# Patient Record
Sex: FEMALE | Race: WHITE | NOT HISPANIC OR LATINO | Employment: FULL TIME | URBAN - METROPOLITAN AREA
[De-identification: names, ages, dates, MRNs, and addresses within clinical notes are randomized per-mention and may not be internally consistent; named-entity substitution may affect disease eponyms.]

---

## 2020-02-24 ENCOUNTER — TRANSFERRED RECORDS (OUTPATIENT)
Dept: HEALTH INFORMATION MANAGEMENT | Facility: CLINIC | Age: 55
End: 2020-02-24

## 2020-11-12 ENCOUNTER — TRANSFERRED RECORDS (OUTPATIENT)
Dept: HEALTH INFORMATION MANAGEMENT | Facility: CLINIC | Age: 55
End: 2020-11-12

## 2020-12-02 ENCOUNTER — VIRTUAL VISIT (OUTPATIENT)
Dept: ENDOCRINOLOGY | Facility: CLINIC | Age: 55
End: 2020-12-02
Payer: COMMERCIAL

## 2020-12-02 VITALS — HEIGHT: 68 IN | BODY MASS INDEX: 44.41 KG/M2 | WEIGHT: 293 LBS

## 2020-12-02 DIAGNOSIS — Z71.3 NUTRITIONAL COUNSELING: ICD-10-CM

## 2020-12-02 DIAGNOSIS — E66.813 CLASS 3 SEVERE OBESITY DUE TO EXCESS CALORIES WITH SERIOUS COMORBIDITY AND BODY MASS INDEX (BMI) OF 50.0 TO 59.9 IN ADULT (H): Primary | ICD-10-CM

## 2020-12-02 DIAGNOSIS — E66.01 CLASS 3 SEVERE OBESITY DUE TO EXCESS CALORIES WITH SERIOUS COMORBIDITY AND BODY MASS INDEX (BMI) OF 50.0 TO 59.9 IN ADULT (H): Primary | ICD-10-CM

## 2020-12-02 DIAGNOSIS — K59.1 DIARRHEA, FUNCTIONAL: ICD-10-CM

## 2020-12-02 DIAGNOSIS — K75.81 NASH (NONALCOHOLIC STEATOHEPATITIS): ICD-10-CM

## 2020-12-02 DIAGNOSIS — E55.9 VITAMIN D DEFICIENCY: ICD-10-CM

## 2020-12-02 DIAGNOSIS — E11.8 DIABETES MELLITUS TYPE 2 WITH COMPLICATIONS (H): ICD-10-CM

## 2020-12-02 PROBLEM — R79.89 ELEVATED LIVER FUNCTION TESTS: Status: ACTIVE | Noted: 2018-05-06

## 2020-12-02 PROBLEM — R74.8 ELEVATED LIVER ENZYMES: Status: ACTIVE | Noted: 2018-05-01

## 2020-12-02 PROCEDURE — 97802 MEDICAL NUTRITION INDIV IN: CPT | Mod: GT | Performed by: DIETITIAN, REGISTERED

## 2020-12-02 PROCEDURE — 99203 OFFICE O/P NEW LOW 30 MIN: CPT | Mod: 95 | Performed by: NURSE PRACTITIONER

## 2020-12-02 RX ORDER — FLASH GLUCOSE SENSOR
KIT MISCELLANEOUS
COMMUNITY
Start: 2020-05-01 | End: 2021-03-17

## 2020-12-02 RX ORDER — ERGOCALCIFEROL 1.25 MG/1
50000 CAPSULE ORAL
COMMUNITY
Start: 2020-03-01 | End: 2021-07-14

## 2020-12-02 RX ORDER — ATORVASTATIN CALCIUM 10 MG/1
10 TABLET, FILM COATED ORAL AT BEDTIME
COMMUNITY
Start: 2020-06-23 | End: 2021-05-18

## 2020-12-02 RX ORDER — PHENOL 1.4 %
600 AEROSOL, SPRAY (ML) MUCOUS MEMBRANE 2 TIMES DAILY WITH MEALS
COMMUNITY
Start: 2020-05-01 | End: 2021-03-17

## 2020-12-02 RX ORDER — HYDROCHLOROTHIAZIDE 25 MG/1
TABLET ORAL PRN
COMMUNITY
Start: 2018-08-01 | End: 2021-05-18

## 2020-12-02 RX ORDER — INFLUENZA A VIRUS A/GUANGDONG-MAONAN/SWL1536/2019 CNIC-1909 (H1N1) ANTIGEN (FORMALDEHYDE INACTIVATED), INFLUENZA A VIRUS A/HONG KONG/2671/2019 (H3N2) ANTIGEN (FORMALDEHYDE INACTIVATED), INFLUENZA B VIRUS B/PHUKET/3073/2013 ANTIGEN (FORMALDEHYDE INACTIVATED), AND INFLUENZA B VIRUS B/WASHINGTON/02/2019 ANTIGEN (FORMALDEHYDE INACTIVATED) 15; 15; 15; 15 UG/.5ML; UG/.5ML; UG/.5ML; UG/.5ML
INJECTION, SUSPENSION INTRAMUSCULAR
COMMUNITY
Start: 2020-09-22 | End: 2021-03-16

## 2020-12-02 RX ORDER — FLASH GLUCOSE SCANNING READER
EACH MISCELLANEOUS
COMMUNITY
Start: 2020-06-12 | End: 2021-10-13

## 2020-12-02 RX ORDER — FLASH GLUCOSE SENSOR
1 KIT MISCELLANEOUS
COMMUNITY
Start: 2020-06-10 | End: 2021-10-13

## 2020-12-02 RX ORDER — FLASH GLUCOSE SENSOR
KIT MISCELLANEOUS
COMMUNITY
Start: 2020-10-23 | End: 2021-03-17

## 2020-12-02 RX ORDER — TOPIRAMATE 25 MG/1
50 TABLET, FILM COATED ORAL EVERY EVENING
Status: ON HOLD | COMMUNITY
Start: 2020-02-25 | End: 2021-04-06

## 2020-12-02 ASSESSMENT — MIFFLIN-ST. JEOR: SCORE: 2124.29

## 2020-12-02 ASSESSMENT — PAIN SCALES - GENERAL: PAINLEVEL: EXTREME PAIN (8)

## 2020-12-02 NOTE — PROGRESS NOTES
"New Bariatric Surgery Consultation Note    2020    RE: Stephanie Sanches  MR#: [id number]  : 1965      Referring provider: No flowsheet data found.    Chief Complaint/Reason for visit: evaluation for possible weight loss surgery    Dear Benson Allen MD (General),    I had the pleasure of seeing your patient, Stephanie Sanches, to evaluate her obesity and consider her for possible weight loss surgery. As you know, Stephanie Sanches is 55 year old.  She has a height of 5' 7.5\", a weight of 326 lbs 0 oz, and calculated Body mass index is 50.31 kg/m .      HISTORY OF PRESENT ILLNESS:  Weight Loss History Reviewed with Patient 2020   How long have you been overweight? Since early childhood   What is the most that you have ever weighed? 360   What is the most weight you have lost? 50   I have tried the following methods to lose weight Watching portions or calories, Exercise, Slimfast, Prescription Medications, Physician directed program   I have tried the following weight loss medications? (Check all that apply) Topamax/Topiramate, Fen-Phen   Have you ever had weight loss surgery? No     PCP and hepatologist has recommended surgery in the past but felt like she wanted to try everything else first.     Well [place] 2020 336, started on topiramate in this program- beneficial,     CO-MORBIDITIES OF OBESITY INCLUDE:     2020   I have the following health issues associated with obesity: Type II Diabetes, Fatty Liver     NAM- followed by GI    DMII- endocrinology , A1C stable at 7.1 was taking metformin and victoza which was helpful but then PCP recently stopped the victoza     Reflux- none currently, very rarely     PAST MEDICAL HISTORY:    No hx blood clots   Patient Active Problem List   Diagnosis     Morbid obesity (H)     Diabetes mellitus type 2 in obese (H)     Elevated liver enzymes     Diabetes mellitus type 2 with complications (H)     Diarrhea, functional     Elevated liver " function tests     Family history of ovarian cancer     Hyperlipidemia     Irritable bowel syndrome     NAM (nonalcoholic steatohepatitis)     Osteoarthrosis involving lower leg     Vitamin D deficiency       PAST SURGICAL HISTORY:  Past Surgical History:   Procedure Laterality Date     CHOLECYSTECTOMY  2008       FAMILY HISTORY:   No family history on file.      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 No   Who do you have in your support network that can be available to help you for the first 2 weeks after surgery? Friends, boyfriend and family members   Who can you count on for support throughout your weight loss surgery journey? Friends, boyfriend and family members   Can you afford 3 meals a day?  Yes   Can you afford 50-60 dollars a month for vitamins? Yes     Works from home     HABITS:     11/30/2020   How often do you drink alcohol? Never   Have you ever used any of the following nicotine products? No   Have you or are you currently using street drugs or prescription strength medication for which you do not have a prescription for? No   Do you have a history of chemical dependency (alcohol or drug abuse)? No       PSYCHOLOGICAL HISTORY:   Psychological History Reviewed With Patient 11/30/2020   Have you ever attempted suicide? Never.   Have you had thoughts of suicide in the past year? No   Have you ever been hospitalized for mental illness or a suicide attempt? Never.   Do you have a history of chronic pain? No   Have you ever been diagnosed with fibromyalgia? No   Are you currently seeing a therapist or counselor?  No   Are you currently seeing a psychiatrist? No     Feels stable     ROS:     11/30/2020   Skin:  Skin fold rashes (groin  or other folds)   HEENT: None of these   Musculoskeletal: Joint Pain, Back pain, Swelling of legs, Arthritis   Cardiovascular: None of the above   Pulmonary: None of the above   Gastrointestinal: Diarrhea   Genitourinary: Stress incontinence (losing urine when coughing, sneezing, etc.)   Hematological: None of the above   Neurological: None of the above   Female only: None of the above       EATING BEHAVIORS:     11/30/2020   Have you or anyone else thought that you had an eating disorder? No   Do you currently binge eat (eat a large amount of food in a short time)? No   Are you an emotional eater? Yes   Do you get up to eat after falling asleep? No       EXERCISE:     11/30/2020   How often do you exercise? 3 to 4 times per week   What is the duration of your exercise (in minutes)? 15 Minutes   What types of exercise do you do? walking, other   What keeps you from being more active?  I am as active as I can possbily be, Pain       MEDICATIONS:  Current Outpatient Medications   Medication Sig Dispense Refill     ASPIRIN 81 PO 81 mg       atorvastatin (LIPITOR) 10 MG tablet TK 1 T PO HS       calcium carbonate (CALCIUM CARBONATE) 600 MG tablet        Continuous Blood Gluc  (FREESTYLE CARLOS 14 DAY READER) CINDI USE AS DIRECTED       Continuous Blood Gluc Sensor (FREESTYLE CARLOS 14 DAY SENSOR) MISC 1 Device       Continuous Blood Gluc Sensor (FREESTYLE CARLOS 14 DAY SENSOR) MISC APPLY 1 DEVICE UTD EVERY FOURTEEN DAYS       Continuous Blood Gluc Sensor (FREESTYLE CARLOS 14 DAY SENSOR) MISC        ergocalciferol (ERGOCALCIFEROL) 1.25 MG (72674 UT) capsule 50,000 Units twice a week       FLUZONE QUADRIVALENT 0.5 ML injection ADM 0.5ML IM UTD       hydrochlorothiazide (HYDRODIURIL) 25 MG tablet as needed In the summer time only PRN       metFORMIN (GLUCOPHAGE) 1000 MG tablet TAKE 1 TABLET BY MOUTH TWICE DAILY. TAKE WITH FOOD.       Probiotic Product (PROBIOTIC-10 PO)        topiramate (TOPAMAX) 25 MG tablet Take 50 mg  "by mouth         ALLERGIES:  Allergies   Allergen Reactions     Cefaclor Hives     Cephalexin Hives     Dust Mite Extract Other (See Comments)     Triamcinolone Other (See Comments) and Unknown     Injection redness over body       Cortisone Rash     Reaction to cortisone shot in knee         LABS/IMAGING/MEDICAL RECORDS REVIEW:                      Findings:              The hypopharynx was normal.                         The examined esophagus was normal.                         The entire examined stomach was normal.                         The examined duodenum was normal.                         The cardia and gastric fundus were normal on                          retroflexion.  Complications:         No immediate complications.  Estimated Blood Loss:       Estimated blood loss: none.  Impression:            - Normal hypopharynx.                         - Normal esophagus.                         - Normal stomach.                         - Normal examined duodenum.                         - No specimens collected.                         - No evidence of esophagitis. No cause to abdominal                          pain noted.  Recommendation:        - Continue present medications.                         - Repeat upper endoscopy PRN.                         - Return to primary care physician.                         - Follow an antireflux regimen.  PHYSICAL EXAM:  Ht 1.715 m (5' 7.5\")   Wt 147.9 kg (326 lb)   BMI 50.31 kg/m        In summary, Stephanie Sanches has Class III obesity with a body mass index of Body mass index is 50.31 kg/m . kg/m2 and the comorbidities stated above. She completed an informational seminar and is a candidate for the laparoscopic gastric sleeve.  She will have to complete the following pre-requisites:    Received weight loss goal of 20 lb prior to surgery. 316  Dietitian x 3   Have preoperative laboratory tests drawn.  Psychological Evaluation with MMPI and clearance for weight loss " surgery.  Letter of clearance from the following primary care provider, hepatologist, and endocrinologist   Is patient currently taking narcotic/opioid medications? NO    Today in the office we discussed gastric sleeve surgery. Preoperative, perioperative, and postoperative processes, management, and follow up were addressed.  Risks and benefits were outlined including the risk of death, staple line leak (1-2%), PE, DVT, ulcer, worsening GERD, N/V, stricture, hernia, wound infection, weight regain, and vitamin deficiencies. I emphasized exercise and activity along with appropriate food choice as the main foundation for weight loss with surgery providing surgical reinforcement of this.  All questions were answered.  A goal sheet and support group handout were given to the patient.    Once the patient has completed the requirements in their task list and there are no further recommendations, the pt will be allowed to see the surgeon of her choice for consultation on the laparoscopic gastric sleeve surgery. Patient verbalizes understanding of the process to surgery and expectations for the postoperative period including the need for lifelong lifestyle changes, vitamin supplementation, and laboratory monitoring.    Sincerely,     Farida Seymour NP    I spent a total of 30 minutes face to face with the patient during today's office visit. Over 50% of this time was spent counseling the patient and/or coordinating care.    [facility]

## 2020-12-02 NOTE — PATIENT INSTRUCTIONS
"Thank you for allowing us the privilege of caring for you. We hope we provided you with the excellent service you deserve.   Please let us know if there is anything else we can do for you so that we can be sure you are completely satisfied with your care experience.    To ensure the quality of our services you may be receiving a patient satisfaction survey from an independent patient satisfaction monitoring company.    The greatest compliment you can give is a \"Likely to Recommend\"    Your visit was with Farida Seymour NP today.    Instructions per today's visit:     Rickey Sanches, it was great to visit with you today.  Here is a review of our visit.  If our clinic scheduler is not able to reach you please call [phone] to schedule your next appointments.    Read Bariatric Surgery packet- in [place] message  Dietitian visits x3   See Dr. Rosales in 1 month - in person or virtual   Schedule Psych Consult - see [employer]   Schedule Clearances hepatology, endocrinology, primary care provider   Follow up with me as needed if needing assistance with weight loss before surgery   Labs ordered - will fax to your local clinic   Continue metformin and topiramate     Call Keshawn Bailey at [phone] to discuss insurance coverage for bariatric surgery.  Please check with your insurance regarding bariatric surgery coverage also.      Please call our contact center at [phone] to schedule your next appointments.  To find a lab location near you, please call [phone].  For any nursing questions or concerns call Ynes Zamarripa LPN at [phone] or Ruth Arora RN at [phone]  Please call during clinic hours Monday through Friday 8:00a - 4:00p if you have questions or you can contact us via [employer] at anytime and we will reply during clinic hours.    Lab results will be communicated through My Chart or letter (if My Chart not used). Please call the clinic if you have not received communication after 1 " week or if you have any questions.?  Clinic Fax: [phone]  ______________________________________________________________________________________________________________________  Meal Replacement Products:    Here is the link to our new e-store where you can purchase our meal replacement products    [employer] E-Store  aiHit/store    The one week starter kit is a great way to sample a variety of products and see what works for you.    If you want more information about the product go to: [employer]    If you are an employee or [facility] [employer] or  [employer] please contact your care team for a 10% estore discount    Free Shipping for orders over $75     Benefits of meal replacements products:    Portion and calorie control  Improved nutrition  Structured eating  Simplified food choices  Avoid contact with trigger foods  _________________________________________________________________________________________________________________________________  Interested in working with a health ?  Health coaches work with you to improve your overall health and wellbeing.  They look at the whole person, and may involve discussion of different areas of life, including, but not limited to the four pillars of health (sleep, exercise, nutrition, and stress management). Discuss with your care team if you would like to start working a health .  Health Coaching-3 Pack: Schedule by calling [phone]    $99 for three health coaching visits    Visits may be done in person or via phone    Coaching is a partnership between the  and the client; Coaches do not prescribe or diagnose    Coaching helps inspire the client to reach his/her personal goals   _________________________________________________________________________________________________________________________________  Healthy Lifestyle Support Group    [facility] [place] Weight Management  Program  Virtual Support Group Now Available    60 minutes of small group guided discussion, support and resources    Led by Health , Martha Coates    For questions, and to receive the invite information, contact Martha at [email or web address]    All our welcome!    One Friday per month, 12:30pm to 1:30pm  SELF COMPASSION: July 31st  CREATING MY WELLNESS VISION: August 28th  MINDFULNESS PRACTICES FOR A CALM BODY & MIND: September 25th  MINDFUL EATING TOOLS: October 30th  HEALTHY& HAPPY HOLIDAYS: November 20th  OPEN FORUM: December 18th  _______________________________________________________________________________________________________________________________  Connections: Bariatric Care support group  Due to the Covid-19 restrictions, we will be doing our support group virtually using Microsoft Teams. You will need to get an invitation to the group from Michael Carias, Ph.D., LP at [email or web address] and to learn about using Microsoft Teams. The next meeting is on Tuesday, July 14 between 6:30 and 8 PM and there will be no formal speaker.    This group meets the second Tuesday of each month from 6:30 to 8 PM and will be held again at [facility] in the [address] [place] [address] (A-B room) when the Covid-19 restrictions are lifted. The group is led by Michael Carias, Ph.D., Licensed Psychologist, [facility] Comprehensive Weight Management Program. There is no cost for group participation and is open to all [facility] (and those external to this program) pre- and post-operative bariatric surgery patients as well as their support system.   _________________________________________________________________________________________________________________________________  [place] [facility] Get Moving Program  Our team of physical therapists is trained to help you understand and take control of your condition. They will perform a thorough evaluation to determine your  ability for activity and develop a customized plan to fit your goals and physical ability.  Scheduling: Unsure if the Get Moving program is right for you? Discuss the program with your medical provider or diabetes educator. You can also call us at [phone] to ask questions or schedule an appointment.   [facility] Get Moving Program  ______________________________________________________________________________________________________________________  [facility] Diabetes Prevention Program (DPP)  If you have prediabetes and Medicare please contact us by MyChart or phone to learn more about our Diabetes Prevention Program  _______________________________________________________________________________________________________________________  Bluetooth Scale:    We hope to provide you with high quality telephone and virtual healthcare visits while social distancing for COVID-19 is necessary, as well as in the future when virtual visits may be more convenient for you.     Our technology team made it possible for Bluetooth scales to send weight measurements to our electronic medical record. This allows weights from you weighing at home to securely flow into the medical record, which will improve telephone and virtual visits.   Additionally, studies have shown that adults actually lose more weight when their weights are automatically sent to someone else, and also that this process is not stressful for those adults.    Below is a link for purchasing the scale, with a discount code for our patients. You may call your insurance company to see if they will reimburse you for the cost of the scale, as a piece of durable medical equipment. The scales only go up to a weight of 400 pounds. This is an issue and we are working with the developer on increasing this. We found no scales that go over 400lb that have blue-tooth for connecting to [employer].    Scale to purchase: the [facility]  Body  Scale:  https://www.[employer]/us/en/body/shop?[email or web address]&gclsrc=aw.ds    Discount Code: We have a discount code for our patients to bring the cost down to $50, Discount code is: [place]_Scale_20%off  Thank you,   [facility] Comprehensive Weight Management Team

## 2020-12-02 NOTE — Clinical Note
NBS. Well [place], lives in [facility]. Ruth, can you please send packet and clearances? Jessie, can you fax her labs to [facility]? I still need to get my screen working that allows me access to the tasklists. Sorrry!

## 2020-12-02 NOTE — LETTER
"2020       RE: Stephanie Sanches  [address] [address]     Dear Colleague,    Thank you for referring your patient, Stephanie Sanches, to the [facility] WEIGHT [facility]NAGEMENT CLINIC [place] [facility]. Please see a copy of my visit note below.    New Bariatric Surgery Consultation Note    2020    RE: Stephanie Sanches  MR#: [id number]  : 1965      Referring provider: No flowsheet data found.    Chief Complaint/Reason for visit: evaluation for possible weight loss surgery    Dear Benson Allen MD (General),    I had the pleasure of seeing your patient, Stephanie Sanches, to evaluate her obesity and consider her for possible weight loss surgery. As you know, Stephanie Sanches is 55 year old.  She has a height of 5' 7.5\", a weight of 326 lbs 0 oz, and calculated Body mass index is 50.31 kg/m .      HISTORY OF PRESENT ILLNESS:  Weight Loss History Reviewed with Patient 2020   How long have you been overweight? Since early childhood   What is the most that you have ever weighed? 360   What is the most weight you have lost? 50   I have tried the following methods to lose weight Watching portions or calories, Exercise, Slimfast, Prescription Medications, Physician directed program   I have tried the following weight loss medications? (Check all that apply) Topamax/Topiramate, Fen-Phen   Have you ever had weight loss surgery? No     PCP and hepatologist has recommended surgery in the past but felt like she wanted to try everything else first.     Well [place] 2020 336, started on topiramate in this program- beneficial,     CO-MORBIDITIES OF OBESITY INCLUDE:     2020   I have the following health issues associated with obesity: Type II Diabetes, Fatty Liver     NAM- followed by GI    DMII- endocrinology , A1C stable at 7.1 was taking metformin and victoza which was helpful but then PCP recently stopped the " victoza     Reflux- none currently, very rarely     PAST MEDICAL HISTORY:    No hx blood clots   Patient Active Problem List   Diagnosis     Morbid obesity (H)     Diabetes mellitus type 2 in obese (H)     Elevated liver enzymes     Diabetes mellitus type 2 with complications (H)     Diarrhea, functional     Elevated liver function tests     Family history of ovarian cancer     Hyperlipidemia     Irritable bowel syndrome     NAM (nonalcoholic steatohepatitis)     Osteoarthrosis involving lower leg     Vitamin D deficiency       PAST SURGICAL HISTORY:  Past Surgical History:   Procedure Laterality Date     CHOLECYSTECTOMY  2008       FAMILY HISTORY:   No family history on file.      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 No   Who do you have in your support network that can be available to help you for the first 2 weeks after surgery? Friends, boyfriend and family members   Who can you count on for support throughout your weight loss surgery journey? Friends, boyfriend and family members   Can you afford 3 meals a day?  Yes   Can you afford 50-60 dollars a month for vitamins? Yes     Works from home     HABITS:     11/30/2020   How often do you drink alcohol? Never   Have you ever used any of the following nicotine products? No   Have you or are you currently using street drugs or prescription strength medication for which you do not have a prescription for? No   Do you have a history of chemical dependency (alcohol or drug abuse)? No       PSYCHOLOGICAL HISTORY:   Psychological History Reviewed With Patient 11/30/2020   Have you ever attempted suicide? Never.   Have you had thoughts of suicide in the past year? No   Have you ever been  hospitalized for mental illness or a suicide attempt? Never.   Do you have a history of chronic pain? No   Have you ever been diagnosed with fibromyalgia? No   Are you currently seeing a therapist or counselor?  No   Are you currently seeing a psychiatrist? No     Feels stable     ROS:     11/30/2020   Skin:  Skin fold rashes (groin or other folds)   HEENT: None of these   Musculoskeletal: Joint Pain, Back pain, Swelling of legs, Arthritis   Cardiovascular: None of the above   Pulmonary: None of the above   Gastrointestinal: Diarrhea   Genitourinary: Stress incontinence (losing urine when coughing, sneezing, etc.)   Hematological: None of the above   Neurological: None of the above   Female only: None of the above       EATING BEHAVIORS:     11/30/2020   Have you or anyone else thought that you had an eating disorder? No   Do you currently binge eat (eat a large amount of food in a short time)? No   Are you an emotional eater? Yes   Do you get up to eat after falling asleep? No       EXERCISE:     11/30/2020   How often do you exercise? 3 to 4 times per week   What is the duration of your exercise (in minutes)? 15 Minutes   What types of exercise do you do? walking, other   What keeps you from being more active?  I am as active as I can possbily be, Pain       MEDICATIONS:  Current Outpatient Medications   Medication Sig Dispense Refill     ASPIRIN 81 PO 81 mg       atorvastatin (LIPITOR) 10 MG tablet TK 1 T PO HS       calcium carbonate (CALCIUM CARBONATE) 600 MG tablet        Continuous Blood Gluc  (FREESTYLE CARLOS 14 DAY READER) CINDI USE AS DIRECTED       Continuous Blood Gluc Sensor (FREESTYLE CARLOS 14 DAY SENSOR) MISC 1 Device       Continuous Blood Gluc Sensor (FREESTYLE CARLOS 14 DAY SENSOR) MISC APPLY 1 DEVICE UTD EVERY FOURTEEN DAYS       Continuous Blood Gluc Sensor (FREESTYLE CARLOS 14 DAY SENSOR) [facility] [place]        ergocalciferol (ERGOCALCIFEROL) 1.25 MG (00508 UT) capsule 50,000 Units twice a week        "FLUZONE QUADRIVALENT 0.5 ML injection ADM 0.5ML IM UTD       hydrochlorothiazide (HYDRODIURIL) 25 MG tablet as needed In the summer time only PRN       metFORMIN (GLUCOPHAGE) 1000 MG tablet TAKE 1 TABLET BY MOUTH TWICE DAILY. TAKE WITH FOOD.       Probiotic Product (PROBIOTIC-10 PO)        topiramate (TOPAMAX) 25 MG tablet Take 50 mg by mouth         ALLERGIES:  Allergies   Allergen Reactions     Cefaclor Hives     Cephalexin Hives     Dust Mite Extract Other (See Comments)     Triamcinolone Other (See Comments) and Unknown     Injection redness over body       Cortisone Rash     Reaction to cortisone shot in knee         LABS/IMAGING/MEDICAL RECORDS REVIEW:                      Findings:              The hypopharynx was normal.                         The examined esophagus was normal.                         The entire examined stomach was normal.                         The examined duodenum was normal.                         The cardia and gastric fundus were normal on                          retroflexion.  Complications:         No immediate complications.  Estimated Blood Loss:       Estimated blood loss: none.  Impression:            - Normal hypopharynx.                         - Normal esophagus.                         - Normal stomach.                         - Normal examined duodenum.                         - No specimens collected.                         - No evidence of esophagitis. No cause to abdominal                          pain noted.  Recommendation:        - Continue present medications.                         - Repeat upper endoscopy PRN.                         - Return to primary care physician.                         - Follow an antireflux regimen.  PHYSICAL EXAM:  Ht 1.715 m (5' 7.5\")   Wt 147.9 kg (326 lb)   BMI 50.31 kg/m        In summary, Stephanie Sanches has Class III obesity with a body mass index of Body mass index is 50.31 kg/m . kg/m2 and the comorbidities stated above. " "She completed an informational seminar and is a candidate for the laparoscopic gastric sleeve.  She will have to complete the following pre-requisites:    Received weight loss goal of 20 lb prior to surgery. 316  Dietitian x 3   Have preoperative laboratory tests drawn.  Psychological Evaluation with MMPI and clearance for weight loss surgery.  Letter of clearance from the following primary care provider, hepatologist, and endocrinologist   Is patient currently taking narcotic/opioid medications? NO    Today in the office we discussed gastric sleeve surgery. Preoperative, perioperative, and postoperative processes, management, and follow up were addressed.  Risks and benefits were outlined including the risk of death, staple line leak (1-2%), PE, DVT, ulcer, worsening GERD, N/V, stricture, hernia, wound infection, weight regain, and vitamin deficiencies. I emphasized exercise and activity along with appropriate food choice as the main foundation for weight loss with surgery providing surgical reinforcement of this.  All questions were answered.  A goal sheet and support group handout were given to the patient.    Once the patient has completed the requirements in their task list and there are no further recommendations, the pt will be allowed to see the surgeon of her choice for consultation on the laparoscopic gastric sleeve surgery. Patient verbalizes understanding of the process to surgery and expectations for the postoperative period including the need for lifelong lifestyle changes, vitamin supplementation, and laboratory monitoring.    Sincerely,     Farida Seymour NP    I spent a total of 30 minutes face to face with the patient during today's office visit. Over 50% of this time was spent counseling the patient and/or coordinating care.    [facility]     Stephanie Sanches is a 55 year old female who is being evaluated via a billable video visit.      The patient has been notified of following:     \"This " "video visit will be conducted via a call between you and your physician/provider. We have found that certain health care needs can be provided without the need for an in-person physical exam.  This service lets us provide the care you need with a video conversation.  If a prescription is necessary we can send it directly to your pharmacy.  If lab work is needed we can place an order for that and you can then stop by our lab to have the test done at a later time.    Video visits are billed at different rates depending on your insurance coverage.  Please reach out to your insurance provider with any questions.    If during the course of the call the physician/provider feels a video visit is not appropriate, you will not be charged for this service.\"    Patient has given verbal consent for Video visit? Yes  How would you like to obtain your AVS? MyChart  If you are dropped from the video visit, the video invite should be resent to: Text to cell phone: [phone]  Will anyone else be joining your video visit? No        Video-Visit Details    Type of service:  Video Visit    Video Start Time: 1443  Video End Time: 1515    Originating Location (pt. Location): Home    Distant Location (provider location):  [facility] WEIGHT MANAGEMENT CLINIC [place]     Platform used for Video Visit: Danelle Seymour NP      "

## 2020-12-02 NOTE — PROGRESS NOTES
"Stephanie Sanches is a 55 year old female who is being evaluated via a billable video visit.      The patient has been notified of following:     \"This video visit will be conducted via a call between you and your physician/provider. We have found that certain health care needs can be provided without the need for an in-person physical exam.  This service lets us provide the care you need with a video conversation.  If a prescription is necessary we can send it directly to your pharmacy.  If lab work is needed we can place an order for that and you can then stop by our lab to have the test done at a later time.    Video visits are billed at different rates depending on your insurance coverage.  Please reach out to your insurance provider with any questions.    If during the course of the call the physician/provider feels a video visit is not appropriate, you will not be charged for this service.\"    Patient has given verbal consent for Video visit? Yes  How would you like to obtain your AVS? MyChart  If you are dropped from the video visit, the video invite should be resent to: Text to cell phone:  [phone]  Will anyone else be joining your video visit? No        Video-Visit Details    Type of service:  Video Visit    Video Start Time: 3:42 PM  Video End Time: 4:14 PM    Originating Location (pt. Location): Home    Distant Location (provider location):  [facility] WEIGHT MANAGEMENT CLINIC [place]     Platform used for Video Visit: [employer]    During this virtual visit the patient is located in [place], patient verifies this as the location during the entirety of this visit.     New Bariatric Nutrition Consultation Note    Reason For Visit: Nutrition Assessment    Stephanie Sanches is a 55 year old presenting today for new bariatric nutrition consult.   Pt is interested in laparoscopic sleeve gastrectomy with Dr. Rosales expected surgery in March 2021.  Patient is accompanied by self.  This is pt's first of 3 " "required nutrition visits prior to surgery.     Pt referred by Farida Seymour NP on December 2, 2020.  Patient with Co-morbidities of obesity including:  Type II DM yes  Renal Failure no  Sleep apnea no  Hypertension no   Dyslipidemia no  Joint pain no  Back pain no  GERD no     Pt's history is also significant for NAM, followed by GI     Support System Reviewed With Patient 11/30/2020   Who do you have in your support network that can be available to help you for the first 2 weeks after surgery? Friends, boyfriend and family members   Who can you count on for support throughout your weight loss surgery journey? Friends, boyfriend and family members       ANTHROPOMETRICS:  Estimated body mass index is 50.31 kg/m  as calculated from the following:    Height as of an earlier encounter on 12/2/20: 1.715 m (5' 7.5\").    Weight as of an earlier encounter on 12/2/20: 147.9 kg (326 lb).    Required weight loss goal pre-op: 20 lbs from initial consult weight (goal weight 306 lbs or less before surgery)       11/30/2020   I have tried the following methods to lose weight Watching portions or calories, Exercise, Slimfast, Prescription Medications, Physician directed program       Weight Loss Questions Reviewed With Patient 11/30/2020   How long have you been overweight? Since early childhood       SUPPLEMENT INFORMATION:  Calcium carbonate 600 mg BID  Vitamin 50,000 international unit(s) biweekly   probiotic    NUTRITION HISTORY:  Diet Recall:  B: Premier protein   Snack: Fruit breakfast or granola bar  L: leftovers, pizza roll, sandiwch, homemade soup, chicken   D: pizza roll, sandiwch, homemade soup, chicken   Beverages: 32-64 oz practicing sipping   Skim milk insteadof having pop   Try really hard not eat past 7 pm       Recall Diet Questions Reviewed With Patient 11/30/2020   Describe what you typically consume for breakfast (typical or most recent): Whole grain cereal with blueberries or steel cut oats 1 cup coffee with " organic half & half and sweetner. Now per doctor I have protien shake and coffee   Describe what you typically consume for lunch (typical or most recent): Leftovers, salad, fruit, soup, chicken breast veggies   Describe what you typically consume for supper (typical or most recent): Leftovers, salad, chicken, steak once ever two weeks, pizza on Fridays, steamed veggies,   Describe what you typically consume as snacks (typical or most recent): Fruit cheese almonds yogurt   How many ounces of water, or other low calorie drinks, do you drink daily (8 oz=1 glass)? 64 oz or more   How many ounces of caffeine (coffee, tea, pop) do you drink daily (8 oz=1 glass)? 16 oz- 1 cup of coffee with splenda, organic hald and half    How many ounces of milk do you drink daily (8 oz=1 glass) 16 oz   Please indicate the type of milk: skim   How often do you drink alcohol? Never       Eating Habits 11/30/2020   Do you have any dietary restrictions? No   Do you currently binge eat (eat a large amount of food in a short time)? No   Are you an emotional eater? Yes   Do you get up to eat after falling asleep? No   What foods do you crave? Soda and sweets       ADDITIONAL INFORMATION:    Dining Out History Reviewed With Patient 11/30/2020   How often do you dine out? Around once a week.   Where do you dine out? (select all that apply) fast food chains, take out   What types of food do you order when you dine out? Ino Nunes, Ashley [place]       Physical Activity Reviewed With Patient 11/30/2020   How often do you exercise? 3 to 4 times per week   What is the duration of your exercise (in minutes)? 15 Minutes   What types of exercise do you do? walking, other   What keeps you from being more active?  I am as active as I can possbily be, Pain         NUTRITION DIAGNOSIS:  Obesity r/t long history of self-monitoring deficit and excessive energy intake aeb BMI >30 kg/m2.    INTERVENTION:  Intervention Provided/Education Provided on post-op  "diet guidelines, vitamins/minerals essential post-operatively, GI anatomy of bariatric surgeries, ways to help prepare for post-op diet guidelines pre-operatively, portion/calorie-control, mindful eating and sources of protein.  Patient demonstrates understanding. Provided pt with list of goals RD contact information.    - AVS via [employer]     Questions Reviewed With Patient 11/30/2020   How ready are you to make changes regarding your weight? Number 1 = Not ready at all to make changes up to 10 = very ready. 10   How confident are you that you can change? 1 = Not confident that you will be successful making changes up to 10 = very confident. 10       Expected Engagement: good    GOALS:  Relating To Eating:  Eat slowly (20-30 minutes per meal), chewing foods well (25 chews per bite/applesauce consistency)  9\" Plate method (1/2 plate non-starchy vegetables/fruit, 1/4 plate lean protein, 1/4 plate whole grain starch - no more than 1/2 cup carb/meal)    Relating to beverages:  Reduce caffeine/carbonation/calorie containing beverages  Separate fluids from meals by 30 minutes before, during, and after eating   4-8 oz milk or less per day     Initial Consult / Weight Loss     My Plate Planner_English - Pt Education  [email or web address]    Protein Sources for Weight Loss  [email or web address]     Carbohydrates  [email or web address]       Time spent with patient: 32 minutes.  Lety Beal, MS, RD, LD        "

## 2020-12-02 NOTE — NURSING NOTE
"Chief Complaint   Patient presents with     New Patient     NBS consult       Vitals:    12/02/20 1411   Weight: 147.9 kg (326 lb)   Height: 1.715 m (5' 7.5\")       Body mass index is 50.31 kg/m .                              "

## 2020-12-02 NOTE — LETTER
"12/2/2020       RE: Stephanie Sanches  [address] [address]     Dear Colleague,    Thank you for referring your patient, Stephanie Sanches, to the [facility] [place] at [facility]. Please see a copy of my visit note below.    Stephanie Sanches is a 55 year old female who is being evaluated via a billable video visit.      The patient has been notified of following:     \"This video visit will be conducted via a call between you and your physician/provider. We have found that certain health care needs can be provided without the need for an in-person physical exam.  This service lets us provide the care you need with a video conversation.  If a prescription is necessary we can send it directly to your pharmacy.  If lab work is needed we can place an order for that and you can then stop by our lab to have the test done at a later time.    Video visits are billed at different rates depending on your insurance coverage.  Please reach out to your insurance provider with any questions.    If during the course of the call the physician/provider feels a video visit is not appropriate, you will not be charged for this service.\"    Patient has given verbal consent for Video visit? Yes  How would you like to obtain your AVS? MyChart  If you are dropped from the video visit, the video invite should be resent to: Text to cell phone:  [phone]  Will anyone else be joining your video visit? No        Video-Visit Details    Type of service:  Video Visit    Video Start Time: 3:42 PM  Video End Time: 4:14 PM    Originating Location (pt. Location): Home    Distant Location (provider location):  [facility] [place]     Platform used for Video Visit: Absio    During this virtual visit the patient is located in [place], patient verifies this as the location during the entirety of this visit.     New Bariatric " "Nutrition Consultation Note    Reason For Visit: Nutrition Assessment    Stephanie Sanches is a 55 year old presenting today for new bariatric nutrition consult.   Pt is interested in laparoscopic sleeve gastrectomy with Dr. Rosales expected surgery in March 2021.  Patient is accompanied by self.  This is pt's first of 3 required nutrition visits prior to surgery.     Pt referred by Farida Seymour NP on December 2, 2020.  Patient with Co-morbidities of obesity including:  Type II DM yes  Renal Failure no  Sleep apnea no  Hypertension no   Dyslipidemia no  Joint pain no  Back pain no  GERD no     Pt's history is also significant for NAM, followed by GI     Support System Reviewed With Patient 11/30/2020   Who do you have in your support network that can be available to help you for the first 2 weeks after surgery? Friends, boyfriend and family members   Who can you count on for support throughout your weight loss surgery journey? Friends, boyfriend and family members       ANTHROPOMETRICS:  Estimated body mass index is 50.31 kg/m  as calculated from the following:    Height as of an earlier encounter on 12/2/20: 1.715 m (5' 7.5\").    Weight as of an earlier encounter on 12/2/20: 147.9 kg (326 lb).    Required weight loss goal pre-op: 20 lbs from initial consult weight (goal weight 306 lbs or less before surgery)       11/30/2020   I have tried the following methods to lose weight Watching portions or calories, Exercise, Slimfast, Prescription Medications, Physician directed program       Weight Loss Questions Reviewed With Patient 11/30/2020   How long have you been overweight? Since early childhood       SUPPLEMENT INFORMATION:  Calcium carbonate 600 mg BID  Vitamin 50,000 international unit(s) biweekly   probiotic    NUTRITION HISTORY:  Diet Recall:  B: Premier protein   Snack: Fruit breakfast or granola bar  L: leftovers, pizza roll, sandiwch, homemade soup, chicken   D: pizza roll, sandiwch, homemade soup, chicken "   Beverages: 32-64 oz practicing sipping   Skim milk insteadof having pop   Try really hard not eat past 7 pm       Recall Diet Questions Reviewed With Patient 11/30/2020   Describe what you typically consume for breakfast (typical or most recent): Whole grain cereal with blueberries or steel cut oats 1 cup coffee with organic half & half and sweetner. Now per doctor I have protien shake and coffee   Describe what you typically consume for lunch (typical or most recent): Leftovers, salad, fruit, soup, chicken breast veggies   Describe what you typically consume for supper (typical or most recent): Leftovers, salad, chicken, steak once ever two weeks, pizza on Fridays, steamed veggies,   Describe what you typically consume as snacks (typical or most recent): Fruit cheese almonds yogurt   How many ounces of water, or other low calorie drinks, do you drink daily (8 oz=1 glass)? 64 oz or more   How many ounces of caffeine (coffee, tea, pop) do you drink daily (8 oz=1 glass)? 16 oz- 1 cup of coffee with splenda, organic hald and half    How many ounces of milk do you drink daily (8 oz=1 glass) 16 oz   Please indicate the type of milk: skim   How often do you drink alcohol? Never       Eating Habits 11/30/2020   Do you have any dietary restrictions? No   Do you currently binge eat (eat a large amount of food in a short time)? No   Are you an emotional eater? Yes   Do you get up to eat after falling asleep? No   What foods do you crave? Soda and sweets       ADDITIONAL INFORMATION:    Dining Out History Reviewed With Patient 11/30/2020   How often do you dine out? Around once a week.   Where do you dine out? (select all that apply) fast food chains, take out   What types of food do you order when you dine out? Ino Nunes, Ashley De Leon       Physical Activity Reviewed With Patient 11/30/2020   How often do you exercise? 3 to 4 times per week   What is the duration of your exercise (in minutes)? 15 Minutes   What types  "of exercise do you do? walking, other   What keeps you from being more active?  I am as active as I can possbily be, Pain         NUTRITION DIAGNOSIS:  Obesity r/t long history of self-monitoring deficit and excessive energy intake aeb BMI >30 kg/m2.    INTERVENTION:  Intervention Provided/Education Provided on post-op diet guidelines, vitamins/minerals essential post-operatively, GI anatomy of bariatric surgeries, ways to help prepare for post-op diet guidelines pre-operatively, portion/calorie-control, mindful eating and sources of protein.  Patient demonstrates understanding. Provided pt with list of goals RD contact information.    - AVS via [email or web address]     Questions Reviewed With Patient 11/30/2020   How ready are you to make changes regarding your weight? Number 1 = Not ready at all to make changes up to 10 = very ready. 10   How confident are you that you can change? 1 = Not confident that you will be successful making changes up to 10 = very confident. 10       Expected Engagement: good    GOALS:  Relating To Eating:  Eat slowly (20-30 minutes per meal), chewing foods well (25 chews per bite/applesauce consistency)  9\" Plate method (1/2 plate non-starchy vegetables/fruit, 1/4 plate lean protein, 1/4 plate whole grain starch - no more than 1/2 cup carb/meal)    Relating to beverages:  Reduce caffeine/carbonation/calorie containing beverages  Separate fluids from meals by 30 minutes before, during, and after eating   4-8 oz milk or less per day     Initial Consult / Weight Loss     My Plate Planner_English - Pt Education  [email or web address]    Protein Sources for Weight Loss  [email or web address]     Carbohydrates  [email or web address]       Time spent with patient: 32 minutes.  Lety Beal, MS, RD, LD      "

## 2020-12-02 NOTE — PROGRESS NOTES
"Stephanie Sanches is a 55 year old female who is being evaluated via a billable video visit.      The patient has been notified of following:     \"This video visit will be conducted via a call between you and your physician/provider. We have found that certain health care needs can be provided without the need for an in-person physical exam.  This service lets us provide the care you need with a video conversation.  If a prescription is necessary we can send it directly to your pharmacy.  If lab work is needed we can place an order for that and you can then stop by our lab to have the test done at a later time.    Video visits are billed at different rates depending on your insurance coverage.  Please reach out to your insurance provider with any questions.    If during the course of the call the physician/provider feels a video visit is not appropriate, you will not be charged for this service.\"    Patient has given verbal consent for Video visit? Yes  How would you like to obtain your AVS? MyChart  If you are dropped from the video visit, the video invite should be resent to: Text to cell phone: [phone]  Will anyone else be joining your video visit? No        Video-Visit Details    Type of service:  Video Visit    Video Start Time: 1443  Video End Time: 1515    Originating Location (pt. Location): Home    Distant Location (provider location):  [facility] [place]     Platform used for Video Visit: Danelle Seymour NP      "

## 2020-12-03 ENCOUNTER — CARE COORDINATION (OUTPATIENT)
Dept: ENDOCRINOLOGY | Facility: CLINIC | Age: 55
End: 2020-12-03

## 2020-12-06 NOTE — PATIENT INSTRUCTIONS
"GOALS:  Relating To Eating:  Eat slowly (20-30 minutes per meal), chewing foods well (25 chews per bite/applesauce consistency)  9\" Plate method (1/2 plate non-starchy vegetables/fruit, 1/4 plate lean protein, 1/4 plate whole grain starch - no more than 1/2 cup carb/meal)    Relating to beverages:  Reduce caffeine/carbonation/calorie containing beverages  Separate fluids from meals by 30 minutes before, during, and after eating   4-8 oz milk or less per day     Initial Consult / Weight Loss     My Plate Planner_English - Pt Education  [email or web address]    Protein Sources for Weight Loss  [email or web address]     Carbohydrates  [email or web address]     Follow up with RD in one month    Lety Beal, MS, RD, LD  If you need to schedule or reschedule with a dietitian please call [phone].  "

## 2020-12-10 ENCOUNTER — TELEPHONE (OUTPATIENT)
Dept: ENDOCRINOLOGY | Facility: CLINIC | Age: 55
End: 2020-12-10

## 2020-12-10 NOTE — TELEPHONE ENCOUNTER
Clarified questions regarding task list items.  Sent list of Psychologists to patient.  Faxed lab orders to [facility] at [phone].  Appt scheduled for patient to see Dr. Zambrano in January for her Meet and Greet.  Scheduling number given to patient to schedule her remaining 2 dietician visits.

## 2020-12-15 ENCOUNTER — MEDICAL CORRESPONDENCE (OUTPATIENT)
Dept: HEALTH INFORMATION MANAGEMENT | Facility: CLINIC | Age: 55
End: 2020-12-15

## 2020-12-15 ENCOUNTER — TRANSFERRED RECORDS (OUTPATIENT)
Dept: HEALTH INFORMATION MANAGEMENT | Facility: CLINIC | Age: 55
End: 2020-12-15

## 2020-12-17 ENCOUNTER — MEDICAL CORRESPONDENCE (OUTPATIENT)
Dept: HEALTH INFORMATION MANAGEMENT | Facility: CLINIC | Age: 55
End: 2020-12-17

## 2021-01-05 NOTE — PROGRESS NOTES
"Stephanie Sanches is a 55 year old female who is being evaluated via a billable video visit.      The patient has been notified of following:     \"This video visit will be conducted via a call between you and your physician/provider. We have found that certain health care needs can be provided without the need for an in-person physical exam.  This service lets us provide the care you need with a video conversation.  If a prescription is necessary we can send it directly to your pharmacy.  If lab work is needed we can place an order for that and you can then stop by our lab to have the test done at a later time.    Video visits are billed at different rates depending on your insurance coverage.  Please reach out to your insurance provider with any questions.    If during the course of the call the physician/provider feels a video visit is not appropriate, you will not be charged for this service.\"    Patient has given verbal consent for Video visit? Yes  How would you like to obtain your AVS? MyChart  If you are dropped from the video visit, the video invite should be resent to: Text to cell phone:  [phone]  Will anyone else be joining your video visit? No        Video-Visit Details    Type of service:  Video Visit    Video Start Time: 3:04 PM  Video End Time: 3:28 PM    Originating Location (pt. Location): Home    Distant Location (provider location):  [facility] [place]     Platform used for Video Visit: StudyApps    During this virtual visit the patient is located in [place], patient verifies this as the location during the entirety of this visit.     Return Bariatric Nutrition Consultation Note    Reason For Visit: Nutrition Assessment    Stephanie Sanches is a 55 year old presenting today for follow-up bariatric nutrition consult.   Pt is interested in laparoscopic sleeve gastrectomy with Dr. Rosales expected surgery in March 2021.  Patient is accompanied by self.  This is pt's " "second of 3 required nutrition visits prior to surgery.     Pt referred by Farida Seymour NP on December 2, 2020.  Patient with Co-morbidities of obesity including:  Type II DM yes  Renal Failure no  Sleep apnea no  Hypertension no   Dyslipidemia no  Joint pain no  Back pain no  GERD no     Pt's history is also significant for NAM, followed by GI     Support System Reviewed With Patient 11/30/2020   Who do you have in your support network that can be available to help you for the first 2 weeks after surgery? Friends, boyfriend and family members   Who can you count on for support throughout your weight loss surgery journey? Friends, boyfriend and family members       ANTHROPOMETRICS:  Estimated body mass index is 50.31 kg/m  as calculated from the following:    Height as of 12/2/20: 1.715 m (5' 7.5\").    Weight as of 12/2/20: 147.9 kg (326 lb).   Current weight: Not taken one since November     Required weight loss goal pre-op: 20 lbs from initial consult weight (goal weight 306 lbs or less before surgery)       11/30/2020   I have tried the following methods to lose weight Watching portions or calories, Exercise, Slimfast, Prescription Medications, Physician directed program       Weight Loss Questions Reviewed With Patient 11/30/2020   How long have you been overweight? Since early childhood       SUPPLEMENT INFORMATION:  Calcium carbonate 600 mg BID  Vitamin 50,000 international unit(s) biweekly   Probiotic  Children chewable MVI: equate.     NUTRITION HISTORY:    Diet Recall:  B: Premier protein   L: leftovers, pizza roll, sandiwch, homemade soup, chicken   D: pizza roll, sandwich, homemade soup, chicken OR taco salad  Beverages: 32-64 oz practicing sipping, Skim milk instead of having pop   Try really hard not eat past 7 pm     Progress Towards previous goals:  Relating To Eating:  Eat slowly (20-30 minutes per meal), chewing foods well (25 chews per bite/applesauce consistency)-Improving, eating smaller palte " "and protein onf side eating slowly for one meal a day.   9\" Plate method (1/2 plate non-starchy vegetables/fruit, 1/4 plate lean protein, 1/4 plate whole grain starch - no more than 1/2 cup carb/meal)-Improving, Taco salad, not snacking     Relating to beverages:  Reduce caffeine/carbonation/calorie containing beverages-Imprivng 1 cup of coffee in the morning, mostly water, 1-2 dmoothies per week, no apple juice, sometimes sugarfree.    Separate fluids from meals by 30 minutes before, during, and after eating - continues, Improving sipp   4-8 oz milk or less per day -3 glass milk         ADDITIONAL INFORMATION:    Dining Out History Reviewed With Patient 11/30/2020   How often do you dine out? Around once a week.   Where do you dine out? (select all that apply) fast food chains, take out   What types of food do you order when you dine out? Des, Ino, Haley, Ashley       Physical Activity Reviewed With Patient 11/30/2020   How often do you exercise? 3 to 4 times per week   What is the duration of your exercise (in minutes)? 15 Minutes   What types of exercise do you do? walking, other   What keeps you from being more active?  I am as active as I can possbily be, Pain     Exercise: Sit scyle 3x a week. Slowly arm exercise with weights, or soup cans from the PT.     NUTRITION DIAGNOSIS:  Obesity r/t long history of self-monitoring deficit and excessive energy intake aeb BMI >30 kg/m2.-Continue    INTERVENTION:  Intervention Provided/Education Provided:   1. Reviewed Previous Goals:   2. Reviewed on post-op diet guidelines, vitamins/minerals essential post-operatively, GI anatomy of bariatric surgeries, ways to help prepare for post-op diet guidelines pre-operatively, portion/calorie-control, mindful eating and sources of protein.  3. Praised patient for behavior and dietary changes.   4. Patient demonstrates understanding.  4. AVS via [employer]ant[employer]     Questions Reviewed With Patient 11/30/2020   How ready are you to " "make changes regarding your weight? Number 1 = Not ready at all to make changes up to 10 = very ready. 10   How confident are you that you can change? 1 = Not confident that you will be successful making changes up to 10 = very confident. 10       Expected Engagement: good    GOALS:  Relating To Eating:  Eat slowly (20-30 minutes per meal), chewing foods well (25 chews per bite/applesauce consistency)  9\" Plate method (1/2 plate non-starchy vegetables/fruit, 1/4 plate lean protein, 1/4 plate whole grain starch - no more than 1/2 cup carb/meal)    Relating to beverages:  Reduce caffeine/carbonation/calorie containing beverages  Separate fluids from meals by 30 minutes before, during, and after eating   4-8 oz milk or less per day     Initial Consult / Weight Loss     My Plate Planner_English - Pt Education  [email or web address]    Protein Sources for Weight Loss  [email or web address]     Carbohydrates  [email or web address]     Time spent with patient: 24 minutes.  Lety Beal, MS, RD, LD      "

## 2021-01-06 ENCOUNTER — VIRTUAL VISIT (OUTPATIENT)
Dept: ENDOCRINOLOGY | Facility: CLINIC | Age: 56
End: 2021-01-06
Payer: COMMERCIAL

## 2021-01-06 DIAGNOSIS — E11.8 DIABETES MELLITUS TYPE 2 WITH COMPLICATIONS (H): ICD-10-CM

## 2021-01-06 DIAGNOSIS — Z71.3 NUTRITIONAL COUNSELING: ICD-10-CM

## 2021-01-06 DIAGNOSIS — E66.01 CLASS 3 SEVERE OBESITY DUE TO EXCESS CALORIES WITH SERIOUS COMORBIDITY AND BODY MASS INDEX (BMI) OF 50.0 TO 59.9 IN ADULT (H): Primary | ICD-10-CM

## 2021-01-06 DIAGNOSIS — E66.813 CLASS 3 SEVERE OBESITY DUE TO EXCESS CALORIES WITH SERIOUS COMORBIDITY AND BODY MASS INDEX (BMI) OF 50.0 TO 59.9 IN ADULT (H): Primary | ICD-10-CM

## 2021-01-06 PROCEDURE — 97803 MED NUTRITION INDIV SUBSEQ: CPT | Mod: GT | Performed by: DIETITIAN, REGISTERED

## 2021-01-06 NOTE — LETTER
"1/6/2021       RE: Stephanie Sanches  [address] [address]     Dear Colleague,    Thank you for referring your patient, Stephanie Sanches, to the [facility]NA[facility] [place] at [facility]. Please see a copy of my visit note below.    Stephanie Sanches is a 55 year old female who is being evaluated via a billable video visit.      The patient has been notified of following:     \"This video visit will be conducted via a call between you and your physician/provider. We have found that certain health care needs can be provided without the need for an in-person physical exam.  This service lets us provide the care you need with a video conversation.  If a prescription is necessary we can send it directly to your pharmacy.  If lab work is needed we can place an order for that and you can then stop by our lab to have the test done at a later time.    Video visits are billed at different rates depending on your insurance coverage.  Please reach out to your insurance provider with any questions.    If during the course of the call the physician/provider feels a video visit is not appropriate, you will not be charged for this service.\"    Patient has given verbal consent for Video visit? Yes  How would you like to obtain your AVS? MyChart  If you are dropped from the video visit, the video invite should be resent to: Text to cell phone:  [phone]  Will anyone else be joining your video visit? No        Video-Visit Details    Type of service:  Video Visit    Video Start Time: 3:04 PM  Video End Time: 3:28 PM    Originating Location (pt. Location): Home    Distant Location (provider location):  [facility] [place]     Platform used for Video Visit: [employer]    During this virtual visit the patient is located in [place], patient verifies this as the location during the entirety of this visit.     Return Bariatric " "Nutrition Consultation Note    Reason For Visit: Nutrition Assessment    Stephanie Sanches is a 55 year old presenting today for follow-up bariatric nutrition consult.   Pt is interested in laparoscopic sleeve gastrectomy with Dr. Rosales expected surgery in March 2021.  Patient is accompanied by self.  This is pt's second of 3 required nutrition visits prior to surgery.     Pt referred by Farida Seymour NP on December 2, 2020.  Patient with Co-morbidities of obesity including:  Type II DM yes  Renal Failure no  Sleep apnea no  Hypertension no   Dyslipidemia no  Joint pain no  Back pain no  GERD no     Pt's history is also significant for NAM, followed by GI     Support System Reviewed With Patient 11/30/2020   Who do you have in your support network that can be available to help you for the first 2 weeks after surgery? Friends, boyfriend and family members   Who can you count on for support throughout your weight loss surgery journey? Friends, boyfriend and family members       ANTHROPOMETRICS:  Estimated body mass index is 50.31 kg/m  as calculated from the following:    Height as of 12/2/20: 1.715 m (5' 7.5\").    Weight as of 12/2/20: 147.9 kg (326 lb).   Current weight: Not taken one since November     Required weight loss goal pre-op: 20 lbs from initial consult weight (goal weight 306 lbs or less before surgery)       11/30/2020   I have tried the following methods to lose weight Watching portions or calories, Exercise, Slimfast, Prescription Medications, Physician directed program       Weight Loss Questions Reviewed With Patient 11/30/2020   How long have you been overweight? Since early childhood       SUPPLEMENT INFORMATION:  Calcium carbonate 600 mg BID  Vitamin 50,000 international unit(s) biweekly   Probiotic  Children chewable MVI: equate.     NUTRITION HISTORY:    Diet Recall:  B: Premier protein   L: leftovers, pizza roll, sandiwch, homemade soup, chicken   D: pizza roll, sandwich, homemade soup, chicken " "OR taco salad  Beverages: 32-64 oz practicing sipping, Skim milk instead of having pop   Try really hard not eat past 7 pm     Progress Towards previous goals:  Relating To Eating:  Eat slowly (20-30 minutes per meal), chewing foods well (25 chews per bite/applesauce consistency)-Improving, eating smaller palte and protein onf side eating slowly for one meal a day.   9\" Plate method (1/2 plate non-starchy vegetables/fruit, 1/4 plate lean protein, 1/4 plate whole grain starch - no more than 1/2 cup carb/meal)-Improving, Taco salad, not snacking     Relating to beverages:  Reduce caffeine/carbonation/calorie containing beverages-Imprivng 1 cup of coffee in the morning, mostly water, 1-2 dmoothies per week, no apple juice, sometimes sugarfree.    Separate fluids from meals by 30 minutes before, during, and after eating - continues, Improving sipp   4-8 oz milk or less per day -3 glass milk     ADDITIONAL INFORMATION:    Dining Out History Reviewed With Patient 11/30/2020   How often do you dine out? Around once a week.   Where do you dine out? (select all that apply) fast food chains, take out   What types of food do you order when you dine out? Ino Nunes, Ashley De Leon       Physical Activity Reviewed With Patient 11/30/2020   How often do you exercise? 3 to 4 times per week   What is the duration of your exercise (in minutes)? 15 Minutes   What types of exercise do you do? walking, other   What keeps you from being more active?  I am as active as I can possbily be, Pain     Exercise: Sit scyle 3x a week. Slowly arm exercise with weights, or soup cans from the PT.     NUTRITION DIAGNOSIS:  Obesity r/t long history of self-monitoring deficit and excessive energy intake aeb BMI >30 kg/m2.-Continue    INTERVENTION:  Intervention Provided/Education Provided:   1. Reviewed Previous Goals:   2. Reviewed on post-op diet guidelines, vitamins/minerals essential post-operatively, GI anatomy of bariatric surgeries, ways to " "help prepare for post-op diet guidelines pre-operatively, portion/calorie-control, mindful eating and sources of protein.  3. Praised patient for behavior and dietary changes.   4. Patient demonstrates understanding.  4. AVS via [email or web address]     Questions Reviewed With Patient 11/30/2020   How ready are you to make changes regarding your weight? Number 1 = Not ready at all to make changes up to 10 = very ready. 10   How confident are you that you can change? 1 = Not confident that you will be successful making changes up to 10 = very confident. 10     Expected Engagement: good    GOALS:  Relating To Eating:  Eat slowly (20-30 minutes per meal), chewing foods well (25 chews per bite/applesauce consistency)  9\" Plate method (1/2 plate non-starchy vegetables/fruit, 1/4 plate lean protein, 1/4 plate whole grain starch - no more than 1/2 cup carb/meal)    Relating to beverages:  Reduce caffeine/carbonation/calorie containing beverages  Separate fluids from meals by 30 minutes before, during, and after eating   4-8 oz milk or less per day     Initial Consult / Weight Loss     My Plate Planner_English - Pt Education  [email or web address]    Protein Sources for Weight Loss  [email or web address]     Carbohydrates  [email or web address]     Time spent with patient: 24 minutes.  Lety Beal, MS, RD, LD  "

## 2021-01-09 ENCOUNTER — HEALTH MAINTENANCE LETTER (OUTPATIENT)
Age: 56
End: 2021-01-09

## 2021-01-10 NOTE — PATIENT INSTRUCTIONS
"GOALS:  Relating To Eating:  Eat slowly (20-30 minutes per meal), chewing foods well (25 chews per bite/applesauce consistency)  9\" Plate method (1/2 plate non-starchy vegetables/fruit, 1/4 plate lean protein, 1/4 plate whole grain starch - no more than 1/2 cup carb/meal)    Relating to beverages:  Reduce caffeine/carbonation/calorie containing beverages  Separate fluids from meals by 30 minutes before, during, and after eating   4-8 oz milk or less per day     Initial Consult / Weight Loss     My Plate Planner_English - Pt Education  [email or web address]    Protein Sources for Weight Loss  [email or web address]     Carbohydrates  [email or web address]       Follow up with RD in one month    Lety Beal, MS, RD, LD  If you need to schedule or reschedule with a dietitian please call [phone].  "

## 2021-01-20 ENCOUNTER — VIRTUAL VISIT (OUTPATIENT)
Dept: ENDOCRINOLOGY | Facility: CLINIC | Age: 56
End: 2021-01-20
Payer: COMMERCIAL

## 2021-01-20 VITALS — BODY MASS INDEX: 44.41 KG/M2 | WEIGHT: 293 LBS | HEIGHT: 68 IN

## 2021-01-20 DIAGNOSIS — E66.813 CLASS 3 SEVERE OBESITY DUE TO EXCESS CALORIES WITH SERIOUS COMORBIDITY AND BODY MASS INDEX (BMI) OF 50.0 TO 59.9 IN ADULT (H): Primary | ICD-10-CM

## 2021-01-20 DIAGNOSIS — K75.81 NASH (NONALCOHOLIC STEATOHEPATITIS): ICD-10-CM

## 2021-01-20 DIAGNOSIS — E66.01 CLASS 3 SEVERE OBESITY DUE TO EXCESS CALORIES WITH SERIOUS COMORBIDITY AND BODY MASS INDEX (BMI) OF 50.0 TO 59.9 IN ADULT (H): Primary | ICD-10-CM

## 2021-01-20 PROCEDURE — 99212 OFFICE O/P EST SF 10 MIN: CPT | Mod: 95 | Performed by: SURGERY

## 2021-01-20 RX ORDER — FLUOCINONIDE 0.5 MG/G
OINTMENT TOPICAL PRN
COMMUNITY
Start: 2021-01-15 | End: 2021-07-14

## 2021-01-20 ASSESSMENT — PAIN SCALES - GENERAL: PAINLEVEL: NO PAIN (0)

## 2021-01-20 ASSESSMENT — MIFFLIN-ST. JEOR: SCORE: 2133.36

## 2021-01-20 NOTE — NURSING NOTE
"Chief Complaint   Patient presents with     Consult     Nain fuentes and don.       Vitals:    01/20/21 0927   Weight: 148.8 kg (328 lb)   Height: 1.715 m (5' 7.5\")       Body mass index is 50.61 kg/m .                            WILLIE STRINGER, EMT    "

## 2021-01-20 NOTE — LETTER
1/20/2021       RE: Stephanie Sanches  [address] [id number]     Dear Colleague,    Thank you for referring your patient, Stephanie Sanches, to the [facility]NAGEMENT CLIN[facility] [place] at [facility]. Please see a copy of my visit note below.    Lorena is a 55 year old who is being evaluated via a billable video visit.      How would you like to obtain your AVS? MyChart  If the video visit is dropped, the invitation should be resent by: Text to cell phone: [phone]  Will anyone else be joining your video visit? No      This is a video visit starting at 957 ending at 10:10 AM.  This is a 55-year-old female with a history of nonalcoholic steatohepatitis.  Her last liver biopsy was about a year and a half ago showing 0-1 fibrosis.  She also has been followed using a FibroScan.  Patient has noninsulin-dependent type 2 diabetes with a management program of Metformin.  She recently stopped Victoza.  Her hemoglobin A1c is slightly elevated at 7.1% in August 2020.  In addition to for type 2 diabetes has a history of degenerative joint disease.  She has had knee arthroscopy in the past.  Other past surgical history hysterectomy, tubal ligation, wrist surgery to remove a cyst and ankle surgery.  She also has a history of nasal surgery in the past.  Patient denies sleep apnea or snoring.  Her psychologic evaluation was done last week.  She is awaiting the final report from that.  Its potential risks and complications.  We talked about the potential need for hiatal hernia repair at the time of surgery.  She seems well motivated.  She will need to lose an additional 9 pounds in anticipation for surgery.  I like to see her in 1 month.  For follow-up and weight check.  I do think it is reasonable to repeat another liver biopsy at the time of surgery given the last one was a year and a half ago.  I might recommend that she has one 1 year after surgery.  She  understands this plan and would like to proceed.      Sincerely,    Martin Zambrano MD

## 2021-01-20 NOTE — PROGRESS NOTES
Lorena is a 55 year old who is being evaluated via a billable video visit.      How would you like to obtain your AVS? [employer]  If the video visit is dropped, the invitation should be resent by: Text to cell phone: [phone]  Will anyone else be joining your video visit? No      This is a video visit starting at 957 ending at 10:10 AM.  This is a 55-year-old female with a history of nonalcoholic steatohepatitis.  Her last liver biopsy was about a year and a half ago showing 0-1 fibrosis.  She also has been followed using a FibroScan.  Patient has noninsulin-dependent type 2 diabetes with a management program of Metformin.  She recently stopped Victoza.  Her hemoglobin A1c is slightly elevated at 7.1% in August 2020.  In addition to for type 2 diabetes has a history of degenerative joint disease.  She has had knee arthroscopy in the past.  Other past surgical history hysterectomy, tubal ligation, wrist surgery to remove a cyst and ankle surgery.  She also has a history of nasal surgery in the past.  Patient denies sleep apnea or snoring.  Her psychologic evaluation was done last week.  She is awaiting the final report from that.  Its potential risks and complications.  We talked about the potential need for hiatal hernia repair at the time of surgery.  She seems well motivated.  She will need to lose an additional 9 pounds in anticipation for surgery.  I like to see her in 1 month.  For follow-up and weight check.  I do think it is reasonable to repeat another liver biopsy at the time of surgery given the last one was a year and a half ago.  I might recommend that she has one 1 year after surgery.  She understands this plan and would like to proceed.

## 2021-01-21 ENCOUNTER — TELEPHONE (OUTPATIENT)
Dept: ENDOCRINOLOGY | Facility: CLINIC | Age: 56
End: 2021-01-21

## 2021-01-21 NOTE — TELEPHONE ENCOUNTER
Left VM message requesting patient to call me to schedule in-person clinic visit with Dr. Zambrano on either 2/17 or 2/14. Patient lives in [place], [place], asked if she might have relatives or friends who she could stay with because only 7 a.m. and 1 8:30 a.m. appointment is available. Patient has my direct call back number.

## 2021-01-21 NOTE — TELEPHONE ENCOUNTER
[facility] [place] [phone] to request initial consult note, initial dietary consult and weight loss participation form be faxed to me at [phone].

## 2021-01-21 NOTE — TELEPHONE ENCOUNTER
Patient called back to discuss in-person clinic visit with Dr Zambrano. I offered 2/17 at 7 or 2/24 at 7 or 8:30. She would like 2/17 at 7 a.m. She [place] relatives or friends in [place] but states [facility] has a program where they will pay for her lodging.

## 2021-01-21 NOTE — TELEPHONE ENCOUNTER
Called patient to discuss who she was seeing for her psychological evaluation. States she saw Fide Hoover on January 12 and tomorrow has MMPI testing scheduled.   She states she has sent a letter to her pcp, Dr Benson Allen - [facility]  Also to Dr. Anay Bean, who wanted to see the patient before she wrote the letter patient requested.  She has been seen at the [facility] in [place] and I told her I would request those records.   Letter dated December 15, 2020, from Venancio Mann PA-C in GI has been scanned in Epic stating no precautions or preclusions for her undergoing bariatric surgery for treatment of her comorbidities.

## 2021-01-22 ENCOUNTER — TRANSFERRED RECORDS (OUTPATIENT)
Dept: HEALTH INFORMATION MANAGEMENT | Facility: CLINIC | Age: 56
End: 2021-01-22

## 2021-02-05 ENCOUNTER — TELEPHONE (OUTPATIENT)
Dept: ENDOCRINOLOGY | Facility: CLINIC | Age: 56
End: 2021-02-05

## 2021-02-05 NOTE — TELEPHONE ENCOUNTER
Left message with ROSELINE Weathers at [facility] that Stephanie Sanches is schedule for surgery on 4/5/2021 contingent on us getting a letter of support from her primary care provider and her endocrinologist.  Documentation can be faxed to [phone].

## 2021-02-08 ENCOUNTER — MEDICAL CORRESPONDENCE (OUTPATIENT)
Dept: HEALTH INFORMATION MANAGEMENT | Facility: CLINIC | Age: 56
End: 2021-02-08

## 2021-02-08 ENCOUNTER — VIRTUAL VISIT (OUTPATIENT)
Dept: ENDOCRINOLOGY | Facility: CLINIC | Age: 56
End: 2021-02-08
Payer: COMMERCIAL

## 2021-02-08 DIAGNOSIS — E66.813 CLASS 3 SEVERE OBESITY DUE TO EXCESS CALORIES WITH SERIOUS COMORBIDITY AND BODY MASS INDEX (BMI) OF 50.0 TO 59.9 IN ADULT (H): Primary | ICD-10-CM

## 2021-02-08 DIAGNOSIS — E11.8 DIABETES MELLITUS TYPE 2 WITH COMPLICATIONS (H): ICD-10-CM

## 2021-02-08 DIAGNOSIS — Z71.3 NUTRITIONAL COUNSELING: ICD-10-CM

## 2021-02-08 DIAGNOSIS — E66.01 CLASS 3 SEVERE OBESITY DUE TO EXCESS CALORIES WITH SERIOUS COMORBIDITY AND BODY MASS INDEX (BMI) OF 50.0 TO 59.9 IN ADULT (H): Primary | ICD-10-CM

## 2021-02-08 PROCEDURE — 97803 MED NUTRITION INDIV SUBSEQ: CPT | Mod: 95 | Performed by: DIETITIAN, REGISTERED

## 2021-02-08 NOTE — PROGRESS NOTES
"Stephanie Sanches is a 55 year old female who is being evaluated via a billable video visit.      The patient has been notified of following:     \"This video visit will be conducted via a call between you and your physician/provider. We have found that certain health care needs can be provided without the need for an in-person physical exam.  This service lets us provide the care you need with a video conversation.  If a prescription is necessary we can send it directly to your pharmacy.  If lab work is needed we can place an order for that and you can then stop by our lab to have the test done at a later time.    Video visits are billed at different rates depending on your insurance coverage.  Please reach out to your insurance provider with any questions.    If during the course of the call the physician/provider feels a video visit is not appropriate, you will not be charged for this service.\"    Patient has given verbal consent for Video visit? Yes  How would you like to obtain your AVS? MyChart  If you are dropped from the video visit, the video invite should be resent to: Text to cell phone:  [phone]  Will anyone else be joining your video visit? No        Video-Visit Details    Type of service:  Video Visit    Video Start Time: 2:58 PM  Video End Time: 3:34 PM    Originating Location (pt. Location): Home    Distant Location (provider location):  [facility]NAGEMENT CLINIC [place]     Platform used for Video Visit: [employer]    During this virtual visit the patient is located in [place], patient verifies this as the location during the entirety of this visit.     Return Bariatric Nutrition Consultation Note    Reason For Visit: Nutrition Assessment    Stephanie Sanches is a 55 year old presenting today for follow-up bariatric nutrition consult.   Pt is interested in laparoscopic sleeve gastrectomy with Dr. Rosales expected surgery in March 2021.  Patient is accompanied by self.  This is pt's 3 of " "3 required nutrition visits prior to surgery.     Pt referred by Farida Seymour NP on December 2, 2020.  Patient with Co-morbidities of obesity including:  Type II DM yes  Renal Failure no  Sleep apnea no  Hypertension no   Dyslipidemia no  Joint pain no  Back pain no  GERD no     Pt's history is also significant for NAM, followed by GI     Support System Reviewed With Patient 11/30/2020   Who do you have in your support network that can be available to help you for the first 2 weeks after surgery? Friends, boyfriend and family members   Who can you count on for support throughout your weight loss surgery journey? Friends, boyfriend and family members       ANTHROPOMETRICS:  Estimated body mass index is 50.61 kg/m  as calculated from the following:    Height as of 1/20/21: 1.715 m (5' 7.5\").    Weight as of 1/20/21: 148.8 kg (328 lb).   Current weight: 321lb ( per pt's report)     Required weight loss goal pre-op: 20 lbs from initial consult weight (goal weight 316 lbs or less before surgery)       11/30/2020   I have tried the following methods to lose weight Watching portions or calories, Exercise, Slimfast, Prescription Medications, Physician directed program       Weight Loss Questions Reviewed With Patient 11/30/2020   How long have you been overweight? Since early childhood       SUPPLEMENT INFORMATION:  Calcium carbonate 600 mg BID  Vitamin 50,000 international unit(s) biweekly   Probiotic  Children chewable MVI: equate.     NUTRITION HISTORY:  Notices over the past month she has not been craving sugar. She has also started a protein smoothie in the morning (using isopure protein powder)     Progress Towards previous goals:  Relating To Eating:  Eat slowly (20-30 minutes per meal), chewing foods well (25 chews per bite/applesauce consistency)-Improving, practicing during her lunch, she notices she is full sooner.   9\" Plate method (1/2 plate non-starchy vegetables/fruit, 1/4 plate lean protein, 1/4 plate whole " grain starch - no more than 1/2 cup carb/meal)-Improving, salad or vegetable to add bulk, 5 pizza rolls and cottage cheese.     Relating to beverages:  Reduce caffeine/carbonation/calorie containing beverages-Met,water, 2 4- oz glass of milk of Protein O2, coffee in the morning.   Separate fluids from meals by 30 minutes before, during, and after eating -Continues   4-8 oz milk or less per day -Met        ADDITIONAL INFORMATION:    Dining Out History Reviewed With Patient 11/30/2020   How often do you dine out? Around once a week.   Where do you dine out? (select all that apply) fast food chains, take out   What types of food do you order when you dine out? Ino Nunes, Ashley De Leon       Physical Activity Reviewed With Patient 11/30/2020   How often do you exercise? 3 to 4 times per week   What is the duration of your exercise (in minutes)? 15 Minutes   What types of exercise do you do? walking, other   What keeps you from being more active?  I am as active as I can possbily be, Pain     Exercise: Sit scyle 3x a week. Slowly arm exercise with weights, or soup cans from the PT.     NUTRITION DIAGNOSIS:  Obesity r/t long history of self-monitoring deficit and excessive energy intake aeb BMI >30 kg/m2.-Continue    INTERVENTION:  Intervention Provided/Education Provided:   1. Reviewed Previous Goals:   2. Reviewed on post-op diet guidelines, vitamins/minerals essential post-operatively, GI anatomy of bariatric surgeries, ways to help prepare for post-op diet guidelines pre-operatively, portion/calorie-control, mindful eating and sources of protein.  3. Praised patient for behavior and dietary changes.    4. Patient demonstrates understanding.  5. AVS via [employer]     Questions Reviewed With Patient 11/30/2020   How ready are you to make changes regarding your weight? Number 1 = Not ready at all to make changes up to 10 = very ready. 10   How confident are you that you can change? 1 = Not confident that you will be  "successful making changes up to 10 = very confident. 10       Expected Engagement: good    GOALS:  Relating To Eating:  Eat slowly (20-30 minutes per meal), chewing foods well (25 chews per bite/applesauce consistency)  9\" Plate method (1/2 plate non-starchy vegetables/fruit, 1/4 plate lean protein, 1/4 plate whole grain starch - no more than 1/2 cup carb/meal)    Relating to beverages:  Reduce caffeine/carbonation/calorie containing beverages  Separate fluids from meals by 30 minutes before, during, and after eating   4-8 oz milk or less per day     Initial Consult / Weight Loss     My Plate Planner_English - Pt Education  [email or web address]    Protein Sources for Weight Loss  [email or web address]     Carbohydrates  [email or web address]     Post-Bariatric Surgery Online Recipe Resources:  Online:    Simpler Recipes: https://www.[employer]/bariatric-surgery/recipes    [place] cups and muffin tin portion sized recipes: https://www.[employer]/blog/10-single-serving-meals-you-need-in-your-bariatric-life/    Some recipes on this site have larger than 1 cup portion size pictures: http://www.muschealth.org/weight-loss-surgery/nutrition/recipe-corner.html     https://www.Doctor Evidence.me/25-bariatric-friendly-weeknight-meals/    Crockpot recipes: https://www.Doctor Evidence.me/25-bariatric-friendly-crockpot-recipes/    https://[employer]/recipes/   Books:  Fresh Start Bariatric Cookbook by Lauren Kessler, MS, RDN, CD - $13 - Excellent cookbook with post-op recipes and many other resources to check out for ongoing support after surgery  Eating Well After Weight Loss Surgery by Ericka Devine and Jesus Alberto Colby - $10 - Great cookbook with recipes for each diet stage after surgery and recommended portion sizes. Slightly more intensive cooking recipes.  Bariatric Mindset Success by Irene Mitchell - $14.24 on Amazon - book that helps with prevention of weight regain after surgery. " Helps train your brain for long term success with weight loss after surgery.    Tools:  D-Ã‰G Thermoset Dish Sets: bariatric meal size bowls and plates with built in measurement designs on the dishes    Time spent with patient: 36 minutes.  Lety Beal, MS, RD, LD

## 2021-02-08 NOTE — LETTER
"2/8/2021       RE: Stephanie Sanches  [address] [id number]     Dear Colleague,    Thank you for referring your patient, Stephanie Sanches, to the [facility] [place] at [facility]. Please see a copy of my visit note below.    Stephanie Sanches is a 55 year old female who is being evaluated via a billable video visit.      The patient has been notified of following:     \"This video visit will be conducted via a call between you and your physician/provider. We have found that certain health care needs can be provided without the need for an in-person physical exam.  This service lets us provide the care you need with a video conversation.  If a prescription is necessary we can send it directly to your pharmacy.  If lab work is needed we can place an order for that and you can then stop by our lab to have the test done at a later time.    Video visits are billed at different rates depending on your insurance coverage.  Please reach out to your insurance provider with any questions.    If during the course of the call the physician/provider feels a video visit is not appropriate, you will not be charged for this service.\"    Patient has given verbal consent for Video visit? Yes  How would you like to obtain your AVS? MyChart  If you are dropped from the video visit, the video invite should be resent to: Text to cell phone:  [phone]  Will anyone else be joining your video visit? No        Video-Visit Details    Type of service:  Video Visit    Video Start Time: 2:58 PM  Video End Time: 3:34 PM    Originating Location (pt. Location): Home    Distant Location (provider location):  [facility] [place]     Platform used for Video Visit: LicenseStream    During this virtual visit the patient is located in [place], patient verifies this as the location during the entirety of this visit.     Return " "Bariatric Nutrition Consultation Note    Reason For Visit: Nutrition Assessment    Stephanie Sanches is a 55 year old presenting today for follow-up bariatric nutrition consult.   Pt is interested in laparoscopic sleeve gastrectomy with Dr. Rosales expected surgery in March 2021.  Patient is accompanied by self.  This is pt's 3 of 3 required nutrition visits prior to surgery.     Pt referred by Farida Seymour NP on December 2, 2020.  Patient with Co-morbidities of obesity including:  Type II DM yes  Renal Failure no  Sleep apnea no  Hypertension no   Dyslipidemia no  Joint pain no  Back pain no  GERD no     Pt's history is also significant for NAM, followed by GI     Support System Reviewed With Patient 11/30/2020   Who do you have in your support network that can be available to help you for the first 2 weeks after surgery? Friends, boyfriend and family members   Who can you count on for support throughout your weight loss surgery journey? Friends, boyfriend and family members       ANTHROPOMETRICS:  Estimated body mass index is 50.61 kg/m  as calculated from the following:    Height as of 1/20/21: 1.715 m (5' 7.5\").    Weight as of 1/20/21: 148.8 kg (328 lb).   Current weight: 321lb ( per pt's report)     Required weight loss goal pre-op: 20 lbs from initial consult weight (goal weight 316 lbs or less before surgery)       11/30/2020   I have tried the following methods to lose weight Watching portions or calories, Exercise, Slimfast, Prescription Medications, Physician directed program       Weight Loss Questions Reviewed With Patient 11/30/2020   How long have you been overweight? Since early childhood       SUPPLEMENT INFORMATION:  Calcium carbonate 600 mg BID  Vitamin 50,000 international unit(s) biweekly   Probiotic  Children chewable MVI: equate.     NUTRITION HISTORY:  Notices over the past month she has not been craving sugar. She has also started a protein smoothie in the morning (using isopure protein " "powder)     Progress Towards previous goals:  Relating To Eating:  Eat slowly (20-30 minutes per meal), chewing foods well (25 chews per bite/applesauce consistency)-Improving, practicing during her lunch, she notices she is full sooner.   9\" Plate method (1/2 plate non-starchy vegetables/fruit, 1/4 plate lean protein, 1/4 plate whole grain starch - no more than 1/2 cup carb/meal)-Improving, salad or vegetable to add bulk, 5 pizza rolls and cottage cheese.     Relating to beverages:  Reduce caffeine/carbonation/calorie containing beverages-Met,water, 2 4- oz glass of milk of Protein O2, coffee in the morning.   Separate fluids from meals by 30 minutes before, during, and after eating -Continues   4-8 oz milk or less per day -Met        ADDITIONAL INFORMATION:    Dining Out History Reviewed With Patient 11/30/2020   How often do you dine out? Around once a week.   Where do you dine out? (select all that apply) fast food chains, take out   What types of food do you order when you dine out? Ino Nunes, Haley, Ashley       Physical Activity Reviewed With Patient 11/30/2020   How often do you exercise? 3 to 4 times per week   What is the duration of your exercise (in minutes)? 15 Minutes   What types of exercise do you do? walking, other   What keeps you from being more active?  I am as active as I can possbily be, Pain     Exercise: Sit scyle 3x a week. Slowly arm exercise with weights, or soup cans from the PT.     NUTRITION DIAGNOSIS:  Obesity r/t long history of self-monitoring deficit and excessive energy intake aeb BMI >30 kg/m2.-Continue    INTERVENTION:  Intervention Provided/Education Provided:   1. Reviewed Previous Goals:   2. Reviewed on post-op diet guidelines, vitamins/minerals essential post-operatively, GI anatomy of bariatric surgeries, ways to help prepare for post-op diet guidelines pre-operatively, portion/calorie-control, mindful eating and sources of protein.  3. Praised patient for behavior and " "dietary changes.    4. Patient demonstrates understanding.  5. AVS via My Dog Bowlhart     Questions Reviewed With Patient 11/30/2020   How ready are you to make changes regarding your weight? Number 1 = Not ready at all to make changes up to 10 = very ready. 10   How confident are you that you can change? 1 = Not confident that you will be successful making changes up to 10 = very confident. 10       Expected Engagement: good    GOALS:  Relating To Eating:  Eat slowly (20-30 minutes per meal), chewing foods well (25 chews per bite/applesauce consistency)  9\" Plate method (1/2 plate non-starchy vegetables/fruit, 1/4 plate lean protein, 1/4 plate whole grain starch - no more than 1/2 cup carb/meal)    Relating to beverages:  Reduce caffeine/carbonation/calorie containing beverages  Separate fluids from meals by 30 minutes before, during, and after eating   4-8 oz milk or less per day     Initial Consult / Weight Loss     My Plate Planner_English - Pt Education  [email or web address]    Protein Sources for Weight Loss  [email or web address]     Carbohydrates  [email or web address]     Post-Bariatric Surgery Online Recipe Resources:  Online:    Simpler Recipes: https://www.Circle of Moms/bariatric-surgery/recipes    Wonton cups and muffin tin portion sized recipes: https://www.[employer]/blog/10-single-serving-meals-you-need-in-your-bariatric-life/    Some recipes on this site have larger than 1 cup portion size pictures: http://www.muschealth.org/weight-loss-surgery/nutrition/recipe-corner.html     https://www.foodcoach.me/25-bariatric-friendly-weeknight-meals/    Crockpot recipes: https://www.foodcoach.me/25-bariatric-friendly-crockpot-recipes/    https://Netcontinuum.Grower's Secret/recipes/   Books:  Fresh Start Bariatric Cookbook by Lauren Kessler, MS, RDN, CD - $13 - Excellent cookbook with post-op recipes and many other resources to check out for ongoing support after surgery  Eating Well After " Weight Loss Surgery by Ericka Devine and Jesus Alberto Colby - $10 - Great cookbook with recipes for each diet stage after surgery and recommended portion sizes. Slightly more intensive cooking recipes.  Bariatric Mindset Success by Irene Mitchell - $14.24 on Amazon - book that helps with prevention of weight regain after surgery. Helps train your brain for long term success with weight loss after surgery.    Tools:  [employer] Dish Sets: bariatric meal size bowls and plates with built in measurement designs on the dishes    Time spent with patient: 36 minutes.  Lety Beal, MS, RD, LD

## 2021-02-16 ENCOUNTER — PREP FOR PROCEDURE (OUTPATIENT)
Dept: ENDOCRINOLOGY | Facility: CLINIC | Age: 56
End: 2021-02-16

## 2021-02-16 ENCOUNTER — CARE COORDINATION (OUTPATIENT)
Dept: ENDOCRINOLOGY | Facility: CLINIC | Age: 56
End: 2021-02-16

## 2021-02-16 DIAGNOSIS — Z01.818 PREOP TESTING: Primary | ICD-10-CM

## 2021-02-16 DIAGNOSIS — E66.01 MORBID OBESITY (H): Primary | ICD-10-CM

## 2021-02-16 RX ORDER — CLINDAMYCIN PHOSPHATE 900 MG/50ML
900 INJECTION, SOLUTION INTRAVENOUS SEE ADMIN INSTRUCTIONS
Status: CANCELLED | OUTPATIENT
Start: 2021-02-16

## 2021-02-16 RX ORDER — CLINDAMYCIN PHOSPHATE 900 MG/50ML
900 INJECTION, SOLUTION INTRAVENOUS
Status: CANCELLED | OUTPATIENT
Start: 2021-02-16

## 2021-02-16 NOTE — PROGRESS NOTES
Tasklist updated and sent to patient via Strands.    Bariatric Task List  Status:  Is patient a candidate for bariatric surgery?:  patient is a candidate for bariatric surgery -     Cleared to schedule surgeon consult?:  cleared to schedule surgeon consult - 2/17/2021 appt with Dr Zambrano. s   Status:  surgery evaluation in process -     Surgeon: Juan Manuel  -     Tentative surgery month/year: 4/5/2021 - work with dietitian to reach goal weight beforehand. -        Insurance: Insurance:  [employer] -        Patient Info: Initial Weight:  336 -     Date of Initial Weight/Height:  [facility]/2020 [facility]    Goal Weight (lbs):  316 -     Required Weight Loss:  20 lb -     Surgery Type:  sleeve gastrectomy - with liver biopsy per Dr Zambrano. s      Dietician Visits: Structured weight loss required by insurance?:    -     Dietician Visit 1:  Completed - 12/2    Dietician Visit 2:  Completed - 1/6/2021 in Epic. s   Dietician Visit 3:  Completed - 2/8/2021 in Epic. [facility]   Dietician Visit 4:    -     Dietician Visit additional:  Needed - Continue to meet monthly to reach pre-surgery goal weight and for postop diet teaching. Call [phone] to schedule. s      Psychological Evaluation: Psych eval:  Completed - Letter sent to pt 12/3/ - AS; 12/28/20 Dr Hoover - cleared for surgery. s      Lab Work: Complete Blood Count:  Completed - Ordered 12/2 - AS; 12/10/20-done. bks   Comprehensive Metabolic Panel:  Completed - Ordered 12/2 - AS; 12/10/20 done. bks   Vitamin D:  Completed - 11/12/20-done. bks   Hgb A1c:  Completed -     PTH:  Completed - Ordered 12/2 - AS; 12/10/20-done. bks   TSH:  Completed - 12/10/20-done. bks   Other:  Completed - 2/3/2020. bks      Consults/ Clearance: Endocrine:  Completed - Letter sent to pt 12/2 - AS;ROSELINE [facility]s at [facility]-(endocrinology) - 2/8/21 ltr via Right Fax, A1c at 6.1 to continue Metformin 1000mg BID until time of surgery and will likely not need it postop, to  "f/u 4-6 wks fter WLS   Other  Completed - Hepatology Letter sent to pt 12/2 - AS; 12/18/20 Venancio Mann PA-C-cleared in Care Everywhere note. bks          PCP: Establish care with PCP:  Completed -     Follow up with PCP:    -     PCP letter of support:  Needed - Letter sent to pt 12/2 - AS; Not received as of 2/16/2021. bks Please fax to [phone], attn: Dr Zambrano/GENESIS He. [facility]      Patient Education:  Information Session:  Needed - Viewed 12/3/2020. bks   Given \"Making your decision\" handout?:  Given \"\"Making your decision\"\" handout? - 12/2 - AS   Given support group information?:  support group contact information provided - 12/2 - AS   Attended support group?:    -     Support plan in place?:    -        Additional Surgery Requirements: Other Requirements:  Call [phone] for scheduling. bks - Covid test to be done 4 days before surgery date.    Other Requirements:  Someone from the Contact Center will call you to schedule. If you do not hear from them within the next couple of days, you can call [phone]. bks - Postop appointments at 1 week and 31+ days.        Final Tasks:  Before surgery online class:  Needed - Information to read. bks   Before surgery online class website link:  [email or web address]   After surgery online class:  Needed - Information to read. bks   After surgery online class website link:  [email or web address]   Nurse visit for weigh-in and information:  Needed - GENESIS Miranda will contact you to set this up. [facility]   Pre-assessment clinic visit with anesthesia team for H&P:  Needed - Call [phone] to schedule this for 3 weeks before surgery. [facility]   Final labs (Hgb, plt, T&S, UA):    -        Notes: Please register for the Get Well Loop when you get an email invitation and a surgery date.     The Get Well Loop will give you information via email or text messages that can help you be more successful before and after surgery.  It can also help answer any " questions you may have.   -

## 2021-02-17 ENCOUNTER — OFFICE VISIT (OUTPATIENT)
Dept: ENDOCRINOLOGY | Facility: CLINIC | Age: 56
End: 2021-02-17
Payer: COMMERCIAL

## 2021-02-17 VITALS
WEIGHT: 293 LBS | HEART RATE: 97 BPM | DIASTOLIC BLOOD PRESSURE: 89 MMHG | BODY MASS INDEX: 44.41 KG/M2 | SYSTOLIC BLOOD PRESSURE: 130 MMHG | OXYGEN SATURATION: 98 % | HEIGHT: 68 IN

## 2021-02-17 DIAGNOSIS — K75.81 NASH (NONALCOHOLIC STEATOHEPATITIS): Primary | ICD-10-CM

## 2021-02-17 PROCEDURE — 99212 OFFICE O/P EST SF 10 MIN: CPT | Performed by: SURGERY

## 2021-02-17 ASSESSMENT — PAIN SCALES - GENERAL: PAINLEVEL: NO PAIN (0)

## 2021-02-17 ASSESSMENT — MIFFLIN-ST. JEOR: SCORE: 2119.75

## 2021-02-17 NOTE — NURSING NOTE
"Chief Complaint   Patient presents with     Follow Up     Follow-up for Bariatric Surgery       Vitals:    02/17/21 0655   BP: 130/89   BP Location: Left arm   Patient Position: Chair   Cuff Size: Adult Large   Pulse: 97   SpO2: 98%   Weight: 147.4 kg (325 lb)   Height: 1.715 m (5' 7.5\")       Body mass index is 50.15 kg/m .                            "

## 2021-02-17 NOTE — LETTER
"2/17/2021       RE: Stephanie Sanches  [address] [id number]     Dear Colleague,    Thank you for referring your patient, Stephanie Sanches, to the [facility] [place] [facility]. Please see a copy of my visit note below.    This is an in person visit on a 55-year-old woman who is being prepared for laparoscopic sleeve gastrectomy.  She currently weighs 325 pounds with a goal of 316 pounds for surgery.  She tentatively is scheduled for April 5.  We again reviewed the procedure and its risks potential complications and alternatives. Past medical history significant for osteoarthritis, irritable bowel syndrome type 2 diabetes with dyslipidemia and nonalcoholic steatohepatitis.    Medications include Lipitor, Metformin, hydrochlorothiazide and an aspirin.    /89 (BP Location: Left arm, Patient Position: Chair, Cuff Size: Adult Large)   Pulse 97   Ht 1.715 m (5' 7.5\")   Wt 147.4 kg (325 lb)   SpO2 98%   BMI 50.15 kg/m       On examination her abdomen is soft she primarily has central obesity but her ribs are easily palpable.    Patient appears to be an acceptable candidate for laparoscopic sleeve gastrectomy possible hiatal hernia repair.  We talked about the postoperative diet and complications and reviewed any alternatives.  She will certainly need a weigh-in check in the meantime.    Again, thank you for allowing me to participate in the care of your patient.      Sincerely,    Martinmaria guadalupe Zambrano MD      "

## 2021-02-17 NOTE — PROGRESS NOTES
"This is an in person visit on a 55-year-old woman who is being prepared for laparoscopic sleeve gastrectomy.  She currently weighs 325 pounds with a goal of 316 pounds for surgery.  She tentatively is scheduled for April 5.  We again reviewed the procedure and its risks potential complications and alternatives. Past medical history significant for osteoarthritis, irritable bowel syndrome type 2 diabetes with dyslipidemia and nonalcoholic steatohepatitis.    Medications include Lipitor, Metformin, hydrochlorothiazide and an aspirin.    /89 (BP Location: Left arm, Patient Position: Chair, Cuff Size: Adult Large)   Pulse 97   Ht 1.715 m (5' 7.5\")   Wt 147.4 kg (325 lb)   SpO2 98%   BMI 50.15 kg/m       On examination her abdomen is soft she primarily has central obesity but her ribs are easily palpable.    Patient appears to be an acceptable candidate for laparoscopic sleeve gastrectomy possible hiatal hernia repair.  We talked about the postoperative diet and complications and reviewed any alternatives.  She will certainly need a weigh-in check in the meantime.  "

## 2021-02-18 ENCOUNTER — TELEPHONE (OUTPATIENT)
Dept: ENDOCRINOLOGY | Facility: CLINIC | Age: 56
End: 2021-02-18

## 2021-02-18 NOTE — TELEPHONE ENCOUNTER
Spoke with patient to see if she would like to participate in Dr. Zambrano's research study for fat analysis as regards insulin resistance. She states she would. I told her Precious Roa would be calling her to discuss further and answer any questions she may have.

## 2021-02-18 NOTE — TELEPHONE ENCOUNTER
Called patient as she had questions about doing some pre-op appointments closer to home, lives in [place], [place].   She would like to do the COVID-19 test at [facility] in [place], which is fine. This would have to be done on Thurs. April 1.     Discussed PAC appointment. Currently staff is reviewing the PAC appointments prior and I did tell her there was the possibility this could be a video visit vs her having to come down for in-person visit.     Discussed primary care letter of support. She states she has given our letter request to her primary care,  Dr. Allen, but will go to clinic again next week and ask her to complete this. I did give Stephanie my direct fax number.     Discussed going online to do the [email or web address] and afterwlsurgery which she will do this week.

## 2021-02-18 NOTE — TELEPHONE ENCOUNTER
Faxed lab order for COVID-19 test to [facility] to [phone]. This will need to be drawn on 4/1/21.   Called patient to let her know to call to schedule.  Also correct the www site for before and after weight loss surgery, s/b [email or web address] and [email or web address].

## 2021-02-19 NOTE — TELEPHONE ENCOUNTER
FUTURE VISIT INFORMATION      SURGERY INFORMATION:    Date: n/a    Location: n/a    Surgeon:  n/a    Anesthesia Type:  n/a    Procedure: n/a    Consult: 21    RECORDS REQUESTED FROM:       Primary Care Provider: Benson Allen     Most recent EKG+ Tracin.2.19 [facility]

## 2021-02-22 NOTE — PATIENT INSTRUCTIONS
"GOALS:  Relating To Eating:  Eat slowly (20-30 minutes per meal), chewing foods well (25 chews per bite/applesauce consistency)  9\" Plate method (1/2 plate non-starchy vegetables/fruit, 1/4 plate lean protein, 1/4 plate whole grain starch - no more than 1/2 cup carb/meal)    Relating to beverages:  Reduce caffeine/carbonation/calorie containing beverages  Separate fluids from meals by 30 minutes before, during, and after eating   4-8 oz milk or less per day     Initial Consult / Weight Loss     My Plate Planner_English - Pt Education  [email or web address]    Protein Sources for Weight Loss  [email or web address]     Carbohydrates  [email or web address]     Post-Bariatric Surgery Online Recipe Resources:  Online:    Simpler Recipes: https://www.[employer]/bariatric-surgery/recipes    Wonton cups and muffin tin portion sized recipes: https://www.Gaudena/blog/10-single-serving-meals-you-need-in-your-bariatric-life/    Some recipes on this site have larger than 1 cup portion size pictures: http://www.muschealth.org/weight-loss-surgery/nutrition/recipe-corner.html     https://www.Globel Direct.me/25-bariatric-friendly-weeknight-meals/    Crockpot recipes: https://www.Globel Direct.me/25-bariatric-friendly-crockpot-recipes/    https://MyMusic.TeleDNA/recipes/   Books:  Fresh Start Bariatric Cookbook by Lauren Kessler, MS, RDN, CD - $13 - Excellent cookbook with post-op recipes and many other resources to check out for ongoing support after surgery  Eating Well After Weight Loss Surgery by Ericka Devine and Jesus Alberto Colby - $10 - Great cookbook with recipes for each diet stage after surgery and recommended portion sizes. Slightly more intensive cooking recipes.  Bariatric Mindset Success by Irene Mitchell - $14.24 on Amazon - book that helps with prevention of weight regain after surgery. Helps train your brain for long term success with weight loss after surgery.    Tools:  Gm " Dish Sets: bariatric meal size bowls and plates with built in measurement designs on the dishes    Follow up with RD in one month    Lety Beal, MS, RD, LD  If you need to schedule or reschedule with a dietitian please call [phone].

## 2021-02-24 ENCOUNTER — CARE COORDINATION (OUTPATIENT)
Dept: ENDOCRINOLOGY | Facility: CLINIC | Age: 56
End: 2021-02-24

## 2021-02-24 NOTE — PROGRESS NOTES
Tasklist updated and sent to patient via Needle [employer].    Bariatric Task List  Status:  Is patient a candidate for bariatric surgery?:  patient is a candidate for bariatric surgery -     Cleared to schedule surgeon consult?:  cleared to schedule surgeon consult - 2/17/2021 appt with Dr Zambrano. s   Status:  surgery evaluation in process -     Surgeon: Juan Manuel  -     Tentative surgery month/year: 4/5/2021 - work with dietitian to reach goal weight beforehand. -        Insurance: Insurance:  [employer] -        Patient Info: Initial Weight:  336 -     Date of Initial Weight/Height:  2/1/2020 - [facility]    Goal Weight (lbs):  316 -     Required Weight Loss:  20 lb -     Surgery Type:  sleeve gastrectomy - with liver biopsy per Dr Zambrano. s      Dietician Visits: Structured weight loss required by insurance?:    -     Dietician Visit 1:  Completed - 12/2    Dietician Visit 2:  Completed - 1/6/2021 in Epic. bks   Dietician Visit 3:  Completed - 2/8/2021 in Epic. s   Dietician Visit 4:    -     Dietician Visit 5:    -     Dietician Visit 6:    -     Dietician Visit additional:  Needed - Continue to meet monthly to reach pre-surgery goal weight and for postop diet teaching. Call [phone] to schedule. s      Psychological Evaluation: Psych eval:  Completed - Letter sent to pt 12/3/ - AS; 12/28/20 Dr Hoover - cleared for surgery. s      Lab Work: Complete Blood Count:  Completed - Ordered 12/2 - AS; 12/10/20-done. bks   Comprehensive Metabolic Panel:  Completed - Ordered 12/2 - AS; 12/10/20 done. bks   Vitamin D:  Completed - 11/12/20-done. bks   Hgb A1c:  Completed -     PTH:  Completed - Ordered 12/2 - AS; 12/10/20-done. bks   TSH:  Completed - 12/10/20-done. bks   Other:  Completed - 2/3/2020. bks      Consults/ Clearance: Endocrine:  Completed - Letter sent to pt 12/2 - AS;ROSELINE Weathers at [facility]-(endocrinology) - 2/8/21 ltr via Right Fax, A1c at 6.1 to continue Metformin 1000mg BID until  "time of surgery and will likely not need it postop, to f/u 4-6 wks fter WLS   Medical Weight Management:   -     Other  Completed - Hepatology Letter sent to pt 12/2 - AS; 12/18/20 Venancio Mann PA-C-cleared in Care Everywhere note. bks          PCP: Establish care with PCP:  Completed -     Follow up with PCP:    -     PCP letter of support:  Completed - Letter sent to pt 12/2 - AS; 2/24/21 Received PCP ltr from Dr Allen dated 12/17/20 via Right Fax.      Patient Education:  Information Session:  Completed - Viewed 12/3/2020. bks   Given \"Making your decision\" handout?:  Given \"\"Making your decision\"\" handout? - 12/2 - AS   Given support group information?:  support group contact information provided - 12/2 - AS   Attended support group?:    -     Support plan in place?:    -        Additional Surgery Requirements: Other Requirements:  Call [phone] for scheduling. bks - Covid test to be done 4 days before surgery date.    Other Requirements:  Someone from the Contact Center will call you to schedule. If you do not hear from them within the next couple of days, you can call [phone]. bks - Postop appointments at 1 week and 31+ days.        Final Tasks:  Before surgery online class:  Completed - Information to read. ritika; Done 2/19/21. bks   Before surgery online class website link:  [email or web address]   After surgery online class:  Completed - Information to read. ritika; Done 2/19/21. bks   After surgery online class website link:  [email or web address]   Nurse visit for weigh-in and information:  Needed - RuthRN will contact you to set this up. marcellas   Pre-assessment clinic visit with anesthesia team for H&P:  Needed - Call [phone] to schedule this for 3 weeks before surgery. bks Appt 3/16/21.   Final labs (Hgb, plt, T&S, UA):    -  Done after the Pre-assessment clinic visit.      Notes: Please register for the Get Well Loop when you get an email invitation and a surgery " date.     The Get Well Loop will give you information via email or text messages that can help you be more successful before and after surgery.  It can also help answer any questions you may have.   -

## 2021-03-02 ENCOUNTER — PREP FOR PROCEDURE (OUTPATIENT)
Dept: ENDOCRINOLOGY | Facility: CLINIC | Age: 56
End: 2021-03-02

## 2021-03-03 ENCOUNTER — PREP FOR PROCEDURE (OUTPATIENT)
Dept: ENDOCRINOLOGY | Facility: CLINIC | Age: 56
End: 2021-03-03

## 2021-03-03 DIAGNOSIS — E66.01 MORBID OBESITY (H): Primary | ICD-10-CM

## 2021-03-03 RX ORDER — CLINDAMYCIN PHOSPHATE 900 MG/50ML
900 INJECTION, SOLUTION INTRAVENOUS
Status: CANCELLED | OUTPATIENT
Start: 2021-03-03

## 2021-03-03 RX ORDER — CLINDAMYCIN PHOSPHATE 900 MG/50ML
900 INJECTION, SOLUTION INTRAVENOUS SEE ADMIN INSTRUCTIONS
Status: CANCELLED | OUTPATIENT
Start: 2021-03-03

## 2021-03-04 PROBLEM — E66.01 MORBID OBESITY (H): Status: ACTIVE | Noted: 2020-12-02

## 2021-03-05 ENCOUNTER — DOCUMENTATION ONLY (OUTPATIENT)
Dept: CARE COORDINATION | Facility: CLINIC | Age: 56
End: 2021-03-05

## 2021-03-14 NOTE — PROGRESS NOTES
"Stephanie Sanches is a 55 year old female who is being evaluated via a billable video visit.      The patient has been notified of following:     \"This video visit will be conducted via a call between you and your physician/provider. We have found that certain health care needs can be provided without the need for an in-person physical exam.  This service lets us provide the care you need with a video conversation.  If a prescription is necessary we can send it directly to your pharmacy.  If lab work is needed we can place an order for that and you can then stop by our lab to have the test done at a later time.    Video visits are billed at different rates depending on your insurance coverage.  Please reach out to your insurance provider with any questions.    If during the course of the call the physician/provider feels a video visit is not appropriate, you will not be charged for this service.\"    Patient has given verbal consent for Video visit? Yes  How would you like to obtain your AVS? MyChart  If you are dropped from the video visit, the video invite should be resent to: Text to cell phone:  [phone]  Will anyone else be joining your video visit? No        Video-Visit Details    Type of service:  Video Visit    Video Start Time: 3:35 PM  Video End Time: 4:14 PM    Originating Location (pt. Location): Home    Distant Location (provider location):  [facility] [place]     Platform used for Video Visit: Qwbcg    During this virtual visit the patient is located in [place], patient verifies this as the location during the entirety of this visit.     Return Bariatric Nutrition Consultation Note    Reason For Visit: Nutrition Assessment    Stephanie Sanches is a 55 year old presenting today for follow-up bariatric nutrition consult.   Pt is interested in laparoscopic sleeve gastrectomy with Dr. Rosales expected surgery in March 2021.  Patient is accompanied by self.  This is pt's 4th " "of 3 required nutrition visits prior to surgery.     Pt referred by Farida Seymour NP on December 2, 2020.  Patient with Co-morbidities of obesity including:  Type II DM yes  Renal Failure no  Sleep apnea no  Hypertension no   Dyslipidemia no  Joint pain no  Back pain no  GERD no     Pt's history is also significant for NAM, followed by GI     Support System Reviewed With Patient 11/30/2020   Who do you have in your support network that can be available to help you for the first 2 weeks after surgery? Friends, boyfriend and family members   Who can you count on for support throughout your weight loss surgery journey? Friends, boyfriend and family members       ANTHROPOMETRICS:  Estimated body mass index is 50.15 kg/m  as calculated from the following:    Height as of 2/17/21: 1.715 m (5' 7.5\").    Weight as of 2/17/21: 147.4 kg (325 lb).   Current weight: 320 lb ( per pt's report)     Required weight loss goal pre-op: 20 lbs from initial consult weight (goal weight 316 lbs or less before surgery)       11/30/2020   I have tried the following methods to lose weight Watching portions or calories, Exercise, Slimfast, Prescription Medications, Physician directed program       Weight Loss Questions Reviewed With Patient 11/30/2020   How long have you been overweight? Since early childhood       SUPPLEMENT INFORMATION:  Calcium carbonate 600 mg BID  Vitamin 50,000 international unit(s) biweekly   Probiotic  Children chewable MVI: equate.     NUTRITION HISTORY:  Having a premier protein shake for breakfast. Has stopped drinking coffee over the past month, only drinking water and proteinO2    Progress Towards previous goals:  Relating To Eating:  Eat slowly (20-30 minutes per meal), chewing foods well (25 chews per bite/applesauce consistency)-Met  9\" Plate method (1/2 plate non-starchy vegetables/fruit, 1/4 plate lean protein, 1/4 plate whole grain starch - no more than 1/2 cup carb/meal)-Met    Relating to " beverages:  Reduce caffeine/carbonation/calorie containing beverages-Met  Separate fluids from meals by 30 minutes before, during, and after eating -Met  4-8 oz milk or less per day -Met    ADDITIONAL INFORMATION:    Dining Out History Reviewed With Patient 11/30/2020   How often do you dine out? Around once a week.   Where do you dine out? (select all that apply) fast food chains, take out   What types of food do you order when you dine out? Ino Nunes, Ashley De Leon       Physical Activity Reviewed With Patient 11/30/2020   How often do you exercise? 3 to 4 times per week   What is the duration of your exercise (in minutes)? 15 Minutes   What types of exercise do you do? walking, other   What keeps you from being more active?  I am as active as I can possbily be, Pain     Exercise: Sit scyle 3x a week. Slowly arm exercise with weights, or soup cans from the PT.     NUTRITION DIAGNOSIS:  Obesity r/t long history of self-monitoring deficit and excessive energy intake aeb BMI >30 kg/m2.-Improving  And  Food- and Nutrition-related knowledge deficit r/t lack of prior exposure to information AEB pt unable to verbalize main points of bariatric clear and low-fat full liquid diet.    INTERVENTION:  Intervention Provided/Education Provided/Reviewed previous goals and encouraged patient to continue goals prior to surgery.   -Discussed modified liquid diet     Provided instruction on bariatric clear and low-fat full liquid diets.  Provided the following handouts: Diet Guidelines for Bariatric Surgery, Sources of Protein, bariatric clear and low-fat full liquid diets.     Questions Reviewed With Patient 11/30/2020   How ready are you to make changes regarding your weight? Number 1 = Not ready at all to make changes up to 10 = very ready. 10   How confident are you that you can change? 1 = Not confident that you will be successful making changes up to 10 = very confident. 10       Expected Engagement:  "good    GOALS:  Relating To Eating:  Eat slowly (20-30 minutes per meal), chewing foods well (25 chews per bite/applesauce consistency)  9\" Plate method (1/2 plate non-starchy vegetables/fruit, 1/4 plate lean protein, 1/4 plate whole grain starch - no more than 1/2 cup carb/meal)     Follow the Modified Liquid Diet for weight loss:  Breakfast: Protein Shake  Lunch: Protein Shake  Supper: 3 oz lean protein + non-starchy vegetables  Snack: non-starchy vegetables (no calorie-containing dips/condiments)  Beverages: at least 48-64 oz water between meals daily    *Protein Shake Criteria: no more than 210 Calories, at least 20 grams of protein, and less than 10 grams of sugar     Meal Replacement Shake Options:    Health VLC smoothie (160 Calories, 20 g protein)   Premier Protein (160 Calories, 30 g protein)  Slim Fast Advanced Nutrition (180 Calories, 20 g protein)  Muscle Milk, lactose-free, 17 oz bottle (210 Calories, 30 g protein)  Integrated Supplements, no artificial sugars (110 Calories, 20 g protein)  Genepro, unflavored protein powder (60 Calories, 30 g protein)    Relating to beverages:  Reduce caffeine/carbonation/calorie containing beverages  Separate fluids from meals by 30 minutes before, during, and after eating   4-8 oz milk or less per day      - Options for calcium citrate: Steve calcium citrate chewable, bariatric advantage calcium citrate chewable, Celebrate vitamins calcium citrate chewable, Bariatric Fusion calcium citrate chewable    Goals after surgery:   1. Follow the bariatric post-op diet advancement schedule:  - Bariatric clear liquid diet the day before surgery and 1 week post-op.(4/4/2021-4/11/2021)  - Bariatric low-fat full liquid diet on post-op days 7 through 13 (1 week).  2. Sip on 48-64 oz (or greater) fluids daily, recording intake to help stay on-track.  - Drink at least 2 oz of fluid every 30 min.  3. Stop multivitamin at surgery. We will discuss starting a chewable multivitamin at the " one week post-op visit.      Weight Loss Surgery Post-Op Diet Progression    Diet Guidelines after Weight-loss Surgery  [email or web address]     Checklist for Stages of Your Diet after Weight-loss Surgery  [email or web address]     Portion Sizes after Weight Loss Surgery  [email or web address]    Your Stage 1 Diet: Clear Liquids (4/4/2021-4/11/2021)  [email or web address]     Your Stage 2 Diet: Low-fat Full Liquids (4/12/2021-4/18)  [email or web address]       Post-Bariatric Surgery Online Recipe Resources:  Online:    Simpler Recipes: https://www.Health Informatics/bariatric-surgery/recipes    Wonton cups and muffin tin portion sized recipes: https://www.AG&P/blog/10-single-serving-meals-you-need-in-your-bariatric-life/    Some recipes on this site have larger than 1 cup portion size pictures: http://www.[facility].org/weight-loss-surgery/nutrition/recipe-corner.html     https://www.Happy Cosas.me/25-bariatric-friendly-weeknight-meals/    Crockpot recipes: https://www.Happy Cosas.me/25-bariatric-friendly-crockpot-recipes/    https://Seniorlink.EvalYou/recipes/   Books:  Fresh Start Bariatric Cookbook by Lauren Kessler MS, RDN, CD - $13 - Excellent cookbook with post-op recipes and many other resources to check out for ongoing support after surgery  Eating Well After Weight Loss Surgery by Ericka Devine and Jesus Alberto Colby - $10 - Great cookbook with recipes for each diet stage after surgery and recommended portion sizes. Slightly more intensive cooking recipes.  Bariatric Mindset Success by Irene Mitchell - $14.24 on Amazon - book that helps with prevention of weight regain after surgery. Helps train your brain for long term success with weight loss after surgery.    Tools:  Uppidy Dish Sets: bariatric meal size bowls and plates with built in measurement designs on the dishes    Time spent with patient: 39 minutes.  Lety Beal, MS, RD, LD

## 2021-03-15 ENCOUNTER — VIRTUAL VISIT (OUTPATIENT)
Dept: ENDOCRINOLOGY | Facility: CLINIC | Age: 56
End: 2021-03-15
Payer: COMMERCIAL

## 2021-03-15 DIAGNOSIS — E66.01 CLASS 3 SEVERE OBESITY DUE TO EXCESS CALORIES WITH SERIOUS COMORBIDITY AND BODY MASS INDEX (BMI) OF 50.0 TO 59.9 IN ADULT (H): Primary | ICD-10-CM

## 2021-03-15 DIAGNOSIS — E66.813 CLASS 3 SEVERE OBESITY DUE TO EXCESS CALORIES WITH SERIOUS COMORBIDITY AND BODY MASS INDEX (BMI) OF 50.0 TO 59.9 IN ADULT (H): Primary | ICD-10-CM

## 2021-03-15 DIAGNOSIS — Z71.3 NUTRITIONAL COUNSELING: ICD-10-CM

## 2021-03-15 DIAGNOSIS — E11.8 DIABETES MELLITUS TYPE 2 WITH COMPLICATIONS (H): ICD-10-CM

## 2021-03-15 PROCEDURE — 97803 MED NUTRITION INDIV SUBSEQ: CPT | Mod: 95 | Performed by: DIETITIAN, REGISTERED

## 2021-03-15 NOTE — LETTER
"3/15/2021       RE: Stephanie Sanches  [address] [address]     Dear Colleague,    Thank you for referring your patient, Stephanie Sanches, to the [facility] [place] at [facility]. Please see a copy of my visit note below.    Stephanie Sanches is a 55 year old female who is being evaluated via a billable video visit.      The patient has been notified of following:     \"This video visit will be conducted via a call between you and your physician/provider. We have found that certain health care needs can be provided without the need for an in-person physical exam.  This service lets us provide the care you need with a video conversation.  If a prescription is necessary we can send it directly to your pharmacy.  If lab work is needed we can place an order for that and you can then stop by our lab to have the test done at a later time.    Video visits are billed at different rates depending on your insurance coverage.  Please reach out to your insurance provider with any questions.    If during the course of the call the physician/provider feels a video visit is not appropriate, you will not be charged for this service.\"    Patient has given verbal consent for Video visit? Yes  How would you like to obtain your AVS? MyChart  If you are dropped from the video visit, the video invite should be resent to: Text to cell phone:  [phone]  Will anyone else be joining your video visit? No        Video-Visit Details    Type of service:  Video Visit    Video Start Time: 3:35 PM  Video End Time: 4:14 PM    Originating Location (pt. Location): Home    Distant Location (provider location):  [facility] [place]     Platform used for Video Visit: 7Road    During this virtual visit the patient is located in [place], patient verifies this as the location during the entirety of this visit.     Return " "Bariatric Nutrition Consultation Note    Reason For Visit: Nutrition Assessment    Stephanie Sanches is a 55 year old presenting today for follow-up bariatric nutrition consult.   Pt is interested in laparoscopic sleeve gastrectomy with Dr. Rosales expected surgery in March 2021.  Patient is accompanied by self.  This is pt's 4th of 3 required nutrition visits prior to surgery.     Pt referred by Farida Seymour NP on December 2, 2020.  Patient with Co-morbidities of obesity including:  Type II DM yes  Renal Failure no  Sleep apnea no  Hypertension no   Dyslipidemia no  Joint pain no  Back pain no  GERD no     Pt's history is also significant for NAM, followed by GI     Support System Reviewed With Patient 11/30/2020   Who do you have in your support network that can be available to help you for the first 2 weeks after surgery? Friends, boyfriend and family members   Who can you count on for support throughout your weight loss surgery journey? Friends, boyfriend and family members       ANTHROPOMETRICS:  Estimated body mass index is 50.15 kg/m  as calculated from the following:    Height as of 2/17/21: 1.715 m (5' 7.5\").    Weight as of 2/17/21: 147.4 kg (325 lb).   Current weight: 320 lb ( per pt's report)     Required weight loss goal pre-op: 20 lbs from initial consult weight (goal weight 316 lbs or less before surgery)       11/30/2020   I have tried the following methods to lose weight Watching portions or calories, Exercise, Slimfast, Prescription Medications, Physician directed program       Weight Loss Questions Reviewed With Patient 11/30/2020   How long have you been overweight? Since early childhood       SUPPLEMENT INFORMATION:  Calcium carbonate 600 mg BID  Vitamin 50,000 international unit(s) biweekly   Probiotic  Children chewable MVI: equate.     NUTRITION HISTORY:  Having a premier protein shake for breakfast. Has stopped drinking coffee over the past month, only drinking water and proteinO2    Progress " "Towards previous goals:  Relating To Eating:  Eat slowly (20-30 minutes per meal), chewing foods well (25 chews per bite/applesauce consistency)-Met  9\" Plate method (1/2 plate non-starchy vegetables/fruit, 1/4 plate lean protein, 1/4 plate whole grain starch - no more than 1/2 cup carb/meal)-Met    Relating to beverages:  Reduce caffeine/carbonation/calorie containing beverages-Met  Separate fluids from meals by 30 minutes before, during, and after eating -Met  4-8 oz milk or less per day -Met    ADDITIONAL INFORMATION:    Dining Out History Reviewed With Patient 11/30/2020   How often do you dine out? Around once a week.   Where do you dine out? (select all that apply) fast food chains, take out   What types of food do you order when you dine out? Burgers, Pizza, Subway, [employer]       Physical Activity Reviewed With Patient 11/30/2020   How often do you exercise? 3 to 4 times per week   What is the duration of your exercise (in minutes)? 15 Minutes   What types of exercise do you do? walking, other   What keeps you from being more active?  I am as active as I can possbily be, Pain     Exercise: Sit scyle 3x a week. Slowly arm exercise with weights, or soup cans from the PT.     NUTRITION DIAGNOSIS:  Obesity r/t long history of self-monitoring deficit and excessive energy intake aeb BMI >30 kg/m2.-Improving  And  Food- and Nutrition-related knowledge deficit r/t lack of prior exposure to information AEB pt unable to verbalize main points of bariatric clear and low-fat full liquid diet.    INTERVENTION:  Intervention Provided/Education Provided/Reviewed previous goals and encouraged patient to continue goals prior to surgery.   -Discussed modified liquid diet     Provided instruction on bariatric clear and low-fat full liquid diets.  Provided the following handouts: Diet Guidelines for Bariatric Surgery, Sources of Protein, bariatric clear and low-fat full liquid diets.     Questions Reviewed With Patient 11/30/2020 " "  How ready are you to make changes regarding your weight? Number 1 = Not ready at all to make changes up to 10 = very ready. 10   How confident are you that you can change? 1 = Not confident that you will be successful making changes up to 10 = very confident. 10       Expected Engagement: good    GOALS:  Relating To Eating:  Eat slowly (20-30 minutes per meal), chewing foods well (25 chews per bite/applesauce consistency)  9\" Plate method (1/2 plate non-starchy vegetables/fruit, 1/4 plate lean protein, 1/4 plate whole grain starch - no more than 1/2 cup carb/meal)     Follow the Modified Liquid Diet for weight loss:  Breakfast: Protein Shake  Lunch: Protein Shake  Supper: 3 oz lean protein + non-starchy vegetables  Snack: non-starchy vegetables (no calorie-containing dips/condiments)  Beverages: at least 48-64 oz water between meals daily    *Protein Shake Criteria: no more than 210 Calories, at least 20 grams of protein, and less than 10 grams of sugar     Meal Replacement Shake Options:    Projectioneering [facility] smoothie (160 Calories, 20 g protein)   Premier Protein (160 Calories, 30 g protein)  Slim Fast Advanced Nutrition (180 Calories, 20 g protein)  Muscle Milk, lactose-free, 17 oz bottle (210 Calories, 30 g protein)  Integrated Supplements, no artificial sugars (110 Calories, 20 g protein)  Genepro, unflavored protein powder (60 Calories, 30 g protein)    Relating to beverages:  Reduce caffeine/carbonation/calorie containing beverages  Separate fluids from meals by 30 minutes before, during, and after eating   4-8 oz milk or less per day      - Options for calcium citrate: Steve calcium citrate chewable, bariatric advantage calcium citrate chewable, Celebrate vitamins calcium citrate chewable, Bariatric Fusion calcium citrate chewable    Goals after surgery:   1. Follow the bariatric post-op diet advancement schedule:  - Bariatric clear liquid diet the day before surgery and 1 week post-op.(4/4/2021-4/11/2021)  - " Bariatric low-fat full liquid diet on post-op days 7 through 13 (1 week).  2. Sip on 48-64 oz (or greater) fluids daily, recording intake to help stay on-track.  - Drink at least 2 oz of fluid every 30 min.  3. Stop multivitamin at surgery. We will discuss starting a chewable multivitamin at the one week post-op visit.      Weight Loss Surgery Post-Op Diet Progression    Diet Guidelines after Weight-loss Surgery  [email or web address]     Checklist for Stages of Your Diet after Weight-loss Surgery  [email or web address]     Portion Sizes after Weight Loss Surgery  [email or web address]    Your Stage 1 Diet: Clear Liquids (4/4/2021-4/11/2021)  [email or web address]     Your Stage 2 Diet: Low-fat Full Liquids (4/12/2021-4/18)  [email or web address]       Post-Bariatric Surgery Online Recipe Resources:  Online:    Simpler Recipes: https://www.Selenokhod/bariatric-surgery/recipes    Wonton cups and muffin tin portion sized recipes: https://www.Wave Systems/blog/10-single-serving-meals-you-need-in-your-bariatric-life/    Some recipes on this site have larger than 1 cup portion size pictures: http://www.[facility].org/weight-loss-surgery/nutrition/recipe-corner.html     https://www.foodcoach.me/25-bariatric-friendly-weeknight-meals/    Crockpot recipes: https://www.foodTelespree.me/25-bariatric-friendly-crockpot-recipes/    https://Zeppelin.Miria Systems/recipes/   Books:  Fresh Start Bariatric Cookbook by Lauren Kessler MS, RDN, CD - $13 - Excellent cookbook with post-op recipes and many other resources to check out for ongoing support after surgery  Eating Well After Weight Loss Surgery by Ericka Devine and Jesus Alberto Colby - $10 - Great cookbook with recipes for each diet stage after surgery and recommended portion sizes. Slightly more intensive cooking recipes.  Bariatric Mindset Success by Irene Mitchell - $14.24 on Amazon - book that helps with prevention of weight  regain after surgery. Helps train your brain for long term success with weight loss after surgery.    Tools:  RealityMine Dish Sets: bariatric meal size bowls and plates with built in measurement designs on the dishes    Time spent with patient: 39 minutes.  Lety Beal, MS, RD, LD

## 2021-03-16 ENCOUNTER — OFFICE VISIT (OUTPATIENT)
Dept: SURGERY | Facility: CLINIC | Age: 56
End: 2021-03-16
Payer: COMMERCIAL

## 2021-03-16 ENCOUNTER — ANESTHESIA EVENT (OUTPATIENT)
Dept: SURGERY | Facility: CLINIC | Age: 56
End: 2021-03-16

## 2021-03-16 ENCOUNTER — PRE VISIT (OUTPATIENT)
Dept: SURGERY | Facility: CLINIC | Age: 56
End: 2021-03-16

## 2021-03-16 VITALS
BODY MASS INDEX: 44.41 KG/M2 | HEIGHT: 68 IN | DIASTOLIC BLOOD PRESSURE: 85 MMHG | HEART RATE: 89 BPM | SYSTOLIC BLOOD PRESSURE: 111 MMHG | WEIGHT: 293 LBS | OXYGEN SATURATION: 100 % | RESPIRATION RATE: 16 BRPM | TEMPERATURE: 97.9 F

## 2021-03-16 DIAGNOSIS — E66.01 CLASS 3 SEVERE OBESITY DUE TO EXCESS CALORIES WITH SERIOUS COMORBIDITY AND BODY MASS INDEX (BMI) OF 50.0 TO 59.9 IN ADULT (H): ICD-10-CM

## 2021-03-16 DIAGNOSIS — Z01.818 PRE-OP EVALUATION: ICD-10-CM

## 2021-03-16 DIAGNOSIS — E66.813 CLASS 3 SEVERE OBESITY DUE TO EXCESS CALORIES WITH SERIOUS COMORBIDITY AND BODY MASS INDEX (BMI) OF 50.0 TO 59.9 IN ADULT (H): ICD-10-CM

## 2021-03-16 DIAGNOSIS — Z01.818 PRE-OP EVALUATION: Primary | ICD-10-CM

## 2021-03-16 DIAGNOSIS — K75.81 NASH (NONALCOHOLIC STEATOHEPATITIS): ICD-10-CM

## 2021-03-16 DIAGNOSIS — E66.01 MORBID OBESITY (H): ICD-10-CM

## 2021-03-16 LAB
ALBUMIN SERPL-MCNC: 3.9 G/DL (ref 3.4–5)
ALBUMIN UR-MCNC: NEGATIVE MG/DL
ALP SERPL-CCNC: 69 U/L (ref 40–150)
ALT SERPL W P-5'-P-CCNC: 42 U/L (ref 0–50)
ANION GAP SERPL CALCULATED.3IONS-SCNC: 4 MMOL/L (ref 3–14)
APPEARANCE UR: CLEAR
AST SERPL W P-5'-P-CCNC: 22 U/L (ref 0–45)
BILIRUB SERPL-MCNC: 0.7 MG/DL (ref 0.2–1.3)
BILIRUB UR QL STRIP: NEGATIVE
BUN SERPL-MCNC: 20 MG/DL (ref 7–30)
CALCIUM SERPL-MCNC: 9.5 MG/DL (ref 8.5–10.1)
CHLORIDE SERPL-SCNC: 107 MMOL/L (ref 94–109)
CO2 SERPL-SCNC: 27 MMOL/L (ref 20–32)
COLOR UR AUTO: YELLOW
CREAT SERPL-MCNC: 0.86 MG/DL (ref 0.52–1.04)
ERYTHROCYTE [DISTWIDTH] IN BLOOD BY AUTOMATED COUNT: 13.6 % (ref 10–15)
GFR SERPL CREATININE-BSD FRML MDRD: 77 ML/MIN/{1.73_M2}
GLUCOSE SERPL-MCNC: 108 MG/DL (ref 70–99)
GLUCOSE UR STRIP-MCNC: NEGATIVE MG/DL
HCT VFR BLD AUTO: 46 % (ref 35–47)
HGB BLD-MCNC: 14.8 G/DL (ref 11.7–15.7)
HGB UR QL STRIP: NEGATIVE
KETONES UR STRIP-MCNC: 5 MG/DL
LEUKOCYTE ESTERASE UR QL STRIP: NEGATIVE
MCH RBC QN AUTO: 27.4 PG (ref 26.5–33)
MCHC RBC AUTO-ENTMCNC: 32.2 G/DL (ref 31.5–36.5)
MCV RBC AUTO: 85 FL (ref 78–100)
NITRATE UR QL: NEGATIVE
PH UR STRIP: 5 PH (ref 5–7)
PLATELET # BLD AUTO: 283 10E9/L (ref 150–450)
POTASSIUM SERPL-SCNC: 4 MMOL/L (ref 3.4–5.3)
PROT SERPL-MCNC: 8.4 G/DL (ref 6.8–8.8)
PTH-INTACT SERPL-MCNC: 34 PG/ML (ref 18–80)
RBC # BLD AUTO: 5.4 10E12/L (ref 3.8–5.2)
SODIUM SERPL-SCNC: 138 MMOL/L (ref 133–144)
SOURCE: ABNORMAL
SP GR UR STRIP: 1.02 (ref 1–1.03)
TSH SERPL DL<=0.005 MIU/L-ACNC: 2.56 MU/L (ref 0.4–4)
UROBILINOGEN UR STRIP-MCNC: 0 MG/DL (ref 0–2)
WBC # BLD AUTO: 10.3 10E9/L (ref 4–11)

## 2021-03-16 PROCEDURE — 83970 ASSAY OF PARATHORMONE: CPT | Mod: 90 | Performed by: PATHOLOGY

## 2021-03-16 PROCEDURE — 84443 ASSAY THYROID STIM HORMONE: CPT | Performed by: PATHOLOGY

## 2021-03-16 PROCEDURE — 85027 COMPLETE CBC AUTOMATED: CPT | Performed by: PATHOLOGY

## 2021-03-16 PROCEDURE — 36415 COLL VENOUS BLD VENIPUNCTURE: CPT | Performed by: PATHOLOGY

## 2021-03-16 PROCEDURE — 81003 URINALYSIS AUTO W/O SCOPE: CPT | Performed by: PATHOLOGY

## 2021-03-16 PROCEDURE — 80053 COMPREHEN METABOLIC PANEL: CPT | Performed by: PATHOLOGY

## 2021-03-16 PROCEDURE — 99203 OFFICE O/P NEW LOW 30 MIN: CPT | Performed by: PHYSICIAN ASSISTANT

## 2021-03-16 RX ORDER — MULTIPLE VITAMINS W/ MINERALS TAB 9MG-400MCG
1 TAB ORAL 2 TIMES DAILY
COMMUNITY

## 2021-03-16 RX ORDER — VIT E ACET/GLY/DIMETH/WATER
LOTION (ML) TOPICAL PRN
COMMUNITY

## 2021-03-16 SDOH — HEALTH STABILITY: MENTAL HEALTH: HOW MANY STANDARD DRINKS CONTAINING ALCOHOL DO YOU HAVE ON A TYPICAL DAY?: NOT ASKED

## 2021-03-16 SDOH — HEALTH STABILITY: MENTAL HEALTH: HOW OFTEN DO YOU HAVE 6 OR MORE DRINKS ON ONE OCCASION?: NOT ASKED

## 2021-03-16 SDOH — HEALTH STABILITY: MENTAL HEALTH: HOW OFTEN DO YOU HAVE A DRINK CONTAINING ALCOHOL?: NOT ASKED

## 2021-03-16 ASSESSMENT — LIFESTYLE VARIABLES: TOBACCO_USE: 0

## 2021-03-16 ASSESSMENT — PAIN SCALES - GENERAL: PAINLEVEL: NO PAIN (0)

## 2021-03-16 ASSESSMENT — MIFFLIN-ST. JEOR: SCORE: 2097.07

## 2021-03-16 NOTE — PATIENT INSTRUCTIONS
Preparing for Your Surgery      Name:  Stephanie Sanches   MRN:  [id number]   :  1965   Today's Date:  3/16/2021       Arriving for surgery:  Surgery date:  2021  Arrival time:  5:30 am    Restrictions due to COVID 19:  One consistent visitor per patient is allowed.  The visitor will be allowed in the pre-op area.  Visitors are asked to leave the building during the surgery.  No ill visitors.  All visitors must wear face mask.    [email or web address] parking is available for anyone with mobility limitations or disabilities.  ([place]  24 hours/ 7 days a week; [facility]  7 am- 3:30 pm, Mon- Fri)    Please come to:     [facility] [address]  [address] [place]  [address]       -    Please proceed to [address] on the 3rd floor. [phone]?     - ?If you are in need of directions, wheelchair or escort please stop at the Information Desk in the lobby.      What can I eat or drink?  -  Follow diet restrictions as directed by surgeon   -  You may have clear liquids until 2 hours before surgery. (Until 5:30 am arrival time)    Examples of clear liquids:  Water  Clear broth  Juices (apple, white grape, white cranberry  and cider) without pulp  Noncarbonated, powder based beverages  (lemonade and Adalberto-Aid)  Sodas (Sprite, 7-Up, ginger ale and seltzer)  Coffee or tea (without milk or cream)  Gatorade    -  No Alcohol for at least 24 hours before surgery     Which medicines can I take?    Hold Aspirin for 7 days before surgery.     Hold Multivitamins for 7 days before surgery.    Hold Supplements for 7 days before surgery.  Hold Ibuprofen (Advil, Motrin) for 1 day before surgery--unless otherwise directed by surgeon.  Hold Naproxen (Aleve) for 4 days before surgery.    -  DO NOT take these medications the day of surgery:  Calcium   Hydrochlorothiazide   Metformin   Probiotic       -  PLEASE TAKE these medications the day of surgery:  NONE      How do I prepare  myself?  - Please take 2 showers before surgery using Scrubcare or Hibiclens soap.    Use this soap only from the neck to your toes.     Leave the soap on your skin for one minute--then rinse thoroughly.      You may use your own shampoo and conditioner; no other hair products.   - Please remove all jewelry and body piercings.  - No lotions, deodorants or fragrance.  - No makeup or fingernail polish.   - Bring your ID and insurance card.    - All patients are required to have a Covid-19 test within 4 days of surgery/procedure.      -Patients will be contacted by the [facility] scheduling team within 1 week of surgery to make an appointment.      - Patients may call the Scheduling team at [phone] if they have not been scheduled within 4 days of  surgery.        Questions or Concerns:    - For any questions regarding the day of surgery or your hospital stay, please contact the Pre Admission Nursing Office at [phone].       - If you have health changes between today and your surgery please call your surgeon.       For questions after surgery please call your surgeons office.

## 2021-03-16 NOTE — PATIENT INSTRUCTIONS
"GOALS:  Relating To Eating:  Eat slowly (20-30 minutes per meal), chewing foods well (25 chews per bite/applesauce consistency)  9\" Plate method (1/2 plate non-starchy vegetables/fruit, 1/4 plate lean protein, 1/4 plate whole grain starch - no more than 1/2 cup carb/meal)    Relating to beverages:  Reduce caffeine/carbonation/calorie containing beverages  Separate fluids from meals by 30 minutes before, during, and after eating   4-8 oz milk or less per day      - Options for calcium citrate: Steve calcium citrate chewable, bariatric advantage calcium citrate chewable, Celebrate vitamins calcium citrate chewable, Bariatric Fusion calcium citrate chewable    Goals after surgery:   1. Follow the bariatric post-op diet advancement schedule:  - Bariatric clear liquid diet the day before surgery and 1 week post-op.(4/4/2021-4/11/2021)  - Bariatric low-fat full liquid diet on post-op days 7 through 13 (1 week).  2. Sip on 48-64 oz (or greater) fluids daily, recording intake to help stay on-track.  - Drink at least 2 oz of fluid every 30 min.  3. Stop multivitamin at surgery. We will discuss starting a chewable multivitamin at the one week post-op visit.      Weight Loss Surgery Post-Op Diet Progression    Diet Guidelines after Weight-loss Surgery  [email or web address]     Checklist for Stages of Your Diet after Weight-loss Surgery  [email or web address]     Portion Sizes after Weight Loss Surgery  [email or web address]    Your Stage 1 Diet: Clear Liquids (4/4/2021-4/11/2021)  [email or web address]     Your Stage 2 Diet: Low-fat Full Liquids (4/12/2021-4/18)  [email or web address]       Post-Bariatric Surgery Online Recipe Resources:  Online:    Simpler Recipes: https://www.[employer]Switch/bariatric-surgery/recipes    Wonton cups and muffin tin portion sized recipes: https://www.[employer]/blog/10-single-serving-meals-you-need-in-your-bariatric-life/    Some recipes on this site " have larger than 1 cup portion size pictures: http://www.[facility].org/weight-loss-surgery/nutrition/recipe-corner.html     https://www.Spotlight.me/25-bariatric-friendly-weeknight-meals/    Crockpot recipes: https://www.Spotlight.me/25-bariatric-friendly-crockpot-recipes/    https://SEA/recipes/   Books:  Fresh Start Bariatric Cookbook by Lauren Kessler, MS, RDN, CD - $13 - Excellent cookbook with post-op recipes and many other resources to check out for ongoing support after surgery  Eating Well After Weight Loss Surgery by Ericka Devine and Jesus Alberto Colby - $10 - Great cookbook with recipes for each diet stage after surgery and recommended portion sizes. Slightly more intensive cooking recipes.  Bariatric Mindset Success by Irene Mitchell - $14.24 on Amazon - book that helps with prevention of weight regain after surgery. Helps train your brain for long term success with weight loss after surgery.    Tools:  [employer] Dish Sets: bariatric meal size bowls and plates with built in measurement designs on the dishes

## 2021-03-16 NOTE — H&P
Pre-Operative H & P     CC:  Preoperative exam to assess for increased cardiopulmonary risk while undergoing surgery and anesthesia.    Date of Encounter: 3/16/2021  Primary Care Physician:  Benson Allen  Associated diagnosis: obesity    HPI  Stephanie Sanches is a 55 year old female who presents for pre-operative H & P in preparation for sleeve gastrectomy, hiatal hernia repair and liver biopsy with Dr. Zambrano on 4/5/21 at [facility]. Patient is being evaluated for comorbid conditions of dyslipidemia, diabetes, IBS, NAM    Ms. Sanches has a history of morbid obesity. She has been following with the bariatric team in preparation for a future weight loss surgery. She is currently using Topamax. She is now scheduled for the above procedure.    History is obtained from the patient and chart review.      Past Medical History  Past Medical History:   Diagnosis Date     Diabetes mellitus (H)      Dyslipidemia      Irritable bowel syndrome      NAM (nonalcoholic steatohepatitis)        Past Surgical History  Past Surgical History:   Procedure Laterality Date     ANKLE SURGERY      reconstruction     ARTHROSCOPY KNEE       CHOLECYSTECTOMY  2008     HYSTERECTOMY       NOSE SURGERY       TUBAL LIGATION         Hx of Blood transfusions/reactions: denies     Hx of abnormal bleeding or anti-platelet use: ASA 81    Menstrual history: No LMP recorded. Patient has had a hysterectomy.    Steroid use in the last year: denies    Personal or FH with difficulty with Anesthesia:  Patient has a history of PONV, but has no family history of anesthesia complications.      Prior to Admission Medications  Current Outpatient Medications   Medication Sig Dispense Refill     atorvastatin (LIPITOR) 10 MG tablet Take 10 mg by mouth At Bedtime        calcium carbonate (CALCIUM CARBONATE) 600 MG tablet 2 times daily (with meals)        Cetaphil Moisturizing (CETAPHIL) external lotion Apply  topically as needed       ergocalciferol (ERGOCALCIFEROL) 1.25 MG (32691 UT) capsule 50,000 Units every 14 days        fluocinonide (LIDEX) 0.05 % external ointment Apply topically as needed        hydrochlorothiazide (HYDRODIURIL) 25 MG tablet as needed In the summer time only PRN       metFORMIN (GLUCOPHAGE) 1000 MG tablet Take 1,000 mg by mouth 2 times daily (with meals)        multivitamin w/minerals (MULTI-VITAMIN) tablet Take 1 tablet by mouth 2 times daily       Probiotic Product (PROBIOTIC-10 PO) every morning        topiramate (TOPAMAX) 25 MG tablet Take 50 mg by mouth every evening        ASPIRIN 81 PO 81 mg every morning        Continuous Blood Gluc  (FREESTYLE CARLOS 14 DAY READER) CINDI USE AS DIRECTED       Continuous Blood Gluc Sensor (FREESTYLE CARLOS 14 DAY SENSOR) MISC 1 Device       Continuous Blood Gluc Sensor (FREESTYLE CARLOS 14 DAY SENSOR) MISC APPLY 1 DEVICE UTD EVERY FOURTEEN DAYS       Continuous Blood Gluc Sensor (FREESTYLE CARLOS 14 DAY SENSOR) MISC          Allergies  Allergies   Allergen Reactions     Cefaclor Hives     Cephalexin Hives     Dust Mite Extract Other (See Comments)     Triamcinolone Other (See Comments) and Unknown     Injection redness over body       Cortisone Rash     Reaction to cortisone shot in knee         Social History  Social History     Socioeconomic History     Marital status: Single     Spouse name: Not on file     Number of children: Not on file     Years of education: Not on file     Highest education level: Not on file   Occupational History     Not on file   Social Needs     Financial resource strain: Not on file     Food insecurity     Worry: Not on file     Inability: Not on file     Transportation needs     Medical: Not on file     Non-medical: Not on file   Tobacco Use     Smoking status: Never Smoker     Smokeless tobacco: Never Used   Substance and Sexual Activity     Alcohol use: Not Currently     Drug use: Not Currently     Sexual activity: Not  on file   Lifestyle     Physical activity     Days per week: Not on file     Minutes per session: Not on file     Stress: Not on file   Relationships     Social connections     Talks on phone: Not on file     Gets together: Not on file     Attends Scientologist service: Not on file     Active member of club or organization: Not on file     Attends meetings of clubs or organizations: Not on file     Relationship status: Not on file     Intimate partner violence     Fear of current or ex partner: Not on file     Emotionally abused: Not on file     Physically abused: Not on file     Forced sexual activity: Not on file   Other Topics Concern     Not on file   Social History Narrative     Not on file       Family History  Family History   Problem Relation Age of Onset     Anesthesia Reaction No family hx of      Deep Vein Thrombosis (DVT) No family hx of          ROS/MED HX  ENT/Pulmonary:  - neg pulmonary ROS  (-) tobacco use   Neurologic:  - neg neurologic ROS     Cardiovascular:     (+) Dyslipidemia hypertension-----Taking blood thinners Previous cardiac testing   Echo: Date: Results:    Stress Test: Date: 3/2019 Results:  Interpretation Summary  Normal nuclear stress test, no evidence of infarction or ischemia  The EF is calculated at 72%  There is normal left ventricular wall motion.  There was no significant ST segment depression.  Symptoms experienced during stress were: no symptoms.    ECG Reviewed: Date: 7/2019 Results:  NSR    Cath: Date: Results:      METS/Exercise Tolerance:     Hematologic:  - neg hematologic  ROS  (-) history of blood transfusion   Musculoskeletal:  - neg musculoskeletal ROS     GI/Hepatic:     (+) liver disease (NAM),     Renal/Genitourinary:  - neg Renal ROS     Endo:     (+) type II DM, Last HgA1c: 6.6, date: 2/2021, Not using insulin, - not using insulin pump. Obesity (BMI 50),     Psychiatric/Substance Use:  - neg psychiatric ROS     Infectious Disease:  - neg infectious disease ROS    "  Malignancy:  - neg malignancy ROS     Other:                The complete review of systems is negative other than noted in the HPI or here.   Temp: 97.9  F (36.6  C) Temp src: Oral BP: 111/85 Pulse: 89   Resp: 16 SpO2: 100 %         320 lbs 0 oz  5' 7.5\"[PT REPORTED[   Body mass index is 49.38 kg/m .       Physical Exam  Constitutional: Awake, alert, cooperative, no apparent distress, and appears stated age.  Eyes: Pupils equal, round and reactive to light, extra ocular muscles intact, sclera clear, conjunctiva normal.  HENT: Normocephalic, oral pharynx with moist mucus membranes, good dentition.  Respiratory: Clear to auscultation bilaterally, no crackles or wheezing.  Cardiovascular: Regular rate and rhythm, normal S1 and S2, and no murmur noted.  Carotids no bruits. No edema. Palpable pulses to radial arteries.   GI: Normal bowel sounds, soft, non-distended, non-tender  Genitourinary:  deferred  Skin: Warm and dry.  No rashes at anticipated surgical site.   Musculoskeletal: Full ROM of neck. There is no redness, warmth, or swelling of the exopsed joints. Gross motor strength is normal.    Neurologic: Awake, alert, oriented to name, place and time. Cranial nerves II-XII are grossly intact. Gait is normal.   Neuropsychiatric: Calm, cooperative. Normal affect.     Labs: (personally reviewed)  Component      Latest Ref Rng & Units 3/16/2021   Sodium      133 - 144 mmol/L 138   Potassium      3.4 - 5.3 mmol/L 4.0   Chloride      94 - 109 mmol/L 107   Carbon Dioxide      20 - 32 mmol/L 27   Anion Gap      3 - 14 mmol/L 4   Glucose      70 - 99 mg/dL 108 (H)   Urea Nitrogen      7 - 30 mg/dL 20   Creatinine      0.52 - 1.04 mg/dL 0.86   GFR Estimate      >60 mL/min/1.73:m2 77   GFR Estimate If Black      >60 mL/min/1.73:m2 90   Calcium      8.5 - 10.1 mg/dL 9.5   Bilirubin Total      0.2 - 1.3 mg/dL 0.7   Albumin      3.4 - 5.0 g/dL 3.9   Protein Total      6.8 - 8.8 g/dL 8.4   Alkaline Phosphatase      40 - 150 U/L " 69   ALT      0 - 50 U/L 42   AST      0 - 45 U/L 22   Color Urine       Yellow   Appearance Urine       Clear   Glucose Urine      NEG:Negative mg/dL Negative   Bilirubin Urine      NEG:Negative Negative   Ketones Urine      NEG:Negative mg/dL 5 (A)   Specific Gravity Urine      1.003 - 1.035 1.024   Blood Urine      NEG:Negative Negative   pH Urine      5.0 - 7.0 pH 5.0   Protein Albumin Urine      NEG:Negative mg/dL Negative   Urobilinogen mg/dL      0.0 - 2.0 mg/dL 0.0   Nitrite Urine      NEG:Negative Negative   Leukocyte Esterase Urine      NEG:Negative Negative   Source       Midstream Urine   WBC      4.0 - 11.0 10e9/L 10.3   RBC Count      3.8 - 5.2 10e12/L 5.40 (H)   Hemoglobin      11.7 - 15.7 g/dL 14.8   Hematocrit      35.0 - 47.0 % 46.0   MCV      78 - 100 fl 85   MCH      26.5 - 33.0 pg 27.4   MCHC      31.5 - 36.5 g/dL 32.2   RDW      10.0 - 15.0 % 13.6   Platelet Count      150 - 450 10e9/L 283       EKG 7/2019  Normal sinus rhythm  Low voltage QRS  Cannot rule out Anterior infarct ,age indeterminate  Abnormal ECG    stress test 3/2019  Interpretation Summary  Normal nuclear stress test, no evidence of infarction or ischemia  The EF is calculated at 72%  There is normal left ventricular wall motion.  There was no significant ST segment depression.  Symptoms experienced during stress were: no symptoms.      Outside records reviewed from: care everywhere    ASSESSMENT and PLAN  Lorena Sanches is a 55 year old female scheduled for sleeve gastrectomy, hiatal hernia repair and liver biopsy on 4/5/21 by Dr. Zambrano in treatment of morbid obesity.  PAC referral for risk assessment and optimization for anesthesia with comorbid conditions of dyslipidemia, diabetes, IBS, NAM:    Pre-operative considerations:    1.  Cardiac:  Functional status- METS >4. Denies cardiac symptoms. Edema using hydrochlorothiazide PRN. dyslipidemia using lipitor.  previous cardiac testing as above. intermediate risk surgery with  0.9% (RCRI #) risk of major adverse cardiac event.    2.  Pulm:  Airway feasible.  MJ risk: high. non smoker. Denies pulmonary symptoms. COVID testing per surgery team.     3.  GI:  Risk of PONV score = 3.  If > 2, anti-emetic intervention recommended. h/o IBS.  NAM- most recent LFTs nml.     4.  endo: diabetes using metformin. A1c 2/2021 6.6.  BMI 50.  above procedure planned.     5. Access: patient reports a history of difficult IV access    VTE risk: 0.5%    Patient is optimized and is acceptable candidate for the proposed procedure.  No further diagnostic evaluation is needed.    40 minutes were spent completing chart review, seeing the patient, reviewing labs and test results and completing documentation      Ruba Gallo PA-C  Preoperative Assessment Center  [facility]  Phone: [phone]  Fax: [phone]

## 2021-03-16 NOTE — ANESTHESIA PREPROCEDURE EVALUATION
Anesthesia Pre-Procedure Evaluation    Patient: Stephanie Sanches   MRN: [id number] : 1965        Preoperative Diagnosis: * No surgery found *   Procedure :      No past medical history on file.   Past Surgical History:   Procedure Laterality Date     CHOLECYSTECTOMY        Allergies   Allergen Reactions     Cefaclor Hives     Cephalexin Hives     Dust Mite Extract Other (See Comments)     Triamcinolone Other (See Comments) and Unknown     Injection redness over body       Cortisone Rash     Reaction to cortisone shot in knee        Social History     Tobacco Use     Smoking status: Never Smoker     Smokeless tobacco: Never Used   Substance Use Topics     Alcohol use: Not on file      Wt Readings from Last 1 Encounters:   21 147.4 kg (325 lb)        Anesthesia Evaluation   Pt has had prior anesthetic. Type: General.    History of anesthetic complications  - PONV.      ROS/MED HX  ENT/Pulmonary:  - neg pulmonary ROS  (-) tobacco use   Neurologic:  - neg neurologic ROS     Cardiovascular:     (+) Dyslipidemia -----Taking blood thinners Previous cardiac testing   Echo: Date: Results:    Stress Test: Date: 3/2019 Results:  Interpretation Summary  Normal nuclear stress test, no evidence of infarction or ischemia  The EF is calculated at 72%  There is normal left ventricular wall motion.  There was no significant ST segment depression.  Symptoms experienced during stress were: no symptoms.    ECG Reviewed: Date: 2019 Results:  NSR    Cath: Date: Results:   (-) murmur   METS/Exercise Tolerance: >4 METS Comment: Stationary bike, walking and lifting some weights in preparation for surgery   Hematologic:  - neg hematologic  ROS  (-) history of blood transfusion   Musculoskeletal: Comment: Cervical spine pain      GI/Hepatic:     (+) liver disease (NAM),     Renal/Genitourinary:  - neg Renal ROS     Endo:     (+) type II DM, Last HgA1c: 6.6, date: 2021, Not using insulin, - not using insulin pump. Obesity  (BMI 50),     Psychiatric/Substance Use:  - neg psychiatric ROS     Infectious Disease:  - neg infectious disease ROS     Malignancy:  - neg malignancy ROS     Other:            Physical Exam    Airway        Mallampati: III   TM distance: < 3 FB   Neck ROM: full   Mouth opening: > 3 cm    Respiratory Devices and Support         Dental  no notable dental history         Cardiovascular          Rhythm and rate: regular and normal (-) no murmur    Pulmonary   pulmonary exam normal        breath sounds clear to auscultation           OUTSIDE LABS:  CBC: No results found for: WBC, HGB, HCT, PLT  BMP: No results found for: NA, POTASSIUM, CHLORIDE, CO2, BUN, CR, GLC  COAGS: No results found for: PTT, INR, FIBR  POC: No results found for: BGM, HCG, HCGS  HEPATIC: No results found for: ALBUMIN, PROTTOTAL, ALT, AST, GGT, ALKPHOS, BILITOTAL, BILIDIRECT, ROSANNE  OTHER: No results found for: PH, LACT, A1C, HUSSEIN, PHOS, MAG, LIPASE, AMYLASE, TSH, T4, T3, CRP, SED          PAC Discussion and Assessment      Case is suitable for: [place]  Anesthetic techniques and relevant risks discussed: GA                  PAC Resident/NP Anesthesia Assessment: Lorena Sanches is a 55 year old female scheduled for sleeve gastrectomy, hiatal hernia repair and liver biopsy on 4/5/21 by Dr. Zambrano in treatment of morbid obesity.  PAC referral for risk assessment and optimization for anesthesia with comorbid conditions of dyslipidemia, diabetes, IBS, NAM:    Pre-operative considerations:    1.  Cardiac:  Functional status- METS >4. Denies cardiac symptoms. Edema using hydrochlorothiazide PRN. dyslipidemia using lipitor.  previous cardiac testing as above. intermediate risk surgery with 0.9% (RCRI #) risk of major adverse cardiac event.    2.  Pulm:  Airway feasible.  MJ risk: high. non smoker. Denies pulmonary symptoms. COVID testing per surgery team.     3.  GI:  Risk of PONV score = 3.  If > 2, anti-emetic intervention recommended. h/o IBS.   NAM- most recent LFTs nml.     4.  endo: diabetes using metformin. A1c 2/2021 6.6.  BMI 50.  above procedure planned.     5. Access: patient reports a history of difficult IV access    VTE risk: 0.5%    Patient is optimized and is acceptable candidate for the proposed procedure.  No further diagnostic evaluation is needed.    **For further details of assessment, testing, and physical exam please see H and P completed on same date.      JOSE Stokes PA-C

## 2021-03-17 ENCOUNTER — VIRTUAL VISIT (OUTPATIENT)
Dept: PHARMACY | Facility: CLINIC | Age: 56
End: 2021-03-17
Payer: COMMERCIAL

## 2021-03-17 ENCOUNTER — CARE COORDINATION (OUTPATIENT)
Dept: ENDOCRINOLOGY | Facility: CLINIC | Age: 56
End: 2021-03-17

## 2021-03-17 DIAGNOSIS — E66.01 MORBID OBESITY (H): Primary | ICD-10-CM

## 2021-03-17 DIAGNOSIS — R22.43 LOCALIZED SWELLING OF BOTH LOWER LEGS: ICD-10-CM

## 2021-03-17 DIAGNOSIS — E11.69 DIABETES MELLITUS TYPE 2 IN OBESE: ICD-10-CM

## 2021-03-17 DIAGNOSIS — Z78.9 TAKES DIETARY SUPPLEMENTS: ICD-10-CM

## 2021-03-17 DIAGNOSIS — E78.5 HYPERLIPIDEMIA, UNSPECIFIED HYPERLIPIDEMIA TYPE: ICD-10-CM

## 2021-03-17 DIAGNOSIS — E66.9 DIABETES MELLITUS TYPE 2 IN OBESE: ICD-10-CM

## 2021-03-17 DIAGNOSIS — E55.9 VITAMIN D DEFICIENCY: ICD-10-CM

## 2021-03-17 PROCEDURE — 99607 MTMS BY PHARM ADDL 15 MIN: CPT | Mod: TEL | Performed by: PHARMACIST

## 2021-03-17 PROCEDURE — 99605 MTMS BY PHARM NP 15 MIN: CPT | Mod: TEL | Performed by: PHARMACIST

## 2021-03-17 NOTE — PROGRESS NOTES
Medication Therapy Management (MTM) Encounter    ASSESSMENT:                            Medication Adherence/Access: No issues identified    Obesity: Would benefit from tapering off topiramate prior to surgery per patient preference. Due to patient not meeting weight loss goal, recommended to follow up with dietitian to discuss next steps to ensure she gets to weight goal prior to surgery.     Type 2 Diabetes:  A1c at goal <7%. Due to progress, would benefit from holding metformin post op.     Hyperlipidemia: LDL at goal. Would benefit from holding atorvastatin 1 month post op, then restarting for now.     Supplements: Needs improvement. Would benefit from holding calcium 1 week before surgery and restarting calcium as chewable calcium citrate 1 month post op.     Vitamin D Deficiency:  Will benefit from post bariatric vitamin D lab to see where things are at post op. Would benefit from holding for 1 week and restarting 1 month post op. If continues to be low, may want to consider switching to non-gelcap Vitamin D3 that may be better asborbed.     Lower Leg Swelling: Stable. Would benefit to continue hydrochlorothiazide prn to decrease risk of dehydration.     PLAN:                            1. Hold atorvastatin 1 month after surgery, then can restart.     2. Can continue to hold aspirin for now.     3. Hold metformin day of surgery and do not restart post op.     4. Hold multivitamin 1 week before surgery, and can restart a chewable multivitamin 1 week after surgery.     5. Hold vitamin D 1 week before surgery, can restart 1 month post op. If continues to have low vitamin D post op, may benefit from switching to non gelcap vitamin D3.     6. One week before surgery, decrease topiramate to 25 mg nightly for 1 week. Last dose should be the night before surgery.     Follow-up: 2-4 weeks post op    SUBJECTIVE/OBJECTIVE:                          Stephanie Sanches is a 55 year old female called for an initial visit. She  "was referred to me from Dr. Zambrano, Bariatric Surgeon, and Farida Seymour CNP.      Reason for visit: discuss plans for medications after surgery. She is having sleeve gastrectomy 4/5/21.     Allergies/ADRs: Reviewed in chart  Tobacco: She reports that she has never smoked. She has never used smokeless tobacco.  Alcohol: not currently using  Caffeine: no caffeine  Past Medical History: Reviewed in chart. Hx NAM, working with GI/Hep.    Medication Adherence/Access:   Patient uses pill box(es), AM and PM pill box.   Patient takes medications 2 time(s) per day.   Per patient, misses medication 0 times per week.     Obesity:   Topiramate 50 mg once daily.     Followed by Farida Seymour NP seen for Bariatric Surgery Consult. She has sleeve gastrectomy 4/5/21. Patient feels nervous and excited for surgery. Patient is experiencing the follow side effects: None. She would prefer to trial off topiramate prior to surgery. Followed by dietitian.     Current weight today: 320 lb   Initial Consult Weight: 326 lb  Goal Weight Prior to Surgery: 306 lb   Cumulative Weight Loss: -6 lb  Wt Readings from Last 4 Encounters:   03/16/21 320 lb (145.2 kg)   02/17/21 325 lb (147.4 kg)   01/20/21 328 lb (148.8 kg)   12/02/20 326 lb (147.9 kg)       Estimated body mass index is 49.38 kg/m  as calculated from the following:    Height as of 3/16/21: 5' 7.5\" (1.715 m).    Weight as of 3/16/21: 320 lb (145.2 kg).    Type 2 Diabetes:    Metformin 1000 mg BID     Patient is not experiencing side effects.  Blood sugar monitoring: Continuous Glucose Monitor, Freestyle Marina; Ranges (patient reported): Fasting- 90s-120s; Post-Prandial- 140s-170s.  Symptoms of low blood sugar? none  Symptoms of high blood sugar? none  Eye exam: due  Foot exam: due  Diet/Exercise: she is not eating past 7pm and no snacking.   Aspirin: Taking 81mg daily and denies side effects. She stopped taking last Friday.   Statin: Yes: atorvastatin   ACEi/ARB: No.   Urine Albumin: " No results found for: UMALCR   11/12/2020: albumin/creatinine ratios  Care Everywhere Labs: Hemoglobin A1c 2/1/21: 6.6%, 11/12/2020: 7.1%     Wt Readings from Last 4 Encounters:   03/16/21 320 lb (145.2 kg)   02/17/21 325 lb (147.4 kg)   01/20/21 328 lb (148.8 kg)   12/02/20 326 lb (147.9 kg)     Hyperlipidemia:   Atorvastatin 10mg daily.      Patient reports no significant myalgias or other side effects. Has family history on mother's side - uncle had stroke in 40s and other family members at older age.   Lab 2/13/2020:  Cholesterol 114 - 200 mg/dL 160    HDL Cholesterol 40 - 60 mg/dL 64High     Triglycerides 10 - 200 mg/dL 187    LDL Cholesterol, Calculated  mg/dL 59      Current 10 year ASCVD risk: 1.6%    Supplements:   Calcium carbonate 600 mg BID   Probiotic daily     She started probiotic for general gut health. She does find effective. She previously had a lot of issues of diarrhea, but improved over the last couple of years. Started calcium to pre    Vitamin D Deficiency:  Vitamin D2 50,000 international unit(s) every 2 weeks    She has been taking high dose for 1 year. She reports when she was taking this monthly for several months her vitamin D level barely changed, so she would like to continue this post op.     Lower Leg Swelling:   Hydrochlorothiazide 25 mg as needed    She is not currently taking. She gets swelling more in right leg from the heat, not currently an issue as not summer.   BP Readings from Last 3 Encounters:   03/16/21 111/85   02/17/21 130/89     ----------------    I spent 40 minutes with this patient today. All changes were made via collaborative practice agreement with Farida Seymour CNP. A copy of the visit note was provided to the patient's referring provider.    The patient was sent via [employer] a summary of these recommendations.     Lauren Bloch, PharmD  Medication Therapy Management Pharmacist   [facility] [place]      Telemedicine Visit Details  Type  of service:  Telephone visit  Start Time: 3:16 PM  End Time: 4:01 PM  Originating Location (patient location): Home  Distant Location (provider location):   [facility]      Medication Therapy Recommendations  Diabetes mellitus type 2 in obese (H)    Current Medication: metFORMIN (GLUCOPHAGE) 1000 MG tablet   Rationale: Nonmedication therapy more appropriate - Unnecessary medication therapy - Indication   Recommendation: Discontinue Medication   Status: Accepted per CPA         Hyperlipidemia    Current Medication: atorvastatin (LIPITOR) 10 MG tablet   Rationale: Unsafe medication for the patient - Adverse medication event - Safety   Recommendation: Provide Education - Hold until 1 month post op then restart   Status: Accepted per Provider         Morbid obesity (H)    Current Medication: topiramate (TOPAMAX) 25 MG tablet   Rationale: Nonmedication therapy more appropriate - Unnecessary medication therapy - Indication   Recommendation: Discontinue Medication - decrease to 25 mg daily for 1 week then stop.   Status: Accepted per CPA         Takes dietary supplements    Current Medication: multivitamin w/minerals (MULTI-VITAMIN) tablet   Rationale: Unsafe medication for the patient - Adverse medication event - Safety   Recommendation: Provide Education - Hold 1 week before and restart chewable mulvitamin with iron 1 week after surgery   Status: Accepted - no CPA Needed         Vitamin D deficiency    Current Medication: ergocalciferol (ERGOCALCIFEROL) 1.25 MG (84142 UT) capsule   Rationale: Unsafe medication for the patient - Adverse medication event - Safety   Recommendation: Provide Education - hold 1 week before and up until 1 month after then restart   Status: Patient Agreed - Adherence/Education

## 2021-03-17 NOTE — Clinical Note
Hey you may have already talked to her but she is not at goal prior to surgery so I didn't know if you already have a plan in place to help her to get to that goal weight?

## 2021-03-17 NOTE — Clinical Note
SG 4/5/21. Plan for her meds - she is going to continue to hold aspirin post op, she is going to restart statin 1 month after. She has hx low vitamin D even apparently on ergo 50k weekly we talked about seeing where vitamin D is at post op (as already planned) and if still low may want to consider switching to D3 non-gel cap. We are taping off topiramate just prior to surgery. She is not at goal weight so contacting dietitinat o see if more needs to be done.   Jahaira BA

## 2021-03-22 ENCOUNTER — TELEPHONE (OUTPATIENT)
Dept: ENDOCRINOLOGY | Facility: CLINIC | Age: 56
End: 2021-03-22

## 2021-03-22 NOTE — TELEPHONE ENCOUNTER
Called [facility] ([facility]) as I submitted a prior authorization request on 2/26/21 for a sleeve gastrectomy with Dr. Zambrano and have not heard back. Spoke with Ynes who states this has been authorized under ref # [id number] for 4/5/21.

## 2021-03-24 NOTE — PATIENT INSTRUCTIONS
Recommendations from today's MTM visit:                                                    MTM (medication therapy management) is a service provided by a clinical pharmacist designed to help you get the most of out of your medicines.      1. Hold atorvastatin 1 month after surgery, then can restart.     2. Can continue to hold aspirin for now.     3. Hold metformin day of surgery and do not restart post op.     4. Hold multivitamin 1 week before surgery, and can restart a chewable multivitamin 1 week after surgery.     5. Hold vitamin D 1 week before surgery, can restart 1 month post op. If continues to have low vitamin D post op, may benefit from switching to non gelcap vitamin D3.     6. One week before surgery, decrease topiramate to 25 mg nightly for 1 week. Last dose should be the night before surgery.     Follow-up: 2-4 weeks post op    It was great to speak with you today.  I value your experience and would be very thankful for your time with providing feedback on our clinic survey. You may receive a survey via email or text message in the next few days.     My Clinical Pharmacist's contact information:                                                      It was a pleasure talking with you today!  Please feel free to contact me with any questions or concerns you have.      Lauren Bloch, PharmD  Medication Therapy Management Pharmacist   [facility]

## 2021-04-04 NOTE — OP NOTE
[facility]    Operative Notet    Pre-operative diagnosis: Morbid obesity   Non alcoholic steatohepatitis (NAM)  Osteoarthritis   Irritable bowel syndrome   Type II DM  Dyslipidemia     Post-operative diagnosis Morbid obesity   Non alcoholic steatohepatitis (NAM)  Osteoarthritis   Irritable bowel syndrome   Type II DM  Dyslipidemia     Procedure: 1. Laparoscopic sleeve gastrectomy   2. Laparoscopic liver biopsy   3. Esophagogastroscopy       Surgeon: Surgeon(s) and Role:     * Martin Zambrano MD - Primary      Assistant Surgeon    Anesthesia: Vanessa Cadena MD    General      Estimated blood loss: 20 ml      Drains: None     Specimens: Partial gastrectomy   Liver biopsy core      Findings: No hiatal hernia .     Complications: None.     Implants: None.         BOUGIE SIZE: 40 FR  DISTANCE FROM PYLORUS: 6CM  STAPLE LINE REINFORCEMENT: NO  STAPLE LINE OVERSEW: NO  COMORBIDITIES:   Past Medical History:   Diagnosis Date     Diabetes mellitus (H)      Dyslipidemia      Irritable bowel syndrome      NAM (nonalcoholic steatohepatitis)        INDICATIONS FOR PROCEDURE  Stephanie Sanches is a 55 year old female who is morbidly obese.  Numerous weight loss attempts without surgery have been without success.     After understanding the risks and benefits of proceeding with a laparoscopic vertical sleeve gastrectomy, she agreed to an operation as outlined by me. A laparoscopic liver biopsy is also being performed to assess the degree of fibrosis of her pre-existing NAM    I reviewed the risks of surgery with Stephanie Sanches.    These include, but are not limited to, death, myocardial infarction, pneumonia, urinary tract infection, deep venous thrombosis with or without pulmonary embolus, abdominal infection from bowel injury or abscess, bowel obstruction, wound infection, and bleeding.    More specific risks related to vertical sleeve gastrectomy were detailed at the  "bariatric informational seminar and include the followin.) leak at the vertical sleeve staple line, 2.) stricture in the sleeve, 3.) nausea, vomiting, and dehydration for several months, 4.) adhesions causing bowel obstruction, 5.) rapid weight loss causing a higher rate of gallstone formation during the first 6 months after surgery, 6.) decreased absorption of vitamins because of the reduced stomach size, 7.) weight regain if inappropriate food intake occurs.    The BMI that we are treating this patient for was measured at the initial consultation visit in our bariatric program and it was: 50.3 kg/m2 (as calculated just below).      The initial consult height, weight, and BMI are as follows:    Height: 5' 7\"   BARIATRIC WEIGHT TRACKING 2021   Initial BMI 50.3       Our weight loss surgery program requires weight loss prior to bariatric surgery and currently the height, weight, and BMI are as follows:     171.5 cm , 145.2 kg , and 49.4 kg/cm2    Due to the patient's comorbidity conditions of Type II DM, dyslipidemia, NAM and osteoarthritis , in association with elevated body mass index, bariatric surgery has been recommended and is being performed today.    Moreover, as the surgeon performing this procedure, I certify that the following are true in regards to this patient at or prior to the day of surgery:    1. The patient's body mass index (BMI) is or has been greater than or equal to 35 kg/m2.  2. The patient has at least one co-morbidity related to obesity (as outlined above).  3. The patient has been previously unsuccessful with medical treatment for obesity.  Please note that some of this information has been documented as part of a comprehensive pre-bariatric surgery process in the outpatient clinic and is NOT immediately available in the inpatient encounter for this bariatric operation.      OPERATIVE PROCEDURE:     Stephanierhonda Sanches was brought to the operating room and prepared in routine " fashion. Under the benefits of general anesthesia, a left upper quadrant Veress needle was inserted and pneumoperitoneum was established using carbon dioxide gas to a maximum pressure of 15 mmHg. A total of five ports were placed into the abdomen.   We next performed a liver biopsy using 14-gauge liver biopsy needle.  We found an area medial to the previous gallbladder site in the right lobe and we took a core biopsy directly by puncturing the liver. Cautery was used to stop any bleeding    A liver retractor was placed through the rightmost port and this provided a view of the upper stomach. The operation was started by dividing the short gastric vessels off the greater curvature of the stomach. This dissection was carried up to the angle of His, and Harmonic scalpel was used for dissection and  Hemostasis. We did encounter some bleeding near the spleen which was addressed with 10 mm clips and surgicel.      A bougie (size noted above) was passed into the stomach and we used 4 loads of the gold load Ethicon [place] flex linear cutter stapler device to create a vertical sleeve gastrectomy with the bougie as a template. The bougie was removed.    The sleeve gastrectomy specimen (partial gastrectomy) was now removed from the abdomen through the 15 mm port. Endoscopy was performed and revealed no air leak on insufflation of stomach.     Hemostasis was secured, and the liver retractor and all ports were removed from the abdomen under direct visualization.     All needle and sponge counts were correct x2 at the end of the operation, and I was present for all critical components of the procedure.     Skin incisions were closed using skin staples, and sterile dressings were placed.       Vanessa Cadena MD  PGY 5    I was present for the entire procedure.

## 2021-04-05 ENCOUNTER — ANESTHESIA (OUTPATIENT)
Dept: SURGERY | Facility: CLINIC | Age: 56
DRG: 621 | End: 2021-04-05
Payer: COMMERCIAL

## 2021-04-05 ENCOUNTER — ANESTHESIA EVENT (OUTPATIENT)
Dept: SURGERY | Facility: CLINIC | Age: 56
DRG: 621 | End: 2021-04-05
Payer: COMMERCIAL

## 2021-04-05 ENCOUNTER — HOSPITAL ENCOUNTER (INPATIENT)
Facility: CLINIC | Age: 56
LOS: 1 days | Discharge: HOME | DRG: 621 | End: 2021-04-06
Attending: SURGERY | Admitting: SURGERY
Payer: COMMERCIAL

## 2021-04-05 DIAGNOSIS — E66.01 MORBID OBESITY (H): Primary | ICD-10-CM

## 2021-04-05 LAB
CREAT SERPL-MCNC: 0.7 MG/DL (ref 0.52–1.04)
GFR SERPL CREATININE-BSD FRML MDRD: >90 ML/MIN/{1.73_M2}
GLUCOSE BLDC GLUCOMTR-MCNC: 125 MG/DL (ref 70–99)
GLUCOSE BLDC GLUCOMTR-MCNC: 168 MG/DL (ref 70–99)
GLUCOSE BLDC GLUCOMTR-MCNC: 172 MG/DL (ref 70–99)
GLUCOSE BLDC GLUCOMTR-MCNC: 199 MG/DL (ref 70–99)
PLATELET # BLD AUTO: 237 10E9/L (ref 150–450)

## 2021-04-05 PROCEDURE — 120N000002 HC R&B MED SURG/OB UMMC

## 2021-04-05 PROCEDURE — 710N000010 HC RECOVERY PHASE 1, LEVEL 2, PER MIN: Performed by: SURGERY

## 2021-04-05 PROCEDURE — 250N000025 HC SEVOFLURANE, PER MIN: Performed by: SURGERY

## 2021-04-05 PROCEDURE — 88305 TISSUE EXAM BY PATHOLOGIST: CPT | Mod: TC | Performed by: SURGERY

## 2021-04-05 PROCEDURE — 360N000077 HC SURGERY LEVEL 4, PER MIN: Performed by: SURGERY

## 2021-04-05 PROCEDURE — 250N000011 HC RX IP 250 OP 636: Performed by: NURSE ANESTHETIST, CERTIFIED REGISTERED

## 2021-04-05 PROCEDURE — 370N000017 HC ANESTHESIA TECHNICAL FEE, PER MIN: Performed by: SURGERY

## 2021-04-05 PROCEDURE — 88307 TISSUE EXAM BY PATHOLOGIST: CPT | Mod: TC | Performed by: SURGERY

## 2021-04-05 PROCEDURE — 250N000011 HC RX IP 250 OP 636: Performed by: SURGERY

## 2021-04-05 PROCEDURE — 250N000012 HC RX MED GY IP 250 OP 636 PS 637: Performed by: STUDENT IN AN ORGANIZED HEALTH CARE EDUCATION/TRAINING PROGRAM

## 2021-04-05 PROCEDURE — 999N000141 HC STATISTIC PRE-PROCEDURE NURSING ASSESSMENT: Performed by: SURGERY

## 2021-04-05 PROCEDURE — 88313 SPECIAL STAINS GROUP 2: CPT | Mod: TC | Performed by: SURGERY

## 2021-04-05 PROCEDURE — 0DB64Z3 EXCISION OF STOMACH, PERCUTANEOUS ENDOSCOPIC APPROACH, VERTICAL: ICD-10-PCS | Performed by: SURGERY

## 2021-04-05 PROCEDURE — 250N000009 HC RX 250: Performed by: NURSE ANESTHETIST, CERTIFIED REGISTERED

## 2021-04-05 PROCEDURE — 88313 SPECIAL STAINS GROUP 2: CPT | Mod: 26 | Performed by: PATHOLOGY

## 2021-04-05 PROCEDURE — 250N000009 HC RX 250: Performed by: STUDENT IN AN ORGANIZED HEALTH CARE EDUCATION/TRAINING PROGRAM

## 2021-04-05 PROCEDURE — 82565 ASSAY OF CREATININE: CPT | Performed by: STUDENT IN AN ORGANIZED HEALTH CARE EDUCATION/TRAINING PROGRAM

## 2021-04-05 PROCEDURE — 250N000011 HC RX IP 250 OP 636: Performed by: STUDENT IN AN ORGANIZED HEALTH CARE EDUCATION/TRAINING PROGRAM

## 2021-04-05 PROCEDURE — 999N001017 HC STATISTIC GLUCOSE BY METER IP

## 2021-04-05 PROCEDURE — 88307 TISSUE EXAM BY PATHOLOGIST: CPT | Mod: 26 | Performed by: PATHOLOGY

## 2021-04-05 PROCEDURE — 258N000003 HC RX IP 258 OP 636: Performed by: STUDENT IN AN ORGANIZED HEALTH CARE EDUCATION/TRAINING PROGRAM

## 2021-04-05 PROCEDURE — 85049 AUTOMATED PLATELET COUNT: CPT | Performed by: STUDENT IN AN ORGANIZED HEALTH CARE EDUCATION/TRAINING PROGRAM

## 2021-04-05 PROCEDURE — 36416 COLLJ CAPILLARY BLOOD SPEC: CPT | Performed by: STUDENT IN AN ORGANIZED HEALTH CARE EDUCATION/TRAINING PROGRAM

## 2021-04-05 PROCEDURE — 0FB14ZX EXCISION OF RIGHT LOBE LIVER, PERCUTANEOUS ENDOSCOPIC APPROACH, DIAGNOSTIC: ICD-10-PCS | Performed by: SURGERY

## 2021-04-05 PROCEDURE — 250N000009 HC RX 250: Performed by: SURGERY

## 2021-04-05 PROCEDURE — 258N000003 HC RX IP 258 OP 636: Performed by: NURSE ANESTHETIST, CERTIFIED REGISTERED

## 2021-04-05 PROCEDURE — 272N000001 HC OR GENERAL SUPPLY STERILE: Performed by: SURGERY

## 2021-04-05 PROCEDURE — 250N000011 HC RX IP 250 OP 636: Performed by: ANESTHESIOLOGY

## 2021-04-05 PROCEDURE — 88305 TISSUE EXAM BY PATHOLOGIST: CPT | Mod: 26 | Performed by: PATHOLOGY

## 2021-04-05 RX ORDER — FENTANYL CITRATE 50 UG/ML
25-50 INJECTION, SOLUTION INTRAMUSCULAR; INTRAVENOUS
Status: DISCONTINUED | OUTPATIENT
Start: 2021-04-05 | End: 2021-04-05 | Stop reason: HOSPADM

## 2021-04-05 RX ORDER — SODIUM CHLORIDE, SODIUM LACTATE, POTASSIUM CHLORIDE, CALCIUM CHLORIDE 600; 310; 30; 20 MG/100ML; MG/100ML; MG/100ML; MG/100ML
INJECTION, SOLUTION INTRAVENOUS CONTINUOUS
Status: DISCONTINUED | OUTPATIENT
Start: 2021-04-05 | End: 2021-04-06

## 2021-04-05 RX ORDER — HYOSCYAMINE SULFATE 0.125 MG
125 TABLET ORAL EVERY 4 HOURS PRN
Status: DISCONTINUED | OUTPATIENT
Start: 2021-04-05 | End: 2021-04-06 | Stop reason: HOSPADM

## 2021-04-05 RX ORDER — FENTANYL CITRATE 50 UG/ML
25-50 INJECTION, SOLUTION INTRAMUSCULAR; INTRAVENOUS EVERY 5 MIN PRN
Status: DISCONTINUED | OUTPATIENT
Start: 2021-04-05 | End: 2021-04-05 | Stop reason: HOSPADM

## 2021-04-05 RX ORDER — NALOXONE HYDROCHLORIDE 0.4 MG/ML
0.2 INJECTION, SOLUTION INTRAMUSCULAR; INTRAVENOUS; SUBCUTANEOUS
Status: DISCONTINUED | OUTPATIENT
Start: 2021-04-05 | End: 2021-04-05 | Stop reason: HOSPADM

## 2021-04-05 RX ORDER — NALOXONE HYDROCHLORIDE 0.4 MG/ML
0.4 INJECTION, SOLUTION INTRAMUSCULAR; INTRAVENOUS; SUBCUTANEOUS
Status: DISCONTINUED | OUTPATIENT
Start: 2021-04-05 | End: 2021-04-06 | Stop reason: HOSPADM

## 2021-04-05 RX ORDER — NALOXONE HYDROCHLORIDE 0.4 MG/ML
0.2 INJECTION, SOLUTION INTRAMUSCULAR; INTRAVENOUS; SUBCUTANEOUS
Status: DISCONTINUED | OUTPATIENT
Start: 2021-04-05 | End: 2021-04-06 | Stop reason: HOSPADM

## 2021-04-05 RX ORDER — ONDANSETRON 4 MG/1
4 TABLET, ORALLY DISINTEGRATING ORAL EVERY 6 HOURS PRN
Status: DISCONTINUED | OUTPATIENT
Start: 2021-04-05 | End: 2021-04-06 | Stop reason: HOSPADM

## 2021-04-05 RX ORDER — ALBUTEROL SULFATE 0.83 MG/ML
2.5 SOLUTION RESPIRATORY (INHALATION) EVERY 4 HOURS PRN
Status: DISCONTINUED | OUTPATIENT
Start: 2021-04-05 | End: 2021-04-05 | Stop reason: HOSPADM

## 2021-04-05 RX ORDER — NALOXONE HYDROCHLORIDE 0.4 MG/ML
0.4 INJECTION, SOLUTION INTRAMUSCULAR; INTRAVENOUS; SUBCUTANEOUS
Status: DISCONTINUED | OUTPATIENT
Start: 2021-04-05 | End: 2021-04-05 | Stop reason: HOSPADM

## 2021-04-05 RX ORDER — PROCHLORPERAZINE MALEATE 5 MG
10 TABLET ORAL EVERY 6 HOURS PRN
Status: DISCONTINUED | OUTPATIENT
Start: 2021-04-05 | End: 2021-04-06 | Stop reason: HOSPADM

## 2021-04-05 RX ORDER — DEXTROSE MONOHYDRATE 25 G/50ML
25-50 INJECTION, SOLUTION INTRAVENOUS
Status: DISCONTINUED | OUTPATIENT
Start: 2021-04-05 | End: 2021-04-06 | Stop reason: HOSPADM

## 2021-04-05 RX ORDER — HYDROMORPHONE HCL IN WATER/PF 6 MG/30 ML
0.2 PATIENT CONTROLLED ANALGESIA SYRINGE INTRAVENOUS
Status: DISCONTINUED | OUTPATIENT
Start: 2021-04-05 | End: 2021-04-06 | Stop reason: HOSPADM

## 2021-04-05 RX ORDER — PROPOFOL 10 MG/ML
INJECTION, EMULSION INTRAVENOUS PRN
Status: DISCONTINUED | OUTPATIENT
Start: 2021-04-05 | End: 2021-04-05

## 2021-04-05 RX ORDER — ONDANSETRON 2 MG/ML
4 INJECTION INTRAMUSCULAR; INTRAVENOUS EVERY 6 HOURS PRN
Status: DISCONTINUED | OUTPATIENT
Start: 2021-04-05 | End: 2021-04-06 | Stop reason: HOSPADM

## 2021-04-05 RX ORDER — ONDANSETRON 2 MG/ML
INJECTION INTRAMUSCULAR; INTRAVENOUS PRN
Status: DISCONTINUED | OUTPATIENT
Start: 2021-04-05 | End: 2021-04-05

## 2021-04-05 RX ORDER — HYDROMORPHONE HYDROCHLORIDE 1 MG/ML
.3-.5 INJECTION, SOLUTION INTRAMUSCULAR; INTRAVENOUS; SUBCUTANEOUS EVERY 10 MIN PRN
Status: DISCONTINUED | OUTPATIENT
Start: 2021-04-05 | End: 2021-04-05 | Stop reason: HOSPADM

## 2021-04-05 RX ORDER — SCOLOPAMINE TRANSDERMAL SYSTEM 1 MG/1
1 PATCH, EXTENDED RELEASE TRANSDERMAL
Status: DISCONTINUED | OUTPATIENT
Start: 2021-04-05 | End: 2021-04-06 | Stop reason: HOSPADM

## 2021-04-05 RX ORDER — LIDOCAINE HYDROCHLORIDE 20 MG/ML
INJECTION, SOLUTION INFILTRATION; PERINEURAL PRN
Status: DISCONTINUED | OUTPATIENT
Start: 2021-04-05 | End: 2021-04-05

## 2021-04-05 RX ORDER — CLINDAMYCIN PHOSPHATE 900 MG/50ML
900 INJECTION, SOLUTION INTRAVENOUS
Status: COMPLETED | OUTPATIENT
Start: 2021-04-05 | End: 2021-04-05

## 2021-04-05 RX ORDER — ONDANSETRON 4 MG/1
4 TABLET, ORALLY DISINTEGRATING ORAL EVERY 30 MIN PRN
Status: DISCONTINUED | OUTPATIENT
Start: 2021-04-05 | End: 2021-04-05 | Stop reason: HOSPADM

## 2021-04-05 RX ORDER — SODIUM CHLORIDE, SODIUM LACTATE, POTASSIUM CHLORIDE, CALCIUM CHLORIDE 600; 310; 30; 20 MG/100ML; MG/100ML; MG/100ML; MG/100ML
INJECTION, SOLUTION INTRAVENOUS CONTINUOUS PRN
Status: DISCONTINUED | OUTPATIENT
Start: 2021-04-05 | End: 2021-04-05

## 2021-04-05 RX ORDER — ONDANSETRON 2 MG/ML
4 INJECTION INTRAMUSCULAR; INTRAVENOUS EVERY 30 MIN PRN
Status: DISCONTINUED | OUTPATIENT
Start: 2021-04-05 | End: 2021-04-05 | Stop reason: HOSPADM

## 2021-04-05 RX ORDER — LIDOCAINE 40 MG/G
CREAM TOPICAL
Status: DISCONTINUED | OUTPATIENT
Start: 2021-04-05 | End: 2021-04-06 | Stop reason: HOSPADM

## 2021-04-05 RX ORDER — TOPIRAMATE 25 MG/1
50 TABLET, FILM COATED ORAL EVERY EVENING
Status: DISCONTINUED | OUTPATIENT
Start: 2021-04-06 | End: 2021-04-05

## 2021-04-05 RX ORDER — OXYCODONE HYDROCHLORIDE 5 MG/1
5 TABLET ORAL EVERY 4 HOURS PRN
Status: DISCONTINUED | OUTPATIENT
Start: 2021-04-05 | End: 2021-04-06 | Stop reason: HOSPADM

## 2021-04-05 RX ORDER — FENTANYL CITRATE 50 UG/ML
INJECTION, SOLUTION INTRAMUSCULAR; INTRAVENOUS PRN
Status: DISCONTINUED | OUTPATIENT
Start: 2021-04-05 | End: 2021-04-05

## 2021-04-05 RX ORDER — CLINDAMYCIN PHOSPHATE 900 MG/50ML
900 INJECTION, SOLUTION INTRAVENOUS SEE ADMIN INSTRUCTIONS
Status: DISCONTINUED | OUTPATIENT
Start: 2021-04-05 | End: 2021-04-05 | Stop reason: HOSPADM

## 2021-04-05 RX ORDER — DEXAMETHASONE SODIUM PHOSPHATE 4 MG/ML
INJECTION, SOLUTION INTRA-ARTICULAR; INTRALESIONAL; INTRAMUSCULAR; INTRAVENOUS; SOFT TISSUE PRN
Status: DISCONTINUED | OUTPATIENT
Start: 2021-04-05 | End: 2021-04-05

## 2021-04-05 RX ORDER — MEPERIDINE HYDROCHLORIDE 25 MG/ML
12.5 INJECTION INTRAMUSCULAR; INTRAVENOUS; SUBCUTANEOUS
Status: DISCONTINUED | OUTPATIENT
Start: 2021-04-05 | End: 2021-04-05 | Stop reason: HOSPADM

## 2021-04-05 RX ORDER — DIPHENHYDRAMINE HYDROCHLORIDE 50 MG/ML
25 INJECTION INTRAMUSCULAR; INTRAVENOUS EVERY 6 HOURS PRN
Status: DISCONTINUED | OUTPATIENT
Start: 2021-04-05 | End: 2021-04-06 | Stop reason: HOSPADM

## 2021-04-05 RX ORDER — NICOTINE POLACRILEX 4 MG
15-30 LOZENGE BUCCAL
Status: DISCONTINUED | OUTPATIENT
Start: 2021-04-05 | End: 2021-04-06 | Stop reason: HOSPADM

## 2021-04-05 RX ORDER — HYDRALAZINE HYDROCHLORIDE 20 MG/ML
2.5-5 INJECTION INTRAMUSCULAR; INTRAVENOUS
Status: DISCONTINUED | OUTPATIENT
Start: 2021-04-05 | End: 2021-04-05 | Stop reason: HOSPADM

## 2021-04-05 RX ORDER — DIPHENHYDRAMINE HCL 25 MG
25 CAPSULE ORAL EVERY 6 HOURS PRN
Status: DISCONTINUED | OUTPATIENT
Start: 2021-04-05 | End: 2021-04-06 | Stop reason: HOSPADM

## 2021-04-05 RX ADMIN — ROCURONIUM BROMIDE 80 MG: 10 INJECTION INTRAVENOUS at 07:53

## 2021-04-05 RX ADMIN — LIDOCAINE HYDROCHLORIDE 100 MG: 20 INJECTION, SOLUTION INFILTRATION; PERINEURAL at 07:51

## 2021-04-05 RX ADMIN — PROCHLORPERAZINE EDISYLATE 5 MG: 5 INJECTION INTRAMUSCULAR; INTRAVENOUS at 11:12

## 2021-04-05 RX ADMIN — SODIUM CHLORIDE, POTASSIUM CHLORIDE, SODIUM LACTATE AND CALCIUM CHLORIDE: 600; 310; 30; 20 INJECTION, SOLUTION INTRAVENOUS at 07:39

## 2021-04-05 RX ADMIN — ONDANSETRON 4 MG: 2 INJECTION INTRAMUSCULAR; INTRAVENOUS at 10:23

## 2021-04-05 RX ADMIN — FENTANYL CITRATE 25 MCG: 50 INJECTION, SOLUTION INTRAMUSCULAR; INTRAVENOUS at 10:54

## 2021-04-05 RX ADMIN — HYDROMORPHONE HYDROCHLORIDE 0.2 MG: 0.2 INJECTION, SOLUTION INTRAMUSCULAR; INTRAVENOUS; SUBCUTANEOUS at 14:01

## 2021-04-05 RX ADMIN — HYDRALAZINE HYDROCHLORIDE 5 MG: 20 INJECTION, SOLUTION INTRAMUSCULAR; INTRAVENOUS at 11:10

## 2021-04-05 RX ADMIN — DEXAMETHASONE SODIUM PHOSPHATE 6 MG: 4 INJECTION, SOLUTION INTRA-ARTICULAR; INTRALESIONAL; INTRAMUSCULAR; INTRAVENOUS; SOFT TISSUE at 07:53

## 2021-04-05 RX ADMIN — MIDAZOLAM 2 MG: 1 INJECTION INTRAMUSCULAR; INTRAVENOUS at 07:50

## 2021-04-05 RX ADMIN — HYDROMORPHONE HYDROCHLORIDE 0.3 MG: 1 INJECTION, SOLUTION INTRAMUSCULAR; INTRAVENOUS; SUBCUTANEOUS at 12:16

## 2021-04-05 RX ADMIN — ROCURONIUM BROMIDE 20 MG: 10 INJECTION INTRAVENOUS at 09:00

## 2021-04-05 RX ADMIN — HYDROMORPHONE HYDROCHLORIDE 0.2 MG: 0.2 INJECTION, SOLUTION INTRAMUSCULAR; INTRAVENOUS; SUBCUTANEOUS at 19:34

## 2021-04-05 RX ADMIN — SODIUM CHLORIDE, POTASSIUM CHLORIDE, SODIUM LACTATE AND CALCIUM CHLORIDE: 600; 310; 30; 20 INJECTION, SOLUTION INTRAVENOUS at 10:28

## 2021-04-05 RX ADMIN — INSULIN ASPART 1 UNITS: 100 INJECTION, SOLUTION INTRAVENOUS; SUBCUTANEOUS at 19:39

## 2021-04-05 RX ADMIN — FENTANYL CITRATE 25 MCG: 50 INJECTION, SOLUTION INTRAMUSCULAR; INTRAVENOUS at 11:06

## 2021-04-05 RX ADMIN — FENTANYL CITRATE 250 MCG: 50 INJECTION, SOLUTION INTRAMUSCULAR; INTRAVENOUS at 07:50

## 2021-04-05 RX ADMIN — FENTANYL CITRATE 25 MCG: 50 INJECTION, SOLUTION INTRAMUSCULAR; INTRAVENOUS at 10:22

## 2021-04-05 RX ADMIN — PROPOFOL 150 MG: 10 INJECTION, EMULSION INTRAVENOUS at 07:53

## 2021-04-05 RX ADMIN — INSULIN ASPART 2 UNITS: 100 INJECTION, SOLUTION INTRAVENOUS; SUBCUTANEOUS at 16:18

## 2021-04-05 RX ADMIN — ENOXAPARIN SODIUM 40 MG: 100 INJECTION SUBCUTANEOUS at 07:30

## 2021-04-05 RX ADMIN — ONDANSETRON 4 MG: 2 INJECTION INTRAMUSCULAR; INTRAVENOUS at 07:53

## 2021-04-05 RX ADMIN — ONDANSETRON 4 MG: 4 TABLET, ORALLY DISINTEGRATING ORAL at 14:01

## 2021-04-05 RX ADMIN — SUGAMMADEX 200 MG: 100 INJECTION, SOLUTION INTRAVENOUS at 09:45

## 2021-04-05 RX ADMIN — SCOPALAMINE 1 PATCH: 1 PATCH, EXTENDED RELEASE TRANSDERMAL at 16:15

## 2021-04-05 RX ADMIN — CLINDAMYCIN PHOSPHATE 900 MG: 900 INJECTION, SOLUTION INTRAVENOUS at 07:57

## 2021-04-05 RX ADMIN — FENTANYL CITRATE 25 MCG: 50 INJECTION, SOLUTION INTRAMUSCULAR; INTRAVENOUS at 10:32

## 2021-04-05 RX ADMIN — HYDROMORPHONE HYDROCHLORIDE 0.5 MG: 1 INJECTION, SOLUTION INTRAMUSCULAR; INTRAVENOUS; SUBCUTANEOUS at 09:52

## 2021-04-05 ASSESSMENT — ACTIVITIES OF DAILY LIVING (ADL)
ADLS_ACUITY_SCORE: 14
ADLS_ACUITY_SCORE: 14

## 2021-04-05 ASSESSMENT — MIFFLIN-ST. JEOR
SCORE: 2037.63
SCORE: 2107.63

## 2021-04-05 NOTE — ANESTHESIA POSTPROCEDURE EVALUATION
Patient: Stephanie Sanches    Procedure(s):  GASTRECTOMY, SLEEVE, LAPAROSCOPIC  BIOPSY, LIVER, LAPAROSCOPIC    Diagnosis:Morbid obesity (H) [E66.01]  Diagnosis Additional Information: No value filed.    Anesthesia Type:  General    Note:  Disposition: Admission   Postop Pain Control: Uneventful            Sign Out: Well controlled pain   PONV: No   Neuro/Psych: Uneventful            Sign Out: Acceptable/Baseline neuro status   Airway/Respiratory: Uneventful            Sign Out: Acceptable/Baseline resp. status   CV/Hemodynamics: Uneventful            Sign Out: Acceptable CV status   Other NRE: NONE   DID A NON-ROUTINE EVENT OCCUR? No    Event details/Postop Comments:  IV infiltrated w/ small amount of fentanyl and LR infused. Site evaluated and will monitor. New IV started on opposite arm. Pain and nausea improving. Okay for discharge from PACU.          Last vitals:  Vitals:    04/05/21 1015 04/05/21 1030 04/05/21 1045   BP: (!) 165/101 (!) 161/91 (!) 168/89   Pulse: 73 64 62   Resp: 14 14    Temp: 35.1  C (95.2  F) 35.2  C (95.4  F) 35.1  C (95.2  F)   SpO2: 97% 97% 98%       Last vitals prior to Anesthesia Care Transfer:  CRNA VITALS  4/5/2021 0931 - 4/5/2021 1031      4/5/2021             Resp Rate (observed):  (!) 1          Electronically Signed By: Serjio Knight MD  April 5, 2021  10:55 AM

## 2021-04-05 NOTE — PHARMACY-CONSULT NOTE
Bariatric Consult    Medications evaluated as requested per Bariatric Consult. Patient may swallow tablets/capsules ONLY if less than   inch.  Larger tablets/capsules should be broken, crushed or split.    No medication changes were needed based on the Bariatric Medication Management Policy.  Home medications have not been orders yet, will update this note as needed once home medications resumed.    The pharmacist will continue to follow as new medications are ordered.    Noble Deras RPH on 4/5/2021 at 1:51 PM

## 2021-04-05 NOTE — OR NURSING
Pt states that she has gotten shots in her neck to help with arm pain in neck and rt arm in the past.  More than a month ago from today.

## 2021-04-05 NOTE — OR NURSING
Dr. Tripathi notified left hand PIV infiltrated with approximately 10-20mL LR and 25mcg of fentanyl.  Warm compress placed at site.  MDA at bedside to assess and place another PIV.

## 2021-04-05 NOTE — ANESTHESIA PREPROCEDURE EVALUATION
Anesthesia Pre-Procedure Evaluation    Patient: Stephanie Sanches   MRN: [id number] : 1965        Preoperative Diagnosis: Morbid obesity (H) [E66.01]   Procedure : Procedure(s):  GASTRECTOMY, SLEEVE, LAPAROSCOPIC  HERNIORRHAPHY, HIATAL, LAPAROSCOPIC  BIOPSY, LIVER, LAPAROSCOPIC     Past Medical History:   Diagnosis Date     Diabetes mellitus (H)      Dyslipidemia      Irritable bowel syndrome      NAM (nonalcoholic steatohepatitis)       Past Surgical History:   Procedure Laterality Date     ANKLE SURGERY      reconstruction     ARTHROSCOPY KNEE       CHOLECYSTECTOMY       HYSTERECTOMY       NOSE SURGERY       TUBAL LIGATION        Allergies   Allergen Reactions     Cefaclor Hives     Cephalexin Hives     Dust Mite Extract Other (See Comments)     Triamcinolone Other (See Comments) and Unknown     Injection redness over body       Cortisone Rash     Reaction to cortisone shot in knee        Social History     Tobacco Use     Smoking status: Never Smoker     Smokeless tobacco: Never Used   Substance Use Topics     Alcohol use: Not Currently      Wt Readings from Last 1 Encounters:   21 140 kg (308 lb 10.3 oz)        Anesthesia Evaluation   Pt has had prior anesthetic. Type: General.    No history of anesthetic complications       ROS/MED HX  ENT/Pulmonary:  - neg pulmonary ROS     Neurologic:  - neg neurologic ROS     Cardiovascular:  - neg cardiovascular ROS     METS/Exercise Tolerance: >4 METS    Hematologic:  - neg hematologic  ROS     Musculoskeletal:  - neg musculoskeletal ROS     GI/Hepatic:     (+) liver disease,     Renal/Genitourinary:  - neg Renal ROS     Endo:     (+) type II DM, Not using insulin, Obesity,     Psychiatric/Substance Use:  - neg psychiatric ROS     Infectious Disease:  - neg infectious disease ROS     Malignancy:       Other:            Physical Exam    Airway        Mallampati: III   TM distance: > 3 FB   Neck ROM: full   Mouth opening: > 3 cm    Respiratory Devices and  Support         Dental           Cardiovascular   cardiovascular exam normal       Rhythm and rate: regular and normal     Pulmonary   pulmonary exam normal        breath sounds clear to auscultation           OUTSIDE LABS:  CBC:   Lab Results   Component Value Date    WBC 10.3 03/16/2021    HGB 14.8 03/16/2021    HCT 46.0 03/16/2021     03/16/2021     BMP:   Lab Results   Component Value Date     03/16/2021    POTASSIUM 4.0 03/16/2021    CHLORIDE 107 03/16/2021    CO2 27 03/16/2021    BUN 20 03/16/2021    CR 0.86 03/16/2021     (H) 03/16/2021     COAGS: No results found for: PTT, INR, FIBR  POC:   Lab Results   Component Value Date     (H) 04/05/2021     HEPATIC:   Lab Results   Component Value Date    ALBUMIN 3.9 03/16/2021    PROTTOTAL 8.4 03/16/2021    ALT 42 03/16/2021    AST 22 03/16/2021    ALKPHOS 69 03/16/2021    BILITOTAL 0.7 03/16/2021     OTHER:   Lab Results   Component Value Date    HUSSEIN 9.5 03/16/2021    TSH 2.56 03/16/2021       Anesthesia Plan    ASA Status:  3      Anesthesia Type: General.     - Airway: ETT   Induction: Intravenous.   Maintenance: Balanced.   Techniques and Equipment:     - Lines/Monitors: 2nd IV, BIS     Consents    Anesthesia Plan(s) and associated risks, benefits, and realistic alternatives discussed. Questions answered and patient/representative(s) expressed understanding.     - Discussed with:  Patient         Postoperative Care    Pain management: IV analgesics, Oral pain medications, Multi-modal analgesia.   PONV prophylaxis: Ondansetron (or other 5HT-3), Dexamethasone or Solumedrol     Comments:                Serjio Knight MD

## 2021-04-05 NOTE — PLAN OF CARE
Admitted/transferred from: PACU  2 RN full   skin assessment completed by Anay Sharif, RN and RA RN.  Skin assessment finding: issues found 5 lap sites covered with primapore dressing on abdomen and 2 staples open to air    Interventions/actions: skin interventions continue to monitor.        POD 0 Gastric Sleeve  Activity:patient up to bathroom   Neuros:alert and oriented x 4, drowsy  Cardiac:denies chest pain  Respiratory:capno, room air  GI/:LBM 4/4, voided  Diet: NPO except for ice chips  Skin:5 lap sites with primapore and 2 staples RC abdomen  Lines/Drains:PIV LR at 75/hour

## 2021-04-05 NOTE — ANESTHESIA PROCEDURE NOTES
Airway       Patient location during procedure: OR  Staff -        Performed By: CRNA  Consent for Airway        Urgency: elective  Indications and Patient Condition       Indications for airway management: toma-procedural       Induction type:intravenous       Mask difficulty assessment: 2 - vent by mask + OA or adjuvant +/- NMBA    Final Airway Details       Final airway type: endotracheal airway       Successful airway: ETT - single  Endotracheal Airway Details        ETT size (mm): 7.0       Cuffed: yes       Cuff volume (mL): 7       Successful intubation technique: video laryngoscopy       VL Blade Size: MAC 3       Grade View of Cords: 1       Adjucts: stylet       Position: Right       Secured at (cm): 22       Bite block used: None    Post intubation assessment        Placement verified by: capnometry, equal breath sounds and chest rise        Number of attempts at approach: 1       Secured with: plastic tape       Ease of procedure: easy       Dentition: Intact and Unchanged    Medication(s) Administered   Medication Administration Time: 4/5/2021 7:56 AM

## 2021-04-05 NOTE — PROGRESS NOTES
"SURGERY POSTOP CHECK      S: Repots nausea. Pain controlled. Some dizziness    O  BP (!) 158/86 (BP Location: Left arm)   Pulse 75   Temp 96  F (35.6  C) (Oral)   Resp 19   Ht 1.702 m (5' 7\")   Wt 140 kg (308 lb 10.3 oz)   SpO2 97%   BMI 48.34 kg/m    Gen: Aox 3  Resp: NLB  Abd: mildly distended, incision dressing intact     A/P  POD 0 s/p LVSG, Lap liver biopsy     - NPO except ice chips  - LR @75  - Monitor for tachy   - PRN dilaudid for pain control    Vanessa Cadena MD  Chief Resident (General Surgery)  PGY 5      "

## 2021-04-05 NOTE — ANESTHESIA CARE TRANSFER NOTE
Patient: Stephanie Sanches    Procedure(s):  GASTRECTOMY, SLEEVE, LAPAROSCOPIC  BIOPSY, LIVER, LAPAROSCOPIC    Diagnosis: Morbid obesity (H) [E66.01]  Diagnosis Additional Information: No value filed.    Anesthesia Type:   General     Note:    Oropharynx: oropharynx clear of all foreign objects  Level of Consciousness: drowsy  Oxygen Supplementation: face mask  Level of Supplemental Oxygen (L/min / FiO2): 6  Independent Airway: airway patency satisfactory and stable  Dentition: dentition unchanged  Vital Signs Stable: post-procedure vital signs reviewed and stable  Report to RN Given: handoff report given  Patient transferred to: PACU    Handoff Report: Identifed the Patient, Identified the Reponsible Provider, Reviewed the pertinent medical history, Discussed the surgical course, Reviewed Intra-OP anesthesia mangement and issues during anesthesia, Set expectations for post-procedure period and Allowed opportunity for questions and acknowledgement of understanding      Vitals: (Last set prior to Anesthesia Care Transfer)  CRNA VITALS  4/5/2021 0931 - 4/5/2021 1009      4/5/2021             Resp Rate (observed):  (!) 1        Electronically Signed By: LINDSEY Bhat CRNA  April 5, 2021  10:09 AM

## 2021-04-05 NOTE — LETTER
Formerly [facility] [address] [address]  Phone: [phone]    April 6, 2021        Stephanie Sanches  [address] [address]          To whom it may concern:    RE: Stephanie Sanches    Patient was hospitalized inpatient 4/5/2021- 4/6/2021 for a surgical procedure.    Please contact me for questions or concerns.      Sincerely,      Martha Gipson MD   General Surgery PGY2

## 2021-04-06 ENCOUNTER — PATIENT OUTREACH (OUTPATIENT)
Dept: CARE COORDINATION | Facility: CLINIC | Age: 56
End: 2021-04-06

## 2021-04-06 VITALS
WEIGHT: 293 LBS | BODY MASS INDEX: 45.99 KG/M2 | OXYGEN SATURATION: 95 % | DIASTOLIC BLOOD PRESSURE: 82 MMHG | RESPIRATION RATE: 18 BRPM | SYSTOLIC BLOOD PRESSURE: 149 MMHG | TEMPERATURE: 98.5 F | HEIGHT: 67 IN | HEART RATE: 69 BPM

## 2021-04-06 LAB
GLUCOSE BLDC GLUCOMTR-MCNC: 110 MG/DL (ref 70–99)
GLUCOSE BLDC GLUCOMTR-MCNC: 115 MG/DL (ref 70–99)
GLUCOSE BLDC GLUCOMTR-MCNC: 126 MG/DL (ref 70–99)

## 2021-04-06 PROCEDURE — 999N000127 HC STATISTIC PERIPHERAL IV START W US GUIDANCE

## 2021-04-06 PROCEDURE — 250N000013 HC RX MED GY IP 250 OP 250 PS 637: Performed by: STUDENT IN AN ORGANIZED HEALTH CARE EDUCATION/TRAINING PROGRAM

## 2021-04-06 PROCEDURE — 258N000003 HC RX IP 258 OP 636: Performed by: STUDENT IN AN ORGANIZED HEALTH CARE EDUCATION/TRAINING PROGRAM

## 2021-04-06 PROCEDURE — 250N000011 HC RX IP 250 OP 636: Performed by: STUDENT IN AN ORGANIZED HEALTH CARE EDUCATION/TRAINING PROGRAM

## 2021-04-06 PROCEDURE — 250N000013 HC RX MED GY IP 250 OP 250 PS 637: Performed by: ANESTHESIOLOGY

## 2021-04-06 PROCEDURE — 999N001017 HC STATISTIC GLUCOSE BY METER IP

## 2021-04-06 RX ORDER — OXYCODONE HYDROCHLORIDE 5 MG/1
5 TABLET ORAL EVERY 4 HOURS PRN
Qty: 20 TABLET | Refills: 0 | Status: SHIPPED | OUTPATIENT
Start: 2021-04-06 | End: 2021-04-14

## 2021-04-06 RX ORDER — HYOSCYAMINE SULFATE 0.125 MG
125 TABLET ORAL EVERY 4 HOURS PRN
Qty: 40 TABLET | Refills: 0 | Status: SHIPPED | OUTPATIENT
Start: 2021-04-06 | End: 2021-04-13

## 2021-04-06 RX ORDER — ONDANSETRON 4 MG/1
4 TABLET, ORALLY DISINTEGRATING ORAL EVERY 8 HOURS PRN
Qty: 20 TABLET | Refills: 0 | Status: SHIPPED | OUTPATIENT
Start: 2021-04-06 | End: 2021-10-13

## 2021-04-06 RX ADMIN — OXYCODONE HYDROCHLORIDE 5 MG: 5 TABLET ORAL at 12:36

## 2021-04-06 RX ADMIN — HYDROMORPHONE HYDROCHLORIDE 0.2 MG: 0.2 INJECTION, SOLUTION INTRAMUSCULAR; INTRAVENOUS; SUBCUTANEOUS at 00:28

## 2021-04-06 RX ADMIN — SODIUM CHLORIDE, POTASSIUM CHLORIDE, SODIUM LACTATE AND CALCIUM CHLORIDE: 600; 310; 30; 20 INJECTION, SOLUTION INTRAVENOUS at 00:04

## 2021-04-06 RX ADMIN — HYDROMORPHONE HYDROCHLORIDE 0.2 MG: 0.2 INJECTION, SOLUTION INTRAMUSCULAR; INTRAVENOUS; SUBCUTANEOUS at 05:20

## 2021-04-06 RX ADMIN — ENOXAPARIN SODIUM 40 MG: 100 INJECTION SUBCUTANEOUS at 08:48

## 2021-04-06 RX ADMIN — OMEPRAZOLE 20 MG: 20 CAPSULE, DELAYED RELEASE ORAL at 08:48

## 2021-04-06 ASSESSMENT — ACTIVITIES OF DAILY LIVING (ADL)
ADLS_ACUITY_SCORE: 14

## 2021-04-06 NOTE — PLAN OF CARE
Vitals:    04/06/21 0028 04/06/21 0319 04/06/21 0520 04/06/21 0908   BP:  (!) 146/69  (!) 149/82   BP Location:  Left arm  Left arm   Pulse: 70 67  69   Resp: 23 17 18 18   Temp:  98.4  F (36.9  C)  98.5  F (36.9  C)   TempSrc:  Oral  Oral   SpO2: 94% 93% 93% 95%   Weight:       Height:        Afebrile hypertensive sating 95% on room air, lungs clear non labor breathing,  Belly soft lap site staple removed, steri strip apply, tolerating clears,   Discharge script written, teaching has been done, follow up appointment set up,  Pt discharged home with family member.

## 2021-04-06 NOTE — PLAN OF CARE
Vital signs:  Temp: 98.4  F (36.9  C) Temp src: Oral BP: (!) 146/69 Pulse: 67   Resp: 17 SpO2: 93 % O2 Device: None (Room air)     Time: 2330-0730  Status: POD #1 s/p lap sleeve gastrectomy  Neuros: A&Ox4, calm. Neuros intact.   CMS: Intact, denies numbness/tingling.   Cardiac: HTN, denies chest pain.   Respiratory: LS clear, on room air, denies SOB, on capnography monitoring. IS encouraged.   Pain: abdominal pain managed with prn dilaudid with relief.   Nausea: Scopolamine patch in place, reports some belching, no emesis.   Diet: Bariatric clear liquids.   GI/: Voiding adequate amount. Abdomen soft, Hypo BS, -flatus, no Bm.   Skin/incisions: Abdominal lap sites x5 covered with primapore, CDI.   Lines: New PIV placed on R infusing LR at 75 ml/hr.   Drains: None  Labs:  and 110.  Activity: Up ambulated to BR with SBA.   New Changes this Shift: None.   Plan: patient wants to be discharged home by noon today. Continue POC.

## 2021-04-06 NOTE — DISCHARGE SUMMARY
GENERAL SURGERY DISCHARGE SUMMARY     NAME: Stephanie Sanches   MRN: [id number]   : 1965     DATE OF ADMISSION: 2021     PRE/POSTOPERATIVE DIAGNOSES:    Morbid obesity   Non alcoholic steatohepatitis (NAM)  Osteoarthritis   Irritable bowel syndrome   Type II DM  Dyslipidemia    PROCEDURES PERFORMED:     1. Laparoscopic sleeve gastrectomy   2. Laparoscopic liver biopsy   3. Esophagogastroscopy    PATHOLOGY RESULTS: Pending      CULTURE RESULTS: None     INTRAOPERATIVE COMPLICATIONS: None     POSTOPERATIVE COMPLICATIONS: None     DRAINS/TUBES PRESENT AT DISCHARGE: None    DATE OF DISCHARGE:      HOSPITAL COURSE: Stephanie Sanches is a 55 year old female who on 2021  underwent the above-named procedures.  She tolerated the operation well and postoperatively was transferred to the general post-surgical unit.  The remainder of her course was essentially uncomplicated.  Prior to discharge, her pain was controlled well, she was able to perform ADLs and ambulate independently without difficulty, and was tolerating clears.  On , she was discharged to home in stable condition .    DISCHARGE EXAM:   A&O, NAD  Resp non-labored  Distal extremities warm    Incisions C/D. Steri strips placed      DISCHARGE INSTRUCTIONS:  No discharge procedures on file.    DISCHARGE MEDICATIONS:   Current Discharge Medication List      CONTINUE these medications which have NOT CHANGED    Details   ergocalciferol (ERGOCALCIFEROL) 1.25 MG (17975 UT) capsule 50,000 Units every 14 days       multivitamin w/minerals (MULTI-VITAMIN) tablet Take 1 tablet by mouth 2 times daily      Probiotic Product (PROBIOTIC-10 PO) Take 1 capsule by mouth every morning       ASPIRIN 81 PO Take 81 mg by mouth every morning       atorvastatin (LIPITOR) 10 MG tablet Take 10 mg by mouth At Bedtime       calcium citrate-vitamin D (CALCIUM CITRATE +) 315-200 MG-UNIT TABS per tablet Take 1 tablet by mouth 2 times daily      Cetaphil Moisturizing (CETAPHIL)  external lotion Apply topically as needed      Continuous Blood Gluc  (FREESTYLE CARLOS 14 DAY READER) CINDI USE AS DIRECTED      Continuous Blood Gluc Sensor (FREESTYLE CARLOS 14 DAY SENSOR) MISC 1 Device      fluocinonide (LIDEX) 0.05 % external ointment Apply topically as needed       hydrochlorothiazide (HYDRODIURIL) 25 MG tablet as needed In the summer time only PRN      metFORMIN (GLUCOPHAGE) 1000 MG tablet Take 1,000 mg by mouth 2 times daily (with meals)       topiramate (TOPAMAX) 25 MG tablet Take 50 mg by mouth every evening              D/w Dr. Juan Manuel Cadena MD  Chief Resident (General Surgery)  PGY 5

## 2021-04-06 NOTE — PROGRESS NOTES
SUBJECTIVE  - Diffuse abdominal pain (rates 4/10 this morning). Improved when compared to yesterday. PRN dilaudid providing significant relief.  - Nausea yesterday post-op, improved with scopolamine patch. No nausea this morning.  - Tolerating clear liquid diet. Ice chips overnight and small volume water this morning.   - Urinating normally. No hematuria/dysuria.   - No BM, no flatus.   - No difficulty ambulating yesterday (went on walk around unit post-op)     OBJECTIVE     Temp: 98.4 F (24 hr max: 99.1 F)  HR: 67 bpm (24 hr range: 62-95 bpm)  RR: 18 rpm  BP: 146/69 mmHg (24 hr max, min: 133-198/)  SpO2: 93% on RA (desaturation to high 80s overnight, corrected w/ 1 L O2 NC)    Intake (8/24 hr): 60 mL (PO), 1097 mL (IV)/80 mL (PO), 2735 mL (IV)  Output (8/24 hr): 400 mL (UO)/1200 mL (UO)  Net (24 hr): 1615 mL     Physical Exam     General: NAD. Resting comfortably in bed.  Heart: RRR. Normal S1/S2. No audible murmur. No peripheral edema.   Respiratory: Lungs clear to auscultation. No crackles/wheeze.   Abdomen: Obese abdomen. Soft. Non-tender. Surgical incision dressings x5 intact and without surrounding erythema. Hypoactive bowel sounds.   Neuro: Alert and orientated. Intact sensation in bilateral UE/LE. Moving all extremities.  Psych: Normal mood and affect. Normal speech. Good judgement/insight.      Recent Labs:     4/5/2021 15:56 4/5/2021 19:39 4/5/2021 23:59 4/6/2021 03:18   Glucose 199 (H) 172 (H) 126 (H) 110 (H)      4/5/2021 14:36   Creatinine 0.70   GFR Estimate >90      4/5/2021 14:36   Platelet Count 237     Liver Biopsy (04/05/21): results pending    Recent Imaging: none     ASSESSMENT/PLAN    Stephanie Sanches is a 54 y/o F with PMHx significant for obesity (BMI>50), DMII, and NAM who is s/p laparoscopic sleeve gastrectomy and liver biopsy (POD #1).    Obesity (BMI 48.3)  S/p sleeve gastrectomy  Assessment: POD #1 following sleeve gastrectomy. Tolerating clear liquid diet. Pain under control with  PRNs. Staples removed and steri-strips placed on surgical wounds.  Plan:   - Discharge to home  - Bariatric post-op diet advancement schedule    - Discharge with PRN pain medication  - Stopped IV fluids/PRN IV Dilaudid    NAM  S/p liver biopsy  Assessment: POD #1 following laparoscopic liver biopsy. Pathology pending.    DMII  Assessment: PTA metformin. Most recent HgbA1c 6.6 (02/2021). BG in 100s during hospitalization.  Plan:  - MDSS   - Hold PTA metformin    Addendum    Doing well. With liquids, Home today    Vanessa Cadena MD  Chief Resident (General Surgery)  PGY 5

## 2021-04-06 NOTE — PLAN OF CARE
Vitals:    04/05/21 1700 04/05/21 1800 04/05/21 1858 04/05/21 1900   BP: (!) 151/74 (!) 147/71  (!) 150/72   BP Location: Left arm Left arm  Left arm   Pulse: 73 76  76   Resp: 17 18  17   Temp:       TempSrc:       SpO2: 94% 94%  93%   Weight:   147 kg (324 lb 1.2 oz)    Height:       Status: POD 0 lap sleeve gastrectomy  Pain/Nausea: IV dilaudid x1; no c/o nausea after scopalamine patch applied  Mobility: SBA, ambulated in halls and to bathroom  Diet: NPO ex ice chips, meds - tolerating diet. , 172 - Q4hr insulin  Labs: Reviewed  Lines: PIV infusing LR@75/hr  Drains: X  Skin/Incisions: no new deficits   Neuro: A&Ox4  Respiratory: WDL on room air, desats to high 80s while asleep - placed on 1L intermittently  Cardiac: Ex tachy on exertion, otherwise WDL  GI: LBM 4/4, BS hypoactive  : Voiding spont - AUOP  New Changes: X  Plan: Continue POC

## 2021-04-07 ENCOUNTER — PATIENT OUTREACH (OUTPATIENT)
Dept: ENDOCRINOLOGY | Facility: CLINIC | Age: 56
End: 2021-04-07

## 2021-04-07 LAB — COPATH REPORT: NORMAL

## 2021-04-07 NOTE — PROGRESS NOTES
RN Post-Op/Post-Discharge Care Coordination Note    Ms. Stephanie Sancehs is a 55 year old female who underwent laparoscopic gastric sleeve on 4/5/21 with  Dr. Martin Zambrano.  Spoke with Patient.    Support  Patient able to care for self independently     Health Status  Fevers/chills: Chills last night but they are gone now.  States both arms - wrists to elbows were reddened and warm.  They have lightened up.  States they look like she was sunburned.  Denies itching.    Nausea/Vomiting: Patient reports feeling nauseated yesterday.    Eating/drinking: Tolerating clear liquid diet. Reminded to drink small sips slowly.  Encouraged 1 oz every 10-15 minutes.    Fluid Ounces per day: Yesterday she thinks she got in around 30 oz.    Bowel habits: Patient reports having a normal bowel movement.    Activity:walking    Breathing: Reminded patient to use incentive spirometer- 5 to 10 deep breaths each hour while awake.    Other symptoms: Tachycardia: no, Dizziness/lightheadedness: no, Shortness of breath, dyspnea with exertion, leg pain/swelling: no.      Drains (JOCELYN): N/A    Incisions: Patient denies any signs and symptoms of infection.  Wound closure:  Steri-strips    Pain: Rates pain a 4 on a 10 Scale.  ITylenol.  Encouraged non-medication management modalities: Heating pad, with barrier, to abdomen    New Medications:  Omeprazole (opening capsules), Levsin, Zofran, home medications and patient was reminded not to take NSAIDS    Activity/Restrictions  No lifting in excess of 20 pounds for 4 weeks    Pathology reviewed with patient:  No, not yet available    Forms/Letters  No. All forms should be faxed to [phone].  Patient is planning on being off work for 1 week    Additional Questions: All of her questions were answered including reviewing diet, restrictions, and wound care.  She will call this office if she has any further questions and/or concerns.      Post op appointment on April 12 at 3:30 PM confirmed with the  patient:  Yes.    Whom and When to Call  Nurses: [phone]  Fax: [phone]     Patient advised if any concerns outside of clinic hours, to call hospital at [phone] and ask to speak to on-call bariatric resident. They should present to ED at [facility] to be assessed if urgency arises.      Risk for:  urinary tract infection, pneumonia, leg clots (deep vein thrombosis), lung clots (pulmonary emboli), injury to the bowels or other organs, bowel obstruction, hernia, and gastrointestinal bleeding.      Weight loss surgery side effects:  abdominal pain, cramping, bloating, difficulty swallowing, constipation, nausea, vomiting, diarrhea, frequent loose stools, dehydration, hair loss, excess skin, protein, iron and vitamin deficiencies, malnutrition, fatigue, and heartburn.    Bariatric surgery risks may include:  an internal leak at the staple line, narrowing of the esophagus, stomach or intestines (strictures), gallstones, bowel obstruction, inflammation of the esophagus or stomach, ulcers with or without bleeding, injuries to other organs, hernias, and iron deficiency.    Sleeve gastrectomy side effects:  leaks are more common after this procedure.  Risks include:  internal leak at the vertical sleeve staple line; nausea, vomiting, and dehydration for several months; bowel obstruction; gallstones during the first 6 months related to rapid weight loss; decreased absorption of vitamins and protein because of the reduced stomach size; weight regain if you increase food intake; narrowing of stomach, esophagus or intestines (stricture); injury to other organs; hernia; and ulcers.

## 2021-04-07 NOTE — PROGRESS NOTES
The patient was contacted by another RN for post-hospital follow up. Will close encounter at this time.    Sweta Benoit CMA, [facility]  Post Hospital Discharge Team

## 2021-04-12 ENCOUNTER — TELEPHONE (OUTPATIENT)
Dept: ENDOCRINOLOGY | Facility: CLINIC | Age: 56
End: 2021-04-12

## 2021-04-12 ENCOUNTER — VIRTUAL VISIT (OUTPATIENT)
Dept: ENDOCRINOLOGY | Facility: CLINIC | Age: 56
End: 2021-04-12
Payer: COMMERCIAL

## 2021-04-12 DIAGNOSIS — E55.9 VITAMIN D DEFICIENCY: ICD-10-CM

## 2021-04-12 DIAGNOSIS — E66.01 CLASS 3 SEVERE OBESITY DUE TO EXCESS CALORIES WITH SERIOUS COMORBIDITY AND BODY MASS INDEX (BMI) OF 50.0 TO 59.9 IN ADULT (H): ICD-10-CM

## 2021-04-12 DIAGNOSIS — Z71.3 NUTRITIONAL COUNSELING: ICD-10-CM

## 2021-04-12 DIAGNOSIS — E11.8 DIABETES MELLITUS TYPE 2 WITH COMPLICATIONS (H): ICD-10-CM

## 2021-04-12 DIAGNOSIS — Z98.84 S/P LAPAROSCOPIC SLEEVE GASTRECTOMY: Primary | ICD-10-CM

## 2021-04-12 DIAGNOSIS — E66.813 CLASS 3 SEVERE OBESITY DUE TO EXCESS CALORIES WITH SERIOUS COMORBIDITY AND BODY MASS INDEX (BMI) OF 50.0 TO 59.9 IN ADULT (H): ICD-10-CM

## 2021-04-12 PROCEDURE — 97803 MED NUTRITION INDIV SUBSEQ: CPT | Mod: GT | Performed by: DIETITIAN, REGISTERED

## 2021-04-12 NOTE — LETTER
"4/12/2021       RE: Stephanie Sanches  [address] [address]     Dear Colleague,    Thank you for referring your patient, Stephanie Sanches, to the [facility] [place] at [facility]. Please see a copy of my visit note below.    Stephanie Sanches is a 55 year old who is being evaluated via a billable video visit.      The patient has been notified of following:     \"This video visit will be conducted via a call between you and your physician/provider. We have found that certain health care needs can be provided without the need for an in-person physical exam.  This service lets us provide the care you need with a video conversation.  If a prescription is necessary we can send it directly to your pharmacy.  If lab work is needed we can place an order for that and you can then stop by our lab to have the test done at a later time.    Video visits are billed at different rates depending on your insurance coverage.  Please reach out to your insurance provider with any questions.    If during the course of the call the physician/provider feels a video visit is not appropriate, you will not be charged for this service.\"    Patient has given verbal consent for Video visit? Yes  How would you like to obtain your AVS? MyChart  If you are dropped from the video visit, the video invite should be resent to: Text to cell phone: [phone]  Will anyone else be joining your video visit? No        Video-Visit Details    Type of service:  Video Visit    Video Start Time: 3:57 PM  Video End Time: 4:47 PM    Originating Location (pt. Location): Home    Distant Location (provider location):  [facility] [place]     Platform used for Video Visit: [employer]    During this virtual visit the patient is located in [place], patient verifies this as the location during the entirety of this visit.     Nutrition " Assessment  Reason For Visit:  Stephanie Sanches is a 55 year old female presenting today for nutrition follow-up, 1 week s/p Sleeve Gastrectomy with Dr Zambrano (4/5/2021).  Patient referred by Dr. Zambrano on April 11, 2021.    Anthropometrics  Initial Consult Weight: 336 lbs  Day of Surgery Weight(4/5/2021): 308.7 lbs  Current Weight: 300 lbs ( per pt's report  Weight loss: -36 lbs from initial consult; -8.7 lbs from day of surgery    Current Vitamins/Minerals: None ( has equate children's chewable at home)      Nutrition History  Pt reports consuming and tolerating bariatric clear and low-fat full liquid diets. Fluid intake appears adequate, consuming 54-64 oz/day: water, powerade zero, protein water and diluted juice.     Denies and GI upset.    Today is first day of full liquid diet, has some yogurt, cream of wheat and premier protein drink, all sitting fine. Consuming a little more than a 1/4 cup but not able to finish a 1/2 cup per meal.     Blood sugars: She put back on her freestyle won, blood sugars running between 80-90, one episode of low blood sugar, 64. She corrected with 1-2 oz of strait juice.     Nutrition Prescription:  Grams Protein: 60 (minimum)  Amount of Fluid: 48-64 oz      Nutrition Diagnosis  Food and nutrition-related knowledge deficit r/t lack of prior exposure to diet advancements beyond bariatric low-fat full liquid diet aeb pt unable to verbalize understanding of bariatric pureed and soft diets.    Intervention  Intervention At Appointment:  Materials/education provided on bariatric pureed and soft diets, protein intake, fluid intake, eating pace, portion control, avoiding excess sugar and fat, recommended vitamin/mineral supplements. Patient demonstrates understanding.   - Discussed blood sugars and correcting with juice and then some protein and CHO food.   - AVS via [employer]     Expected Engagement: Good     Goals:  1) Follow bariatric low-fat full liquid diet through day 13  post-op, then to progress to pureed diet x 2 weeks (4/19-5/2).  If tolerating, may advance on day 29 post-op to bariatric soft diet (5/3-6/3).   2) Work towards 60 gm protein/day.  3) Consume 48-64 oz fluids daily- between meals.  4) Eat slowly (>20 min/meal), chewing well to smooth consistency once on the bariatric soft diet.  5) Limit portions to 1/2 cup/meal.   6) Start chewable/liquid multivitamin/minerals twice daily.  7) If blood sugars are running low have 4 oz of juice and then when blood sugars return to normal have a few bites of yogurt or sip of protein shake.     Post-op Diet Handouts:  Diet Guidelines after Weight-loss Surgery  [email or web address]     Your Stage 1 Diet: Clear Liquids  [email or web address]     Your Stage 2 Diet: Low-fat Full Liquids (4/12/2021-4/18/2021)  [email or web address]     Your Stage 3 Diet: Pureed Foods (4/19-5/2)  [email or web address]     Pureed Pleasures  [email or web address]    Your Stage 4 Diet: Soft Foods (5/3-6/3)  [email or web address]    Your Stage 5 Diet: Regular Foods  [email or web address]    Supplements after Weight Loss Surgery  [email or web address]     Keeping Track of Fluids  [email or web address]    Exercise Guidelines after Weight Loss Surgery (1st 4-6 weeks)  [email or web address]      Follow-Up: 3 weeks     Time spent with patient: 50 minutes.  Lety Beal, MS, RD, LD

## 2021-04-12 NOTE — TELEPHONE ENCOUNTER
Called to cancel 04/12 [facility] appt only- no r/s needed since pt is seeing Juan Manuel on 04/14. LVM with call center # for questions. Will also send [employer] message.

## 2021-04-12 NOTE — PROGRESS NOTES
"Stephanie Sanches is a 55 year old who is being evaluated via a billable video visit.      The patient has been notified of following:     \"This video visit will be conducted via a call between you and your physician/provider. We have found that certain health care needs can be provided without the need for an in-person physical exam.  This service lets us provide the care you need with a video conversation.  If a prescription is necessary we can send it directly to your pharmacy.  If lab work is needed we can place an order for that and you can then stop by our lab to have the test done at a later time.    Video visits are billed at different rates depending on your insurance coverage.  Please reach out to your insurance provider with any questions.    If during the course of the call the physician/provider feels a video visit is not appropriate, you will not be charged for this service.\"    Patient has given verbal consent for Video visit? Yes  How would you like to obtain your AVS? MyChart  If you are dropped from the video visit, the video invite should be resent to: Text to cell phone: [phone]  Will anyone else be joining your video visit? No        Video-Visit Details    Type of service:  Video Visit    Video Start Time: 3:57 PM  Video End Time: 4:47 PM    Originating Location (pt. Location): Home    Distant Location (provider location):  [facility]     Platform used for Video Visit: [employer]    During this virtual visit the patient is located in [place], patient verifies this as the location during the entirety of this visit.     Nutrition Assessment  Reason For Visit:  Stephanie Sanches is a 55 year old female presenting today for nutrition follow-up, 1 week s/p Sleeve Gastrectomy with Dr Zambrano (4/5/2021).  Patient referred by Dr. Zambrano on April 11, 2021.    Anthropometrics  Initial Consult Weight: 336 lbs  Day of Surgery Weight(4/5/2021): 308.7 lbs  Current " Weight: 300 lbs ( per pt's report  Weight loss: -36 lbs from initial consult; -8.7 lbs from day of surgery    Current Vitamins/Minerals: None ( has equate children's chewable at home)      Nutrition History  Pt reports consuming and tolerating bariatric clear and low-fat full liquid diets. Fluid intake appears adequate, consuming 54-64 oz/day: water, powerade zero, protein water and diluted juice.     Denies and GI upset.    Today is first day of full liquid diet, has some yogurt, cream of wheat and premier protein drink, all sitting fine. Consuming a little more than a 1/4 cup but not able to finish a 1/2 cup per meal.     Blood sugars: She put back on her freestyle won, blood sugars running between 80-90, one episode of low blood sugar, 64. She corrected with 1-2 oz of strait juice.     Nutrition Prescription:  Grams Protein: 60 (minimum)  Amount of Fluid: 48-64 oz      Nutrition Diagnosis  Food and nutrition-related knowledge deficit r/t lack of prior exposure to diet advancements beyond bariatric low-fat full liquid diet aeb pt unable to verbalize understanding of bariatric pureed and soft diets.    Intervention  Intervention At Appointment:  Materials/education provided on bariatric pureed and soft diets, protein intake, fluid intake, eating pace, portion control, avoiding excess sugar and fat, recommended vitamin/mineral supplements. Patient demonstrates understanding.   - Discussed blood sugars and correcting with juice and then some protein and CHO food.   - AVS via [employer]     Expected Engagement: Good     Goals:  1) Follow bariatric low-fat full liquid diet through day 13 post-op, then to progress to pureed diet x 2 weeks (4/19-5/2).  If tolerating, may advance on day 29 post-op to bariatric soft diet (5/3-6/3).   2) Work towards 60 gm protein/day.  3) Consume 48-64 oz fluids daily- between meals.  4) Eat slowly (>20 min/meal), chewing well to smooth consistency once on the bariatric soft diet.  5) Limit  portions to 1/2 cup/meal.   6) Start chewable/liquid multivitamin/minerals twice daily.  7) If blood sugars are running low have 4 oz of juice and then when blood sugars return to normal have a few bites of yogurt or sip of protein shake.     Post-op Diet Handouts:  Diet Guidelines after Weight-loss Surgery  [email or web address]     Your Stage 1 Diet: Clear Liquids  [email or web address]     Your Stage 2 Diet: Low-fat Full Liquids (4/12/2021-4/18/2021)  [email or web address]     Your Stage 3 Diet: Pureed Foods (4/19-5/2)  [email or web address]     Pureed Pleasures  [email or web address]    Your Stage 4 Diet: Soft Foods (5/3-6/3)  [email or web address]    Your Stage 5 Diet: Regular Foods  [email or web address]    Supplements after Weight Loss Surgery  [email or web address]     Keeping Track of Fluids  [email or web address]    Exercise Guidelines after Weight Loss Surgery (1st 4-6 weeks)  [email or web address]      Follow-Up: 3 weeks     Time spent with patient: 50 minutes.  Lety Beal, MS, RD, LD

## 2021-04-12 NOTE — PATIENT INSTRUCTIONS
Goals:  1) Follow bariatric low-fat full liquid diet through day 13 post-op, then to progress to pureed diet x 2 weeks (4/19-5/2).  If tolerating, may advance on day 29 post-op to bariatric soft diet (5/3-6/3).   2) Work towards 60 gm protein/day.  3) Consume 48-64 oz fluids daily- between meals.  4) Eat slowly (>20 min/meal), chewing well to smooth consistency once on the bariatric soft diet.  5) Limit portions to 1/2 cup/meal.   6) Start chewable/liquid multivitamin/minerals twice daily.  7) If blood sugars are running low have 4 oz of juice and then when blood sugars return to normal have a few bites of yogurt or sip of protein shake.     Post-op Diet Handouts:  Diet Guidelines after Weight-loss Surgery  [email or web address]     Your Stage 1 Diet: Clear Liquids  [email or web address]     Your Stage 2 Diet: Low-fat Full Liquids (4/12/2021-4/18/2021)  [email or web address]     Your Stage 3 Diet: Pureed Foods (4/19-5/2)  [email or web address]     Pureed Pleasures  [email or web address]    Your Stage 4 Diet: Soft Foods (5/3-6/3)  [email or web address]    Your Stage 5 Diet: Regular Foods  [email or web address]    Supplements after Weight Loss Surgery  [email or web address]     Keeping Track of Fluids  [email or web address]    Exercise Guidelines after Weight Loss Surgery (1st 4-6 weeks)  [email or web address]

## 2021-04-14 ENCOUNTER — OFFICE VISIT (OUTPATIENT)
Dept: ENDOCRINOLOGY | Facility: CLINIC | Age: 56
End: 2021-04-14
Payer: COMMERCIAL

## 2021-04-14 VITALS
SYSTOLIC BLOOD PRESSURE: 109 MMHG | HEIGHT: 67 IN | TEMPERATURE: 98.2 F | OXYGEN SATURATION: 98 % | DIASTOLIC BLOOD PRESSURE: 72 MMHG | WEIGHT: 293 LBS | BODY MASS INDEX: 45.99 KG/M2 | HEART RATE: 91 BPM

## 2021-04-14 DIAGNOSIS — K75.81 NASH (NONALCOHOLIC STEATOHEPATITIS): Primary | ICD-10-CM

## 2021-04-14 PROCEDURE — 99024 POSTOP FOLLOW-UP VISIT: CPT | Performed by: SURGERY

## 2021-04-14 ASSESSMENT — MIFFLIN-ST. JEOR: SCORE: 1997.51

## 2021-04-14 ASSESSMENT — PAIN SCALES - GENERAL: PAINLEVEL: NO PAIN (0)

## 2021-04-14 NOTE — LETTER
"4/14/2021       RE: Stephanie Sanches  [address] [address]     Dear Colleague,    Thank you for referring your patient, Stephanie Sanches, to the [facility] [place] at [facility]. Please see a copy of my visit note below.      Patient is doing well POD 9 from a VSG and liver biopsy  Tolerating po  + BM  No pain      /72 (BP Location: Left arm, Patient Position: Sitting, Cuff Size: Adult Large)   Pulse 91   Temp 98.2  F (36.8  C) (Oral)   Ht 1.702 m (5' 7\")   Wt 136 kg (299 lb 12.8 oz)   SpO2 98%   BMI 46.96 kg/m      Incision CDI    Liver with 10-20% steatosis and with stage 2 fibrosis  Stomach normal  Can see RUSSELL in 3 weeks  I would like to see her at the 12 months appointment   Should have a liver biopsy at that time.    Again, thank you for allowing me to participate in the care of your patient.      Sincerely,    Martinmaria guadalupe Zambrano MD      "

## 2021-04-14 NOTE — PROGRESS NOTES
"  Patient is doing well POD 9 from a VSG and liver biopsy  Tolerating po  + BM  No pain      /72 (BP Location: Left arm, Patient Position: Sitting, Cuff Size: Adult Large)   Pulse 91   Temp 98.2  F (36.8  C) (Oral)   Ht 1.702 m (5' 7\")   Wt 136 kg (299 lb 12.8 oz)   SpO2 98%   BMI 46.96 kg/m      Incision CDI    Liver with 10-20% steatosis and with stage 2 fibrosis  Stomach normal  Can see RUSSELL in 3 weeks  I would like to see her at the 12 months appointment   Should have a liver biopsy at that time.      "

## 2021-04-14 NOTE — NURSING NOTE
"Chief Complaint   Patient presents with     RECHECK     Post op 1 week.        Vitals:    04/14/21 0821   BP: 109/72   BP Location: Left arm   Patient Position: Sitting   Cuff Size: Adult Large   Pulse: 91   Temp: 98.2  F (36.8  C)   TempSrc: Oral   SpO2: 98%   Weight: 136 kg (299 lb 12.8 oz)   Height: 1.702 m (5' 7\")       Body mass index is 46.96 kg/m .                            WILLIE STRINGER, EMT    "

## 2021-04-20 ENCOUNTER — PATIENT OUTREACH (OUTPATIENT)
Dept: ENDOCRINOLOGY | Facility: CLINIC | Age: 56
End: 2021-04-20

## 2021-04-20 NOTE — PROGRESS NOTES
Patient phone call.  Patient states she is having issues with burping.  Taking in room temperature fluids. Patient tried hot tea but wasn't able to tolerate it.  Encouraged patient to try warm water with lemon or lime.  Patient is taking omeprazole daily.  Has not been taking the Levsin.  Encouraged patient to try adding in the Levsin.  Encouraged patient to thin pureed food a little more.  Patient states left hand incision is open slightly.  Area is dried.  Encouraged patient to keep area clean.  Can cover with a bandaid if needed.  Will call on Thursday with a status check.

## 2021-04-26 ENCOUNTER — TRANSFERRED RECORDS (OUTPATIENT)
Dept: HEALTH INFORMATION MANAGEMENT | Facility: CLINIC | Age: 56
End: 2021-04-26

## 2021-05-01 ENCOUNTER — HEALTH MAINTENANCE LETTER (OUTPATIENT)
Age: 56
End: 2021-05-01

## 2021-05-03 DIAGNOSIS — Z98.84 S/P LAPAROSCOPIC SLEEVE GASTRECTOMY: Primary | ICD-10-CM

## 2021-05-03 DIAGNOSIS — E66.01 MORBID OBESITY (H): ICD-10-CM

## 2021-05-05 NOTE — TELEPHONE ENCOUNTER
OMEPRAZOLE 20MG CAPSULES      Last Written Prescription Date:  4/7/21  Last Fill Quantity: 30,   # refills: 0  Last Office Visit : 4/14/21  Future Office visit:  None scheduled    Routing refill request to provider for review/approval because:  Drug not on surgery refill protocol   Last filled for 30, no refills

## 2021-05-10 NOTE — PROGRESS NOTES
"Stephanie Sanches is a 55 year old female who is being evaluated via a billable video visit.      The patient has been notified of following:     \"This video visit will be conducted via a call between you and your physician/provider. We have found that certain health care needs can be provided without the need for an in-person physical exam.  This service lets us provide the care you need with a video conversation.  If a prescription is necessary we can send it directly to your pharmacy.  If lab work is needed we can place an order for that and you can then stop by our lab to have the test done at a later time.    Video visits are billed at different rates depending on your insurance coverage.  Please reach out to your insurance provider with any questions.    If during the course of the call the physician/provider feels a video visit is not appropriate, you will not be charged for this service.\"    Patient has given verbal consent for Video visit? Yes  How would you like to obtain your AVS? MyChart  Will anyone else be joining your video visit? No        Video-Visit Details    Type of service:  Video Visit    Video Start Time: 12:00 PM  Video End Time: 12:22 PM    Originating Location (pt. Location): Home    Distant Location (provider location):  [facility] [place]     Platform used for Video Visit: [employer]    During this virtual visit the patient is located in [place], patient verifies this as the location during the entirety of this visit.     Nutrition Reassessment  Reason For Visit:  Stephanie Sanches is a 55 year old female presenting today for nutrition follow-up, 1 month s/p Sleeve Gastrectomy with Dr Zambrano (4/5/2021).  Patient referred by Dr. Zambrano on April 11, 2021.    Patient with Co-morbidities of obesity including:  Type II DM yes  Renal Failure no  Sleep apnea no  Hypertension no   Dyslipidemia no  Joint pain no  Back pain no  GERD no      Pt's history is also " significant for NAM, followed by GI      Anthropometrics  Initial Consult Weight: 336 lbs  Day of Surgery Weight(4/5/2021): 308.7 lbs  Current Weight: 290 lbs  Weight loss: 46 lbs from initial consult; 18.7 lbs from day of surgery    Current Vitamins/Minerals: MVI/minerals BID (Equate children's chewable), chewable calcium citrate (500 mg per chew), B12 (1000 mcg/day)     Nutrition History:  Started and tolerating soft diet. Eating 3 meals per day, <1/2 cup size meals - cottage cheese pancakes; pulled pork; mashed potato with pro powder; chili; chicken; Greek yogurt; cottage cheese + peaches. Fluid intake appears adequate, consuming 48-64 oz/day. Taking protein shake daily.        Progress with Previous Goals:  1) Follow bariatric low-fat full liquid diet through day 13 post-op, then to progress to pureed diet x 2 weeks.  If tolerating, may advance on day 29 post-op to bariatric soft diet. Met, continues  2) Work towards 60 gm protein/day. Met, continues  3) Consume 48-64 oz fluids daily- between meals. Met, continues   4) Eat slowly (>20 min/meal), chewing well to smooth consistency once on the bariatric soft diet. Met, continues   5) Limit portions to 1/2 cup/meal. Met, continues   6) Start chewable/liquid multivitamin/minerals twice daily. Met, continues     Physical activity: Walking, chair exercise, doing weighted arm exercise.     Nutrition Prescription:  Grams Protein: 60 (minimum)   Amount of Fluid: 48-64 oz    Nutrition Diagnosis  Previous: Food and nutrition-related knowledge deficit r/t lack of prior exposure to diet advancements beyond bariatric low-fat full liquid diet aeb pt unable to verbalize understanding of bariatric pureed and soft diets.    Current: Food and nutrition-related knowledge deficit r/t lack of prior exposure to diet advancements beyond bariatric pureed diet aeb pt unable to verbalize full understanding of bariatric soft and regular consistency  diets.    Intervention  Materials/Education provided on bariatric soft and regular consistency diets, protein intake, fluid intake, eating pace, chewing foods well, portion control, sugar/fat intake, recommended vitamin/mineral supplements. Patient demonstrates understanding.       Expected Engagement: Good    Goals:  1) Follow soft diet for 4 weeks, then to progress to bariatric regular diet (6/4).   2) Consume 60 grams of protein/day.  3) Sip on 48-64 oz of fluids/day- between meals only.  4) Eat slowly (>20 min/meal), chewing foods well (to applesauce-like consistency).  5) Limit portions to 1/2 cup/meal.  6) Take the following after a Sleeve Gastrectomy:    Multivitamin/minerals: adult dose 2 times daily    Iron: 45-60 mg elemental (18-36 mg if low risk) - may partly or fully be covered in multivitamin     Calcium Citrate containing vitamin D: 500 mg 3 times daily or 600 mg 2 times daily   Vitamin D3: 3000 units per day (total between all supplements)    Vitamin B12: sublingual form of at least 500 mcg daily or injection of 1000 mcg monthly     B-50 Complex once daily (can hold off on this until 3 months)    Post-op Diet Handouts:  Diet Guidelines after Weight-loss Surgery  [email or web address]     Your Stage 4 Diet: Soft Foods  [email or web address]    Your Stage 5 Diet: Regular Foods  [email or web address]    Supplements after Weight Loss Surgery  [email or web address]     Keeping Track of Fluids  [email or web address]    Exercises after Weight Loss Surgery (strengthening, when no weight lifting restrictions)  [email or web address]         Follow-Up: 2 months, prn    Time spent with patient: 22 minutes.  Mireya Lou RD LD

## 2021-05-11 ENCOUNTER — TRANSFERRED RECORDS (OUTPATIENT)
Dept: HEALTH INFORMATION MANAGEMENT | Facility: CLINIC | Age: 56
End: 2021-05-11

## 2021-05-11 ENCOUNTER — VIRTUAL VISIT (OUTPATIENT)
Dept: ENDOCRINOLOGY | Facility: CLINIC | Age: 56
End: 2021-05-11
Payer: COMMERCIAL

## 2021-05-11 DIAGNOSIS — Z98.84 S/P LAPAROSCOPIC SLEEVE GASTRECTOMY: ICD-10-CM

## 2021-05-11 DIAGNOSIS — E66.9 OBESITY: ICD-10-CM

## 2021-05-11 DIAGNOSIS — Z71.3 NUTRITIONAL COUNSELING: Primary | ICD-10-CM

## 2021-05-11 LAB
ALT SERPL-CCNC: 19 IU/L (ref 6–31)
AST SERPL-CCNC: 22 IU/L (ref 10–40)
CHOLEST SERPL-MCNC: 164 MG/DL (ref 114–200)
CREAT SERPL-MCNC: 0.78 MG/DL (ref 0.4–1)
GFR SERPL CREATININE-BSD FRML MDRD: >60 ML/MIN/1.73M2
GLUCOSE SERPL-MCNC: 126 MG/DL (ref 70–99)
HBA1C MFR BLD: 5.8 % (ref 4–5.6)
HDLC SERPL-MCNC: 50 MG/DL (ref 40–60)
LDLC SERPL CALC-MCNC: 87 MG/DL
POTASSIUM SERPL-SCNC: 3.9 MEQ/L (ref 3.4–5.1)
TRIGL SERPL-MCNC: 133 MG/DL (ref 10–200)
TSH SERPL-ACNC: 3.02 UIU/ML (ref 0.4–3.99)

## 2021-05-11 PROCEDURE — 97803 MED NUTRITION INDIV SUBSEQ: CPT | Mod: GT | Performed by: DIETITIAN, REGISTERED

## 2021-05-11 NOTE — LETTER
"5/11/2021       RE: Stephanie Sanches  [address] [address]     Dear Colleague,    Thank you for referring your patient, Stephanie Sanches, to the [facility] [place] at [facility]. Please see a copy of my visit note below.    Stephanie Sanches is a 55 year old female who is being evaluated via a billable video visit.      The patient has been notified of following:     \"This video visit will be conducted via a call between you and your physician/provider. We have found that certain health care needs can be provided without the need for an in-person physical exam.  This service lets us provide the care you need with a video conversation.  If a prescription is necessary we can send it directly to your pharmacy.  If lab work is needed we can place an order for that and you can then stop by our lab to have the test done at a later time.    Video visits are billed at different rates depending on your insurance coverage.  Please reach out to your insurance provider with any questions.    If during the course of the call the physician/provider feels a video visit is not appropriate, you will not be charged for this service.\"    Patient has given verbal consent for Video visit? Yes  How would you like to obtain your AVS? MyChart  Will anyone else be joining your video visit? No        Video-Visit Details    Type of service:  Video Visit    Video Start Time: 12:00 PM  Video End Time: 12:22 PM    Originating Location (pt. Location): Home    Distant Location (provider location):  [facility]     Platform used for Video Visit: [employer]    During this virtual visit the patient is located in [place], patient verifies this as the location during the entirety of this visit.     Nutrition Reassessment  Reason For Visit:  Stephanie Sanches is a 55 year old female presenting today for nutrition " follow-up, 1 month s/p Sleeve Gastrectomy with Dr Zambrano (4/5/2021).  Patient referred by Dr. Zambrano on April 11, 2021.    Patient with Co-morbidities of obesity including:  Type II DM yes  Renal Failure no  Sleep apnea no  Hypertension no   Dyslipidemia no  Joint pain no  Back pain no  GERD no      Pt's history is also significant for NAM, followed by GI      Anthropometrics  Initial Consult Weight: 336 lbs  Day of Surgery Weight(4/5/2021): 308.7 lbs  Current Weight: 290 lbs  Weight loss: 46 lbs from initial consult; 18.7 lbs from day of surgery    Current Vitamins/Minerals: MVI/minerals BID (Equate children's chewable), chewable calcium citrate (500 mg per chew), B12 (1000 mcg/day)     Nutrition History:  Started and tolerating soft diet. Eating 3 meals per day, <1/2 cup size meals - cottage cheese pancakes; pulled pork; mashed potato with pro powder; chili; chicken; Greek yogurt; cottage cheese + peaches. Fluid intake appears adequate, consuming 48-64 oz/day. Taking protein shake daily.        Progress with Previous Goals:  1) Follow bariatric low-fat full liquid diet through day 13 post-op, then to progress to pureed diet x 2 weeks.  If tolerating, may advance on day 29 post-op to bariatric soft diet. Met, continues  2) Work towards 60 gm protein/day. Met, continues  3) Consume 48-64 oz fluids daily- between meals. Met, continues   4) Eat slowly (>20 min/meal), chewing well to smooth consistency once on the bariatric soft diet. Met, continues   5) Limit portions to 1/2 cup/meal. Met, continues   6) Start chewable/liquid multivitamin/minerals twice daily. Met, continues     Physical activity: Walking, chair exercise, doing weighted arm exercise.     Nutrition Prescription:  Grams Protein: 60 (minimum)   Amount of Fluid: 48-64 oz    Nutrition Diagnosis  Previous: Food and nutrition-related knowledge deficit r/t lack of prior exposure to diet advancements beyond bariatric low-fat full liquid diet aeb pt  unable to verbalize understanding of bariatric pureed and soft diets.    Current: Food and nutrition-related knowledge deficit r/t lack of prior exposure to diet advancements beyond bariatric pureed diet aeb pt unable to verbalize full understanding of bariatric soft and regular consistency diets.    Intervention  Materials/Education provided on bariatric soft and regular consistency diets, protein intake, fluid intake, eating pace, chewing foods well, portion control, sugar/fat intake, recommended vitamin/mineral supplements. Patient demonstrates understanding.       Expected Engagement: Good    Goals:  1) Follow soft diet for 4 weeks, then to progress to bariatric regular diet (6/4).   2) Consume 60 grams of protein/day.  3) Sip on 48-64 oz of fluids/day- between meals only.  4) Eat slowly (>20 min/meal), chewing foods well (to applesauce-like consistency).  5) Limit portions to 1/2 cup/meal.  6) Take the following after a Sleeve Gastrectomy:    Multivitamin/minerals: adult dose 2 times daily    Iron: 45-60 mg elemental (18-36 mg if low risk) - may partly or fully be covered in multivitamin     Calcium Citrate containing vitamin D: 500 mg 3 times daily or 600 mg 2 times daily   Vitamin D3: 3000 units per day (total between all supplements)    Vitamin B12: sublingual form of at least 500 mcg daily or injection of 1000 mcg monthly     B-50 Complex once daily (can hold off on this until 3 months)    Post-op Diet Handouts:  Diet Guidelines after Weight-loss Surgery  [email or web address]     Your Stage 4 Diet: Soft Foods  [email or web address]    Your Stage 5 Diet: Regular Foods  [email or web address]    Supplements after Weight Loss Surgery  [email or web address]     Keeping Track of Fluids  [email or web address]    Exercises after Weight Loss Surgery (strengthening, when no weight lifting restrictions)  [email or web address]         Follow-Up: 2 months,  prn    Time spent with patient: 22 minutes.  Mireya Lou RD LD

## 2021-05-11 NOTE — PATIENT INSTRUCTIONS
Rickey Carrillo,    Follow-up with RD in 2 months.     Thank you,    Mireya Lou, RD, LD  If you would like to schedule or reschedule an appointment with the RD, please call [phone]    Nutrition Goals  1) Follow soft diet for 4 weeks, then to progress to bariatric regular diet (6/4).   2) Consume 60 grams of protein/day.  3) Sip on 48-64 oz of fluids/day- between meals only.  4) Eat slowly (>20 min/meal), chewing foods well (to applesauce-like consistency).  5) Limit portions to 1/2 cup/meal.  6) Take the following after a Sleeve Gastrectomy:    Multivitamin/minerals: adult dose 2 times daily    Iron: 45-60 mg elemental (18-36 mg if low risk) - may partly or fully be covered in multivitamin     Calcium Citrate containing vitamin D: 500 mg 3 times daily or 600 mg 2 times daily  Vitamin D3: 3000 units per day (total between all supplements)    Vitamin B12: sublingual form of at least 500 mcg daily or injection of 1000 mcg monthly     B-50 Complex once daily (can hold off on this until 3 months)    Post-op Diet Handouts:  Diet Guidelines after Weight-loss Surgery  [email or web address]     Your Stage 4 Diet: Soft Foods  [email or web address]    Your Stage 5 Diet: Regular Foods  [email or web address]    Supplements after Weight Loss Surgery  [email or web address]     Keeping Track of Fluids  [email or web address]    Exercises after Weight Loss Surgery (strengthening, when no weight lifting restrictions)  [email or web address]      Interested in working with a health ?  Health coaches work with you to improve your overall health and wellbeing.  They look at the whole person, and may involve discussion of different areas of life, including, but not limited to the four pillars of health (sleep, exercise, nutrition, and stress management). Discuss with your care team if you would like to start working a health .    Health Coaching-3 Pack:    $99 for three health  coaching visits    Visits may be done in person or via phone    Coaching is a partnership between the  and the client; Coaches do not prescribe or diagnose    Coaching helps inspire the client to reach his/her personal goals      Virtual Support Groups are Available             Healthy Lifestyle Support Group      All are welcome!     Facilitator: Martha Coates, Certified Health     - Meets once a month on a Friday from 12:30pm to 1:30pm.  - Due to Covid-19 she is doing this Support Group virtually using Microsoft Teams.  - 60 minutes of small group guided discussion, support and resources.  - Please email Martha directly at [email or web address] to receive monthly invitations.  - If you opt to participate in individual health coaching sessions or for general questions, contact Martha via email.  - Martha will send out invites for each session, so the phone number and the conference ID may change for each session.     2020 Meetings      December 18 - Open Forum      2021 Meetings      January 29 - How to Stay Active and Healthy during the Winter Months   February 26  - Reading Food Labels: What do I Need to Know?, Guest Speaker: Mireya Lou RD   March 19  - Finding Health, Happiness and Confidence at Every Size; Guest Speaker, Health Psychologist Fellow  April 30 -  Healthy Eating on a Budget, Guest Speaker: Lety Beal RD   May 21 - Open Forum

## 2021-05-18 ENCOUNTER — VIRTUAL VISIT (OUTPATIENT)
Dept: ENDOCRINOLOGY | Facility: CLINIC | Age: 56
End: 2021-05-18
Payer: COMMERCIAL

## 2021-05-18 VITALS — WEIGHT: 285 LBS | BODY MASS INDEX: 43.19 KG/M2 | HEIGHT: 68 IN

## 2021-05-18 DIAGNOSIS — Z98.84 S/P LAPAROSCOPIC SLEEVE GASTRECTOMY: Primary | ICD-10-CM

## 2021-05-18 DIAGNOSIS — E66.01 MORBID OBESITY (H): ICD-10-CM

## 2021-05-18 PROCEDURE — 99024 POSTOP FOLLOW-UP VISIT: CPT | Mod: GT | Performed by: NURSE PRACTITIONER

## 2021-05-18 ASSESSMENT — MIFFLIN-ST. JEOR: SCORE: 1938.31

## 2021-05-18 ASSESSMENT — PAIN SCALES - GENERAL: PAINLEVEL: NO PAIN (0)

## 2021-05-18 NOTE — NURSING NOTE
"Chief Complaint   Patient presents with     Follow Up     1 Month Follow-up Laparscopic Gastric Sleeve Surgery       Vitals:    05/18/21 1752   Weight: 129.3 kg (285 lb)   Height: 1.715 m (5' 7.5\")       Body mass index is 43.98 kg/m .                            "

## 2021-05-18 NOTE — PROGRESS NOTES
"Lorena is a 55 year old who is being evaluated via a billable video visit.      How would you like to obtain your AVS? MyChart  If the video visit is dropped, the invitation should be resent by: Text to cell phone: [phone]  Will anyone else be joining your video visit? No      Video Start Time: 1833  Video-Visit Details    Type of service:  Video Visit    Video End Time:1703    Originating Location (pt. Location): Home    Distant Location (provider location):  [facility] [place]     Platform used for Video Visit: [facility]     Postoperative bariatric surgery visit.    Patient underwent sleeve gastrectomy 6 weeks ago.  Juan Manuel 4/5/2021   Saw Dr. WYLIE 1 week post op     Tolerating liquids: 48oz minimum. Feels she can drink better out of a straw (hydroflask). Up to 64oz daily   Lightheadedness: none  Abdominal pain: none   Bowel movements: some constipation, using smooth move tea,   Fevers/shakes/chills: none  GERD: none Taking omeprazole once daily   Leg/calf pain: none     Only one episode of food not agreeing with her      Blood sugars are good- off metformin   Hunger/ cravings well controlled     FINAL DIAGNOSIS:   A. Stomach, Vertical Sleeve Partial Gastrectomy:   Portion of gastric wall with no inflammation, intestinal metaplasia, or   other significant abnormality     B. Liver, Needle Biopsy:   Steatohepatitis with mild steatosis and moderate fibrosis:    -Approximately 10-20% steatosis    -CRN fibrosis stage 3 /4 (equivalent to Laennec stage 2-3 /4)     How many opioid pain medications used after surgery?  What did you do with extra pills?  Were any opioid pain medication refills provided after surgery?  Were any opioid pain medications needed after 30 days postop?    Ht 1.715 m (5' 7.5\")   Wt 129.3 kg (285 lb)   BMI 43.98 kg/m     Wt Readings from Last 5 Encounters:   05/18/21 129.3 kg (285 lb)   04/14/21 136 kg (299 lb 12.8 oz)   04/05/21 147 kg (324 lb 1.2 oz)   03/16/21 " Consult Note  Interventional Radiology      Reason for Consult: Evaluate KATELYN drain    Inpatient consult to Interventional Radiology  Consult performed by: Francie Stephens DNP  Consult ordered by: Daysi Singh NP          SUBJECTIVE:     Chief Complaint: intermittent abdominal pain and nausea     History of Present Illness: 71 year old gentleman with CAD sp PCI last being in 2007, with longstanding hx of HTN DM2, HAMMER sp transplant in 2015, now on steroid induction and tacro/ped, Burkitts like lymphoma, and indolent bowel perforation, for which he underwent Juan's procedure with rsx/ostomy creation in February of this year. There was subsequent gross contamination with a fistual noted. Pt underwent elective colostomy reversal 8/29/2018 and was found to have anastomotic leak, failed corrective enteric stent, and ultimately required ex-lap. Drainage catheter placed/changed in September for peritoneal abscess noted on CT. ID following. Patient continues to have nausea and abdominal pain, although endorses improvement in pain, and reports decrease in fluid collection in KATELYN drain.     Past Medical History:   Diagnosis Date    Abdominal wall abscess 4/6/2018    JEREMIAS (acute kidney injury) 10/9/2017    Ascites 10/10/2017    CAD (coronary artery disease), native coronary artery     2 stents performed  2001 & 2007    Cancer 2017    lymphoma    Deep vein thrombosis     Diabetes mellitus     Diagnosed 2003    Diabetes mellitus, type 2     Diastolic dysfunction     Fatty liver disease, nonalcoholic     Hypertension     Intra-abdominal abscess 2/16/2018    Liver cirrhosis secondary to HAMMER 1/2/2016    Liver transplant recipient 12/30/15    Obesity     AIDE (obstructive sleep apnea)     Severe sepsis 10/29/2017    Thyroid disease     Hypothyroid diagnosed 2011     Active Hospital Problems    Diagnosis  POA    *Peritoneal abscess [K65.1]  Yes    Intestinal anastomotic leak [K91.89]  Yes     145.2 kg (320 lb)   02/17/21 147.4 kg (325 lb)      NAD  Overall looks great  Incisions c/d/i; non-tender     Plan:  1. RD visit today.  2. Start vitamin supplements per RD directions.  3. Advance diet per RD directions.  4. Follow-up:2 months with labs   5. Actigall prescription not indicated   6. B12 SL  7. Pathology reviewed normal   8. Weight loss medications: metformin stopped preop     Fax labs to [facility] [place]      Clostridium difficile infection [B96.89]  Yes    GI bleed [K92.2]  Yes    Recipient of liver from HBcAb+ donor [Z00.5, B19.10]  Not Applicable     Chronic     **Donor HBcAb neg, but Hep B MONSERRAT positive** (original testing at time of organ offer)  Donor HBVDNA neg ; Donor core M neg ; Donor core IgG neg ([facility] confirmatory testing)      Atrial fibrillation [I48.91]  Yes    CKD (chronic kidney disease) stage 3, GFR 30-59 ml/min [N18.3]  Yes    Obesity (BMI 30-39.9) [E66.9]  Yes    Diabetic peripheral neuropathy associated with type 2 diabetes mellitus [E11.42]  Yes       On treatment with  Insulin    Hemoglobin A1c- 6/22//2018 - 4.9  Capillary glucose check-yes  Pre breakfast -115-120  Pre lunch -140's  Pre supper-160-180        HAMMER Cirrhosis s/p liver transplant [Z94.4]  Not Applicable    Obstructive sleep apnea [G47.33]  Yes    HTN (hypertension) [I10]  Yes     Lisinopril , Metoprolol   Usual /60's      Type 2 diabetes mellitus [E11.9]  Yes      Resolved Hospital Problems   No resolved problems to display.     Past Surgical History:   Procedure Laterality Date    BIOPSY-BONE MARROW Left 6/7/2018    Performed by Gael Montez MD at [facility] OR [address] [place] [address]    BONE MARROW BIOPSY Left 6/7/2018    Procedure: BIOPSY-BONE MARROW;  Surgeon: Gael Montez MD;  Location: [facility] OR [address] [place] [address];  Service: Oncology;  Laterality: Left;    CARPAL TUNNEL RELEASE  2006    CATARACT EXTRACTION, BILATERAL  2006    CLOSURE,COLOSTOMY N/A 8/27/2018    Performed by Marin Flores MD at [facility] OR [address] [place] [address]    COLONOSCOPY N/A 11/6/2017    Procedure: COLONOSCOPY, possible rubber band ligation;  Surgeon: Marin Ron MD;  Location: [facility] ([address] [place] [address]);  Service: Endoscopy;  Laterality: N/A;    COLONOSCOPY N/A 9/19/2018    Procedure: COLONOSCOPY with stent;  Surgeon: Marin Flores MD;  Location: [facility] ([address] [place] [address]);  Service: Endoscopy;  Laterality: N/A;    COLONOSCOPY N/A 9/18/2018    Procedure: COLONOSCOPY;   Surgeon: Marin Flores MD;  Location: [facility] ([address] [place] [address]);  Service: Endoscopy;  Laterality: N/A;  with poss colonic stent    COLONOSCOPY N/A 9/18/2018    Performed by Marin Flores MD at [facility] (2ND FLR)    COLONOSCOPY with stent N/A 9/19/2018    [facility]    COLONOSCOPY, possible rubber band ligation N/A 11/6/2017    [facility]    CORONARY STENT PLACEMENT  01/01/1998    second stent placement 2002    CREATION, ILEOSTOMY  Creation of loop ileostomy. N/A 9/24/2018    Performed by Marin Ron MD at [facility] OR [address] [place] [address]    CYSTOSCOPY W/ RETROGRADES N/A 8/31/2018    Procedure: CYSTOSCOPY, WITH RETROGRADE PYELOGRAM;  Surgeon: Ty Amin MD;  Location: [facility] OR [address] [place] [address];  Service: Urology;  Laterality: N/A;    CYSTOSCOPY, WITH RETROGRADE PYELOGRAM N/A 8/31/2018    Performed by Ty Amin MD at [facility] OR [address] [place] [address]    ESOPHAGOGASTRODUODENOSCOPY (EGD) N/A 11/7/2017    Per[facility] by [facility] C [facility]coll [facility]    EXPLORATORY-LAPAROTOMY, [place] N/A 2/20/2018    Performed by Marin Flores MD at [facility] OR [address] [place] [address]    HEMORRHOID SURGERY  1995    HERNIA REPAIR  1965    HERNIA REPAIR  1969    ILEOCECECTOMY  2/20/2018    Performed by Marin Flores MD at [facility] OR [address] [place] [address]    ILEOSTOMY N/A 9/24/2018    Procedure: CREATION, ILEOSTOMY  Creation of loop ileostomy.;  Surgeon: Marin Ron MD;  Location: [facility] OR [address] [place] [address];  Service: Colon and Rectal;  Laterality: N/A;    KNEE ARTHROSCOPY W/ ARTHROTOMY  1999    LEFT     KNEE ARTHROSCOPY W/ ARTHROTOMY  2010    RIGHT    left heart cath  2001    stent placement    left heart cath  2007    1 stent placed.     LIVER TRANSPLANT  12/30/15    LYSIS OF ADHESIONS N/A 9/24/2018    Procedure: LYSIS, ADHESIONS;  Surgeon: Marin Ron MD;  Location: [facility] OR [address] [place] [address];  Service: Colon and Rectal;  Laterality: N/A;    LYSIS, ADHESIONS N/A 9/24/2018    Performed  by [facility] at [facility] OR 2ND FLR    MOBILIZATION-SPLENIC FLEXURE  2018    Performed by Marin [facility] at [facility] OR 2ND FLR    TRANSPLANT-LIVER N/A 2015    Performed by Adriel Cage MD at [facility] OR 2ND FLR     Family History   Problem Relation Age of Onset    Thyroid disease Sister     Cancer Sister         esophagus    Heart attack Father     Heart failure Father     Hypertension Father     Hyperlipidemia Father     Cancer Mother 76        lung CA - 2nd hand smoking    Diabetes Maternal Aunt     Diabetes Maternal Uncle     Diabetes Paternal Aunt     Diabetes Paternal Uncle     Thyroid disease Maternal Aunt     Esophageal cancer Sister     Anesthesia problems Neg Hx      Social History     Tobacco Use    Smoking status: Former Smoker     Years: 2.00     Types: Pipe, Cigars     Last attempt to quit: 1971     Years since quittin.9    Smokeless tobacco: Never Used    Tobacco comment: 2-3 pipes a day, 5 cigar's a week.   Substance Use Topics    Alcohol use: No     Alcohol/week: 0.0 oz    Drug use: No       Review of patient's allergies indicates:   Allergen Reactions    Bactrim [sulfamethoxazole-trimethoprim]      Red rash    Lipitor [atorvastatin] Diarrhea    Metformin Diarrhea    Fenofibrate      Stomach ache    Januvia [sitagliptin] Other (See Comments)    Levaquin [levofloxacin]      Has received cipro without any issues    Sulfa (sulfonamide antibiotics) Hives    Crestor [rosuvastatin] Other (See Comments)     myalgia        Review of Systems:  Constitutional/General:No fever, chills, change in appetite or weight loss.  Hematological/Immuno: positive for - bruising, fatigue and weight loss  no known coagulopathies  Respiratory: negative for - pleuritic pain and shortness of breath  no shortness of breath  Cardiovascular: no chest pain or dyspnea on exertion  no chest pain  Gastrointestinal: positive for - midline/inscional abdominal pain,  gas/bloating and nausea/vomiting    Genito-Urinary: negative for - change in urinary stream or dysuria  no dysuria  Musculoskeletal: negative  negative for - joint stiffness or muscle pain  Neurological: no TIA or stroke symptoms  Psychiatric: normal mood/affect, good insight/judgement    OBJECTIVE:     Vital Signs Range (Last 24H):  Temp:  [97.6 °F (36.4 °C)-99.8 °F (37.7 °C)]   Pulse:  [70-85]   Resp:  [16-20]   BP: (104-110)/(55-62)   SpO2:  [93 %-97 %]       Physical Exam  Body mass index is 36.59 kg/m².   Physical Exam:  General- Patient alert and oriented x3 in NAD  Eyes-EOM are normal Pupils are equal round and reactive to light  Neck- No JVD, Lymphadenopathy, Thyromegaly  CV- abnormal rate with systolic murmur (III/IV)  Resp- Lungs CTA Bilaterally, No increased WOB  GI- Tender, obese abdoma, BS normoactive x4 quads, no HSM  Extrem- 1+ lower extremity edema; skin warm to touch   Derm- Large abdominal incision noted with significant crusting, edges are well approximated, raised and pink at borders; Ostomy bag noted and skin CDI around. Notable bruising along upper extremities.  Neuro- Sensation intact to light touch . No focal deficits noted.     Lower right quadrant abdominal drain noted. Well ecured with gauze and Tegaderm with small amount of serous/serosanguinous drainage within KATELYN tubing and bulb. No skin breakdown noted.    Laboratory:    Recent Labs   Lab  10/03/18   0351   WBC  1.56*   RBC  2.45*   HGB  7.5*   HCT  23.4*   PLT  116*   MCV  96   MCH  30.6   MCHC  32.1     BMP:   Recent Labs   Lab  10/03/18   0351   GLU  82   NA  138   K  4.2   CL  99   CO2  30*   BUN  31*   CREATININE  1.0   CALCIUM  8.2*   MG  1.6     CMP:   Recent Labs   Lab  10/03/18   0351   GLU  82   CALCIUM  8.2*   ALBUMIN  2.3*   PROT  4.7*   NA  138   K  4.2   CO2  30*   CL  99   BUN  31*   CREATININE  1.0   ALKPHOS  172*   ALT  45*   AST  56*   BILITOT  0.7     LFTs:   Recent Labs   Lab  10/03/18   0351   ALT  45*   AST  56*    ALKPHOS  172*   BILITOT  0.7   PROT  4.7*   ALBUMIN  2.3*     Coagulation:   Recent Labs   Lab  10/03/18   0351   LABPROT  10.5   INR  1.0       Diagnostic Results:  CT abdomen/pelvis reviewed: There is a percutaneous pigtail drainage catheter entering the right lower quadrant, terminating in a known fluid and air collection within the pelvis. Overall, this collection appears improved, now somewhat irregular in its configuration about bowel loops in the pelvis, measuring 6.1 x 6.5 x 4.4 cm (previously 6.4 x 18.0 x 5.5 cm) in its greatest dimension.  This appears contiguous with inflammatory changes tracking to the site of a previously identified defect involving the sigmoid anastomosis.  These inflammatory changes and the sigmoid colon anastomotic site are closely apposed to the urinary bladder.       ASSESSMENT/PLAN:     71 year old gentleman with multiple comorbidities sp Juan's procedure with rsx/ostomy creation in February of this yearand subsequent gross contamination with a fistula, who underwent elective colostomy reversal 8/29/2018 and was found to have anastomotic leak, failed corrective enteric stent, and ultimately required ex-lap. Previously placed drainage catheter shows decreased drainage related to resolution of fluid collection. CT imaging demonstrates collection appears improved. Patient endorses improvement in right lower quadrant abdominal pain. Given improving clinical picture, change/upsizing/re-positioning of drainage catheter not recommended at this time. Drain output likely decreased in setting of improving and diminishing collection. Please re-consult should abdominal distention, pain, or new imaging reveal re-accumulation.This case was discussed with attending physician Dr. Doc Mullins who has reviewed imaging and agrees with above plan.    Thank you for this consult.     Francie [place] DNP, FNP-BC  Nurse Practitioner  Interventional Radiology

## 2021-05-18 NOTE — LETTER
5/18/2021       RE: Stephanie Sanches  [address] [address]     Dear Colleague,    Thank you for referring your patient, Stephanie Sanches, to the [facility] [place] at [facility]. Please see a copy of my visit note below.    Lorena is a 55 year old who is being evaluated via a billable video visit.      How would you like to obtain your AVS? MyChart  If the video visit is dropped, the invitation should be resent by: Text to cell phone: [phone]  Will anyone else be joining your video visit? No      Video Start Time: 1833  Video-Visit Details    Type of service:  Video Visit    Video End Time:1703    Originating Location (pt. Location): Home    Distant Location (provider location):  [facility] [place]     Platform used for Video Visit: [employer]     Postoperative bariatric surgery visit.    Patient underwent sleeve gastrectomy 6 weeks ago.  Juan Manuel 4/5/2021   Saw Dr. WYLIE 1 week post op     Tolerating liquids: 48oz minimum. Feels she can drink better out of a straw (hydroflask). Up to 64oz daily   Lightheadedness: none  Abdominal pain: none   Bowel movements: some constipation, using smooth move tea,   Fevers/shakes/chills: none  GERD: none Taking omeprazole once daily   Leg/calf pain: none     Only one episode of food not agreeing with her      Blood sugars are good- off metformin   Hunger/ cravings well controlled     FINAL DIAGNOSIS:   A. Stomach, Vertical Sleeve Partial Gastrectomy:   Portion of gastric wall with no inflammation, intestinal metaplasia, or   other significant abnormality     B. Liver, Needle Biopsy:   Steatohepatitis with mild steatosis and moderate fibrosis:    -Approximately 10-20% steatosis    -CRN fibrosis stage 3 /4 (equivalent to Laennec stage 2-3 /4)     How many opioid pain medications used after surgery?  What did you do with extra pills?  Were any  "opioid pain medication refills provided after surgery?  Were any opioid pain medications needed after 30 days postop?    Ht 1.715 m (5' 7.5\")   Wt 129.3 kg (285 lb)   BMI 43.98 kg/m     Wt Readings from Last 5 Encounters:   05/18/21 129.3 kg (285 lb)   04/14/21 136 kg (299 lb 12.8 oz)   04/05/21 147 kg (324 lb 1.2 oz)   03/16/21 145.2 kg (320 lb)   02/17/21 147.4 kg (325 lb)      NAD  Overall looks great  Incisions c/d/i; non-tender     Plan:  1. RD visit today.  2. Start vitamin supplements per RD directions.  3. Advance diet per RD directions.  4. Follow-up:2 months with labs   5. Actigall prescription not indicated   6. B12 SL  7. Pathology reviewed normal   8. Weight loss medications: metformin stopped preop     Fax labs to [facility] superior Farida Seymour CNP  [facility] [employer]       "

## 2021-05-18 NOTE — PATIENT INSTRUCTIONS
"It was a pleasure meeting with you today.    Thank you for allowing us the privilege of caring for you. We hope we provided you with the excellent service you deserve.   Please let us know if there is anything else we can do for you so that we can be sure you are leaving completely satisfied with your care experience.    To ensure the quality of our services you may be receiving a patient satisfaction survey from an independent patient satisfaction monitoring company.    The greatest compliment you can give is a \"Likely to Recommend\"      Your visit was with Farida Seymour NP today.     Instructions per today's visit:     -okay to decrease the omeprazole to every other day, then every couple of days   -okay to take smooth move tea as needed for constipation   -could try benefiber (slow taper up ) for constipation       To schedule appointments with our team, please call [phone] option #1    Please call during clinic hours Monday through Friday 8:00a - 4:00p if you have questions or you can contact us via [employer] at anytime.      Nurses: [phone]  Fax: [phone]  Surgery Scheduler: [phone]    Please call the hospital at [phone] to speak with our on call MDs if you have urgent needs after hours, during weekends, or holidays.    **Please note if you need to go to the Emergency Room for any reason in the first 90 days after surgery it is important to go to the [facility] ER on the [place] on [address] off of the [address] off [address]    *For patients who are currently enrolled in our program and have not yet had weight loss surgery please note*:    You must be at your required goal weight at the time of your Anesthesia clinic visit as well as the day of surgery or your surgery will be canceled. You will not be able to obtain a new surgery date until you have met your goal weight.    Lab results will be communicated through My Chart or letter (if My Chart not used). Please call the " clinic if you have not received communication after 1 week or if you have any questions.?    Main follow-up parameters for all bariatric patients     Patients who have undergone Bariatric surgery:    1. Follow-up interval during the first year: ~1 week, 1 month, 3 month, 6 month,12 months.  Every 6 months until 5 years; and then annually thereafter.  The goal of follow-up sessions is to ensure that food intake behavior (choice of food and eating speed) and exercise/activity level are set appropriately.    2. Yearly lab tests ordered by our clinic.      3. The bariatric team should be aware and potentially evaluate all adverse gastrointestinal symptoms (dysphagia, abdominal pain, nausea, vomiting, diarrhea, heartburn, reflux, etc), which can be a sign of complication.  The bariatric surgeons perform general surgery procedures in addition to bariatric surgery (laparoscopic cholecystectomy, etc.)    4. Inability to tolerate textured food (chicken, steak, fish, eggs, beans) is NOT normal and may need to be evaluated by endoscopy performed by the bariatric surgeon.    _____________________________________________________________________________________________________________________________    Meal Replacement Products:    Here is the link to our new e-store where you can purchase our meal replacement products    [facility] Munch a Bunch-Store  [email or web address]    The one week starter kit is a great way to sample a variety of products and see what works for you.    If you want more information about the product go to: [employer]vil    Free Shipping for orders over $75     Benefits of meal replacements products:    Portion and calorie control  Improved nutrition  Structured eating  Simplified food choices  Avoid contact with trigger foods  ______________________________________________________________________________________________________________________________    Healthy Lifestyle Support  Group   [facility]  Virtual Support Group Now Available    60 minutes of small group guided discussion, support and resources    Led by Health , Martha Coates    For questions, and to receive the invite information, contact Martha at [email or web address]    All our welcome!    One Friday per month, 12:30pm to 1:30pm  SELF COMPASSION: July 31st  CREATING MY WELLNESS VISION: August 28th  MINDFULNESS PRACTICES FOR A CALM BODY & MIND: September 25th  MINDFUL EATING TOOLS: October 30th  HEALTHY& HAPPY HOLIDAYS: November 20th  OPEN FORUM: December 18th  _______________________________________________________________________________________________________________________________    Connections: Bariatric Care support group  Due to the Covid-19 restrictions, we will be doing our support group virtually using Microsoft Teams. You will need to get an invitation to the group from Michael Carias, Ph.D., LP at [email or web address] and to learn about using Microsoft Teams. The next meeting is on Tuesday, July 14 between 6:30 and 8 PM and there will be no formal speaker.    This group meets the second Tuesday of each month from 6:30 to 8 PM and will be held again at [facility] in the [address] [place] [address] (A-B room) when the Covid-19 restrictions are lifted. The group is led by Michael Carias, Ph.D., Licensed Psychologist, [facility]. There is no cost for group participation and is open to all [facility] (and those external to this program) pre- and post-operative bariatric surgery patients as well as their support system.   _________________________________________________________________________________________________________________________________      Interested in working with a health ?  Health coaches work with you to improve your overall health and wellbeing.  They look at the whole person, and may  involve discussion of different areas of life, including, but not limited to the four pillars of health (sleep, exercise, nutrition, and stress management). Discuss with your care team if you would like to start working a health .     Health Coaching-3 Pack:      $99 for three health coaching visits    Visits may be done in person or via phone    Coaching is a partnership between the  and the client; Coaches do not prescribe or diagnose    Coaching helps inspire the client to reach his/her personal goals   __________________________________________________________________________________________________________________________________    [place] [facility] Get Moving Program    Our team of physical therapists is trained to help you understand and take control of your condition. They will perform a thorough evaluation to determine your ability for activity and develop a customized plan to fit your goals and physical ability.  Scheduling: Unsure if the Get Moving program is right for you? Discuss the program with your medical provider or diabetes educator. You can also call us at [phone] to ask questions or schedule an appointment.   [facility] Get Moving Program  ______________________________________________________________________________________________________________________  Bluetooth Scale:    We hope to provide you with high quality telephone and virtual healthcare visits while social distancing for COVID-19 is necessary, as well as in the future when virtual visits may be more convenient for you.     Our technology team made it possible for Bluetooth scales to send weight measurements to our electronic medical record. This allows weights from you weighing at home to securely flow into the medical record, which will improve telephone and virtual visits.   Additionally, studies have shown that adults actually lose more weight when their weights are automatically sent to someone else, and  also that this process is not stressful for those adults.    Below is a link for purchasing the scale, with a discount code for our patients. You may call your insurance company to see if they will reimburse you for the cost of the scale, as a piece of durable medical equipment. The scales only go up to a weight of 400 pounds. This is an issue and we are working with the developer on increasing this. We found no scales that go over 400lb that have blue-tooth for connecting to [employer].    Scale to purchase: the YadaHome  Body  Scale: [email or web address]=aw.ds    Discount Code: We have a discount code for our patients to bring the cost down to $50, the code is: [place]_Scale_20%off    Steps to link the scale to The Mother List via an Android Phone (you can always disconnect at any point in the future):  1. The order must be placed first before the patient can access Track My Health within [employer].  2. Download Google Fit vin from the Google Play Store   a. Log in or register using your Google account   3. Download the The Mother List vin from Google Play Store  a. Select add organization   b. Search for [employer] and select it   c. Log into The Mother List  d. Select Track My Health   e. Select the green connect my account button   f. When prompted log into your Google account   g. Select okay to confirm the account   4. Download the [employer] Health Mate vin from Google Play Store  a. [place] for [employer]   b. Go to profile   c. Tap google fit under the Apps section  d. Select the option to activate Google Fit integration   e. Select the same Google account   f. Select okay to confirm the account  g.   Steps to link the scale to The Mother List via an iPhone (you can always disconnect at any point in the future):  **Note "[employer]" is not available for download on an iPad**  1. The order must be placed first before the patient can access Track My Health  within [employer].  2. Locate the Health saulo on your iPhone.  a. Set up your Apple Health account as prompted  b. The Sources page will show Apps that communicate with your Health saulo. Once all steps are completed, you should see Extreme Reach and Intuitive Motiont listed under the Apps section and your iPhone under the devices section.  i. Select Extreme Reach  1. Under 'ALLOW  Roomster  TO WRITE DATA ensure the toggle is on for Weight.  2. This will allow the scale to add your weight to the [facility]  ii. Select Ps[employer]ova Neuro[employer]  1. Under 'ALLOW  MobilyTrip  TO READ DATA ensure the toggle is on for Weight.  2. This allows [employer] to grab the weight from [employer] so your provider can see your weights.  3. Download the [employer] saulo from the Saulo Store   a. Select add organization                                                  b. Search for [email or web address] and select it  c. Log into [employer]  d. Select Track My Health   e. Select the green connect my account button   f. Follow prompts to link your device to [employer].  4. Download the Withings health mate saulo in the Saulo Store   a. Sharon for [employer]   b. Go to profile   c. Tap Health under the Apps section  d. If prompted to allow access with the Health Saulo, toggle weight on for read and write access.

## 2021-05-19 NOTE — ASSESSMENT & PLAN NOTE
1 mo post op. Feeling good. No abdominal pain. No dysphasia. No reflux- continues PPI. Okay to taper off of this. Some constipation, using smooth move tea as needed. Recommend trying benefiber - tapering up slowly as needed. Getting adequate fluids and protein. Hunger and cravings well controlled.     Blood sugars stable off of antidiabetic agents. A1C down from 7.7 to 5.9.     Pathology normal. No gallbladder.

## 2021-05-26 ENCOUNTER — TELEPHONE (OUTPATIENT)
Dept: PHARMACY | Facility: CLINIC | Age: 56
End: 2021-05-26

## 2021-05-26 NOTE — TELEPHONE ENCOUNTER
Please abstract the following data from this visit with this patient into the appropriate field in Epic:    Other Tests found in the patient's chart through Chart Review/Care Everywhere:    A1c, Vitamin D, Comprehensive metabolic panel, lipid panel, thyroid stimulating hormone done by this group [facility] on this date: 5/11/21    Note to Abstraction: If this section is blank, no results were found via Chart Review/Care Everywhere.    Lauren Bloch, PharmD  Medication Therapy Management Pharmacist   [facility] [place]

## 2021-07-12 NOTE — PROGRESS NOTES
"Stephanie Sanches is a 55 year old female who is being evaluated via a billable video visit.      The patient has been notified of following:     \"This video visit will be conducted via a call between you and your physician/provider. We have found that certain health care needs can be provided without the need for an in-person physical exam.  This service lets us provide the care you need with a video conversation.  If a prescription is necessary we can send it directly to your pharmacy.  If lab work is needed we can place an order for that and you can then stop by our lab to have the test done at a later time.    Video visits are billed at different rates depending on your insurance coverage.  Please reach out to your insurance provider with any questions.    If during the course of the call the physician/provider feels a video visit is not appropriate, you will not be charged for this service.\"    Patient has given verbal consent for Video visit? Yes  How would you like to obtain your AVS? MyChart  If you are dropped from the video visit, the video invite should be resent to: Text to cell phone: [phone]  Will anyone else be joining your video visit? No   {If patient encounters technical issues they should call [phone]      Video-Visit Details    Type of service:  Video Visit    Video Start Time: 3:31 PM  Video End Time: 4:00 PM    Originating Location (pt. Location): Home    Distant Location (provider location):  [facility] [place]     Platform used for Video Visit: Red Zebra    During this virtual visit the patient is located in [place], patient verifies this as the location during the entirety of this visit.     Nutrition Reassessment  Reason For Visit:  Stephanie Sanches is a 55 year old female presenting today for nutrition follow-up, 3 month s/p Sleeve Gastrectomy with Dr Zambrano (4/5/2021).  Patient referred by Farida Seymour.    Patient with Co-morbidities of obesity " including:  Type II DM yes  Renal Failure no  Sleep apnea no  Hypertension no   Dyslipidemia no  Joint pain no  Back pain no  GERD no      Pt's history is also significant for NAM, followed by GI     Anthropometrics:  Initial Consult Weight: 336 lbs  Day of Surgery Weight (4/5/21): 308.7 lbs  Weight (5/11/21): 290 lbs    Current Weight (7/13/21): 261 lbs with BMI 40. 3  Weight loss: -75 lbs from initial consult; -47.7 lbs from day of surgery    Current Vitamins/Minerals:   MVI with Iron 18 mg BID (Equate children's chewable)  ashu melt calcium 500 BID  ashu melts B12 1000mcg once a day  D3 5000 international unit(s) once day  B1 Ashu melt 12.5 mg once day  ashu probiotic 1 day      Nutrition History:  Today - pt reports low appetite. Currently consuming and tolerating bariatric regular diet, 3 meals/day, portions:  ~1/4c-1/2c. Focusing on high protein (chicken, yogurt, protein shake) and introducing more fruit/veg (spinach). Fluid intake appears adequate, consuming 48-64oz. BMs every 3 days (taking miralax).     Beverages: water, decaf, protein shake    Activity: Walking, arm exercises, chair exercise    Progress with Previous Goals:  Goals:  1) Follow soft diet for 4 weeks, then to progress to bariatric regular diet (6/4). - met, continues  2) Consume 60 grams of protein/day.- met, continues  3) Sip on 48-64 oz of fluids/day- between meals only.- met, continues  4) Eat slowly (>20 min/meal), chewing foods well (to applesauce-like consistency).- met, contines  5) Limit portions to 1/2 cup/meal.- met, continues  6) Take the following after a Sleeve Gastrectomy:    Multivitamin/minerals: adult dose 2 times daily met, continues    Iron: 45-60 mg elemental (18-36 mg if low risk) - may partly or fully be covered in multivitamin  met, continues    Calcium Citrate containing vitamin D: 500 mg 3 times daily or 600 mg 2 times daily   Vitamin D3: 3000 units per day (total between all supplements) - met, continues    Vitamin  B12: sublingual form of at least 500 mcg daily or injection of 1000 mcg monthly -met, continues    B-50 Complex once daily (can hold off on this until 3 months)    Nutrition Prescription:  Grams Protein: 60 (minimum)   Amount of Fluid: 48-64 oz      Nutrition Diagnosis  Previous: Food and nutrition-related knowledge deficit r/t lack of prior exposure to diet advancements beyond bariatric pureed diet aeb pt unable to verbalize full understanding of bariatric soft and regular consistency diets.    Current: Food and nutrition-related knowledge deficit r/t lack of prior exposure to diet instruction beyond 3 months s/p SG as evidenced by Pt seeking further guidance from RD on diet instruction beyond 3 months s/p SG.     Intervention  Materials/Education provided, reviewed bariatric regular consistency diet, protein intake, fluid intake, eating pace, chewing foods well, portion control, sugar/fat intake, recommended vitamin/mineral supplements. Reviewed labs and vitamin/mineral supplements. Patient demonstrates understanding.     Expected Engagement: good    Goals:  1) Follow bariatric regular diet.   2) Consume 60 grams of protein/day.  3) Sip on 48-64 oz of fluids/day- between meals only.  4) Eat slowly (>20 min/meal), chewing foods well (to applesauce-like consistency).  5) Limit portions to 1/2 cup/meal.  6) Continue the following after a Sleeve Gastrectomy:    Multivitamin/minerals: adult dose 2 times daily    Iron: 45-60 mg elemental (18-36 mg if low risk) - may partly or fully be covered in multivitamin     Calcium Citrate containing vitamin D: 500 mg 3 times daily or 600 mg 2 times daily    Vitamin B12: sublingual form of at least 500 mcg daily or injection of 1000 mcg monthly     B-50 Complex once daily     Keeping Up Your Diet after Weight Loss Surgery  [email or web address]    Exercises after Weight Loss Surgery (strengthening, when no weight lifting  restrictions)  [email or web address]           Follow-Up: 3 months or prn    Time spent with patient: 29 minutes.  Sunitha Galvan RD LD     refused

## 2021-07-13 ENCOUNTER — VIRTUAL VISIT (OUTPATIENT)
Dept: ENDOCRINOLOGY | Facility: CLINIC | Age: 56
End: 2021-07-13
Payer: COMMERCIAL

## 2021-07-13 DIAGNOSIS — Z98.84 S/P LAPAROSCOPIC SLEEVE GASTRECTOMY: ICD-10-CM

## 2021-07-13 DIAGNOSIS — Z71.3 NUTRITIONAL COUNSELING: Primary | ICD-10-CM

## 2021-07-13 DIAGNOSIS — E66.9 OBESITY: ICD-10-CM

## 2021-07-13 PROCEDURE — 97803 MED NUTRITION INDIV SUBSEQ: CPT | Mod: GT | Performed by: DIETITIAN, REGISTERED

## 2021-07-13 NOTE — LETTER
"7/13/2021       RE: Stephanie Sanches  [address] [address]     Dear Colleague,    Thank you for referring your patient, Stephanie Sanches, to the [facility] [place] at [facility]. Please see a copy of my visit note below.    Stephanie Sanches is a 55 year old female who is being evaluated via a billable video visit.      The patient has been notified of following:     \"This video visit will be conducted via a call between you and your physician/provider. We have found that certain health care needs can be provided without the need for an in-person physical exam.  This service lets us provide the care you need with a video conversation.  If a prescription is necessary we can send it directly to your pharmacy.  If lab work is needed we can place an order for that and you can then stop by our lab to have the test done at a later time.    Video visits are billed at different rates depending on your insurance coverage.  Please reach out to your insurance provider with any questions.    If during the course of the call the physician/provider feels a video visit is not appropriate, you will not be charged for this service.\"    Patient has given verbal consent for Video visit? Yes  How would you like to obtain your AVS? MyChart  If you are dropped from the video visit, the video invite should be resent to: Text to cell phone: [phone]  Will anyone else be joining your video visit? No   {If patient encounters technical issues they should call [phone]      Video-Visit Details    Type of service:  Video Visit    Video Start Time: 3:31 PM  Video End Time: 4:00 PM    Originating Location (pt. Location): Home    Distant Location (provider location):  [facility] [place]     Platform used for Video Visit: [employer]    During this virtual visit the patient is located in [place], patient verifies " this as the location during the entirety of this visit.     Nutrition Reassessment  Reason For Visit:  Stephanie Sanches is a 55 year old female presenting today for nutrition follow-up, 3 month s/p Sleeve Gastrectomy with Dr Zambrano (4/5/2021).  Patient referred by Farida Seymour.    Patient with Co-morbidities of obesity including:  Type II DM yes  Renal Failure no  Sleep apnea no  Hypertension no   Dyslipidemia no  Joint pain no  Back pain no  GERD no      Pt's history is also significant for NAM, followed by GI     Anthropometrics:  Initial Consult Weight: 336 lbs  Day of Surgery Weight (4/5/21): 308.7 lbs  Weight (5/11/21): 290 lbs    Current Weight (7/13/21): 261 lbs with BMI 40. 3  Weight loss: -75 lbs from initial consult; -47.7 lbs from day of surgery    Current Vitamins/Minerals:   MVI with Iron 18 mg BID (Equate children's chewable)  ashu melt calcium 500 BID  ashu melts B12 1000mcg once a day  D3 5000 international unit(s) once day  B1 Ashu melt 12.5 mg once day  ashu probiotic 1 day      Nutrition History:  Today - pt reports low appetite. Currently consuming and tolerating bariatric regular diet, 3 meals/day, portions:  ~1/4c-1/2c. Focusing on high protein (chicken, yogurt, protein shake) and introducing more fruit/veg (spinach). Fluid intake appears adequate, consuming 48-64oz. BMs every 3 days (taking miralax).     Beverages: water, decaf, protein shake    Activity: Walking, arm exercises, chair exercise    Progress with Previous Goals:  Goals:  1) Follow soft diet for 4 weeks, then to progress to bariatric regular diet (6/4). - met, continues  2) Consume 60 grams of protein/day.- met, continues  3) Sip on 48-64 oz of fluids/day- between meals only.- met, continues  4) Eat slowly (>20 min/meal), chewing foods well (to applesauce-like consistency).- met, contines  5) Limit portions to 1/2 cup/meal.- met, continues  6) Take the following after a Sleeve Gastrectomy:    Multivitamin/minerals: adult dose 2  times daily met, continues    Iron: 45-60 mg elemental (18-36 mg if low risk) - may partly or fully be covered in multivitamin  met, continues    Calcium Citrate containing vitamin D: 500 mg 3 times daily or 600 mg 2 times daily   Vitamin D3: 3000 units per day (total between all supplements) - met, continues    Vitamin B12: sublingual form of at least 500 mcg daily or injection of 1000 mcg monthly -met, continues    B-50 Complex once daily (can hold off on this until 3 months)    Nutrition Prescription:  Grams Protein: 60 (minimum)   Amount of Fluid: 48-64 oz      Nutrition Diagnosis  Previous: Food and nutrition-related knowledge deficit r/t lack of prior exposure to diet advancements beyond bariatric pureed diet aeb pt unable to verbalize full understanding of bariatric soft and regular consistency diets.    Current: Food and nutrition-related knowledge deficit r/t lack of prior exposure to diet instruction beyond 3 months s/p SG as evidenced by Pt seeking further guidance from RD on diet instruction beyond 3 months s/p SG.     Intervention  Materials/Education provided, reviewed bariatric regular consistency diet, protein intake, fluid intake, eating pace, chewing foods well, portion control, sugar/fat intake, recommended vitamin/mineral supplements. Reviewed labs and vitamin/mineral supplements. Patient demonstrates understanding.     Expected Engagement: good    Goals:  1) Follow bariatric regular diet.   2) Consume 60 grams of protein/day.  3) Sip on 48-64 oz of fluids/day- between meals only.  4) Eat slowly (>20 min/meal), chewing foods well (to applesauce-like consistency).  5) Limit portions to 1/2 cup/meal.  6) Continue the following after a Sleeve Gastrectomy:    Multivitamin/minerals: adult dose 2 times daily    Iron: 45-60 mg elemental (18-36 mg if low risk) - may partly or fully be covered in multivitamin     Calcium Citrate containing vitamin D: 500 mg 3 times daily or 600 mg 2 times daily    Vitamin  B12: sublingual form of at least 500 mcg daily or injection of 1000 mcg monthly     B-50 Complex once daily     Keeping Up Your Diet after Weight Loss Surgery  [email or web address]    Exercises after Weight Loss Surgery (strengthening, when no weight lifting restrictions)  [email or web address]           Follow-Up: 3 months or prn    Time spent with patient: 29 minutes.  Sunitha Galvan RD LD

## 2021-07-13 NOTE — PATIENT INSTRUCTIONS
Rickey Carrillo,    Follow-up with RD in 3 month    Thank you,    Sunitha Galvan, RD, LD  If you would like to schedule or reschedule an appointment with the RD, please call [phone]    Nutrition Goals    1) Follow bariatric regular diet.   2) Consume 60 grams of protein/day.  3) Sip on 48-64 oz of fluids/day- between meals only.  4) Eat slowly (>20 min/meal), chewing foods well (to applesauce-like consistency).  5) Limit portions to 1/2 cup/meal.  6) Continue the following after a Sleeve Gastrectomy:    Multivitamin/minerals: adult dose 2 times daily    Iron: 45-60 mg elemental (18-36 mg if low risk) - may partly or fully be covered in multivitamin     Calcium Citrate containing vitamin D: 500 mg 3 times daily or 600 mg 2 times daily    Vitamin B12: sublingual form of at least 500 mcg daily or injection of 1000 mcg monthly     B-50 Complex once daily      Keeping Up Your Diet after Weight Loss Surgery  [email or web address]     Exercises after Weight Loss Surgery (strengthening, when no weight lifting restrictions)  [email or web address]       Interested in working with a health ?  Health coaches work with you to improve your overall health and wellbeing.  They look at the whole person, and may involve discussion of different areas of life, including, but not limited to the four pillars of health (sleep, exercise, nutrition, and stress management). Discuss with your care team if you would like to start working a health .    Health Coaching-3 Pack:    $99 for three health coaching visits    Visits may be done in person or via phone    Coaching is a partnership between the  and the client; Coaches do not prescribe or diagnose    Coaching helps inspire the client to reach his/her personal goals      Virtual Support Groups are Available             Healthy Lifestyle Support Group      All are welcome!     Facilitator: Martha Coates, Certified Health     - Meets once a month on a Friday  from 12:30pm to 1:30pm.  - Due to Covid-19 she is doing this Support Group virtually using Microsoft Teams.  - 60 minutes of small group guided discussion, support and resources.  - Please email Martha directly at [email or web address] to receive monthly invitations.  - If you opt to participate in individual health coaching sessions or for general questions, contact Martha via email.  - Martha will send out invites for each session, so the phone number and the conference ID may change for each session.

## 2021-07-14 ENCOUNTER — VIRTUAL VISIT (OUTPATIENT)
Dept: ENDOCRINOLOGY | Facility: CLINIC | Age: 56
End: 2021-07-14
Payer: COMMERCIAL

## 2021-07-14 VITALS — HEIGHT: 68 IN | BODY MASS INDEX: 39.4 KG/M2 | WEIGHT: 260 LBS

## 2021-07-14 DIAGNOSIS — E11.8 DIABETES MELLITUS TYPE 2 WITH COMPLICATIONS (H): ICD-10-CM

## 2021-07-14 DIAGNOSIS — E66.01 MORBID OBESITY (H): ICD-10-CM

## 2021-07-14 DIAGNOSIS — K75.81 NASH (NONALCOHOLIC STEATOHEPATITIS): ICD-10-CM

## 2021-07-14 DIAGNOSIS — E55.9 VITAMIN D DEFICIENCY: ICD-10-CM

## 2021-07-14 DIAGNOSIS — Z98.84 S/P LAPAROSCOPIC SLEEVE GASTRECTOMY: Primary | ICD-10-CM

## 2021-07-14 PROCEDURE — 99215 OFFICE O/P EST HI 40 MIN: CPT | Mod: GT | Performed by: NURSE PRACTITIONER

## 2021-07-14 ASSESSMENT — MIFFLIN-ST. JEOR: SCORE: 1824.91

## 2021-07-14 ASSESSMENT — PAIN SCALES - GENERAL: PAINLEVEL: NO PAIN (0)

## 2021-07-14 NOTE — PROGRESS NOTES
Lorena is a 55 year old who is being evaluated via a billable video visit.      How would you like to obtain your AVS? [employer]  If the video visit is dropped, the invitation should be resent by: Text to cell phone: [phone]  Will anyone else be joining your video visit? No      Video Start Time: 1500  Video-Visit Details    Type of service:  Video Visit    Video End Time:1530    Originating Location (pt. Location): Home    Distant Location (provider location):  [facility] [place]     Platform used for Video Visit: [employer]

## 2021-07-14 NOTE — LETTER
2021       RE: Stephanie Sanches  [address]  [place] [address]     Dear Colleague,    Thank you for referring your patient, Stephanie Sanches, to the [facility] [place] at [facility]. Please see a copy of my visit note below.    Return Bariatric Surgery Note    RE: Stephanie Sanches  MR#: [id number]  : 1965  VISIT DATE: 2021      Dear Benson Allen,    I had the pleasure of seeing your patient, Stephanie Sanches, in my post-bariatric surgery assessment clinic.    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 Down 20% TBW since consult. Doing well over all. A1C is within normal range now with out any antidiabetic agents. Liver function tests are also normal. No abdominal pain (preop she had RUQ pain fairly consistently, suspected from her liver).     Tapered off omeprazole but now has burning in back of throat. Recommend restarting omeprazole daily for 2 months and then go to as needed if well controlled in the future.     Continued constipation. Suspect she is becoming backed up. Having BM every 3 days with miralax every other day. Just started probiotic. In last two weeks has developed disinterest in food, fullness, difficulty eating more than 1/4 cup at a time. Recommend adding benefiber, increasing miralax to daily until BM more regular. Continue benefiber and taper down on miralax as able.     No dysphasia.     Continues to lose weight steadily. Patient having hard time seeing the weight loss. Recommend pictures to help with visualizing that.     Hunger/ cravings well  "controlled. Could benefit from increased fluids.          Relevant Medications    omeprazole (PRILOSEC) 20 MG DR capsule           40 minutes spent on the date of the encounter doing chart review, history and exam, documentation and further activities per the note  [id number]}    MEDICATIONS:        - Continue other medications without change       - restart omeprazole once daily        - add benefiber       - miralax daily until stools more regular   FUTURE LABS:       - Schedule a fasting blood draw - 9 months- 1 year post op   FUTURE APPOINTMENTS:       - Follow-up visit in 3 months       Stephanie Sanches is a 55 year old female who presents to clinic today for the following health issues       CHIEF COMPLAINT: Post-bariatric surgery follow-up    HISTORY OF PRESENT ILLNESS:  Questions Regarding Prior Weight Loss Surgery Reviewed With Patient 7/14/2021   I had the following weight loss procedure: Sleeve Gastrectomy   What year was your surgery? 4/5/2021   How has your weight changed since your last visit? I have lost weight   Are you currently taking any weight loss medications? No   Do you currently have any of the following: Heartburn, acid reflux, or GERD (acid reflux disease)?   Have you been to the Emergency room since your last visit with us? No   Were you in the hospital since your last visit with us? No   Do you have any concerns today? gerd and acid reflux constipation     Sleeve 4/5/2021 Dr. Zambrano   3 mo post op     Blood sugars     BM every 3 days. Miralax every other day. Bariatric probiotic (just started). Stool is not difficult to pass. Sometimes feels uncomfortable. Increasing fiber.     Water intake 48-64oz     Nothing sounds good. Feels full. Nothing tastes good. Worse in the last couple of weeks.     Decreased urine out put- decreased frequency, decreased output, normal color - more in the last couple weeks     Burning in the back of throat- \"all day\" and \"when I lay down\" - increased frequency " and severity     Eating on a schedule, never after 7pm     Meeting protein goals with protein shake. Only able to eat about 1/4 cup at a time.     Weight History:     7/14/2021   What is your highest lifetime weight? 335   What is your lowest weight since surgery? (In pounds) 260     Initial Weight (lbs): 326 lbs  Weight: 117.9 kg (260 lb) (pt reported)  Last Visits Weight: 129.3 kg (285 lb)  Cumulative weight loss (lbs): 66  Weight Loss Percentage: 20.25%   Wt Readings from Last 5 Encounters:   07/14/21 117.9 kg (260 lb)   05/18/21 129.3 kg (285 lb)   04/14/21 136 kg (299 lb 12.8 oz)   04/05/21 147 kg (324 lb 1.2 oz)   03/16/21 145.2 kg (320 lb)        Questions Regarding Co-Morbidities and Health Concerns Reviewed With Patient 7/14/2021   Pre-diabetes: Never   Diabetes II: Gone away   What was your most recent hemoglobin a1c  -   How many years have you had diabetes? -   High Blood Pressure: Never   High cholesterol: Never   Heartburn/Reflux: Never   Are you taking daily medication for heartburn, acid reflux, or GERD (acid reflux disease)? No   Sleep apnea: Never   PCOS: Never   Back pain: Improved   Joint pain: Improved   Lower leg swelling: Stayed the same       Eating Habits 7/14/2021   How many meals do you eat per day? 3   Do you snack between meals? No   How much food are you eating at each meal? Less than 1/2 cup   Are you able to separate your meals and liquids by at least 30 minutes? Yes   Are you able to avoid liquid calories? Yes       Exercise Questions Reviewed With Patient 7/14/2021   How often do you exercise? Daily   What is the duration of your exercise (in minutes)? 30 Minutes   What types of exercise do you do? walking, weightlifting   What keeps you from being more active?  I am as active as I can possbily be       Social History:      7/14/2021   Are you smoking? No   Are you drinking alcohol? No       Medications:  Current Outpatient Medications   Medication     calcium citrate-vitamin D  "(CALCIUM CITRATE +) 315-200 MG-UNIT TABS per tablet     Cetaphil Moisturizing (CETAPHIL) external lotion     Continuous Blood Gluc  (FREESTYLE CARLOS 14 DAY READER) CINDI     Continuous Blood Gluc Sensor (FREESTYLE CARLOS 14 DAY SENSOR) MISC     multivitamin w/minerals (MULTI-VITAMIN) tablet     omeprazole (PRILOSEC) 20 MG DR capsule     ondansetron (ZOFRAN-ODT) 4 MG ODT tab     Probiotic Product (PROBIOTIC-10 PO)     No current facility-administered medications for this visit.         7/14/2021   Do you avoid NSAIDs such as (Ibuprofen, Aleve, Naproxen, Advil)?   Yes       ROS:  GI:      7/14/2021   Vomiting: No   Diarrhea: No   Constipation: Yes   Swallowing trouble: No   Abdominal pain: No   Heartburn: Yes   Rash in skin folds: Yes   Depression: No   Stress urinary incontinence No     Skin:   BAR RBS ROS - SKIN 7/14/2021   Rash in skin folds: Yes     Psych:      7/14/2021   Depression: No   Anxiety: No     Female Only:   BAR RBS ROS - FEMALE ONLY 7/14/2021   Female only: None of the above     Postop labs 7/6/2021- care everywhere  A1C 5.6, labs within normal limits otherwise       PHYSICAL EXAM:  Objective    Ht 1.715 m (5' 7.5\")   Wt 117.9 kg (260 lb)   BMI 40.12 kg/m    Vitals - Patient Reported  Pain Score: No Pain (0)        Physical Exam   GENERAL: Healthy, alert and no distress  EYES: Eyes grossly normal to inspection.  No discharge or erythema, or obvious scleral/conjunctival abnormalities.  RESP: No audible wheeze, cough, or visible cyanosis.  No visible retractions or increased work of breathing.    SKIN: Visible skin clear. No significant rash, abnormal pigmentation or lesions.  NEURO: Cranial nerves grossly intact.  Mentation and speech appropriate for age.  PSYCH: Mentation appears normal, affect normal/bright, judgement and insight intact, normal speech and appearance well-groomed.        Sincerely,    Farida Seymour NP      Lorena is a 55 year old who is being evaluated via a billable video visit.  "     How would you like to obtain your AVS? [employer]  If the video visit is dropped, the invitation should be resent by: Text to cell phone: [phone]  Will anyone else be joining your video visit? No      Video Start Time: 1500  Video-Visit Details    Type of service:  Video Visit    Video End Time:1530    Originating Location (pt. Location): Home    Distant Location (provider location):  [facility] [place]     Platform used for Video Visit: [employer]

## 2021-07-14 NOTE — NURSING NOTE
"Chief Complaint   Patient presents with     Follow Up     3 Months follow-up Laparscopic Gastric Sleeve Surgery       Vitals:    07/14/21 1429   Weight: 117.9 kg (260 lb)   Height: 1.715 m (5' 7.5\")       Body mass index is 40.12 kg/m .                         "

## 2021-07-14 NOTE — PROGRESS NOTES
Return Bariatric Surgery Note    RE: Stephanie Sanches  MR#: [id number]  : 1965  VISIT DATE: 2021      Dear Benson Allen,    I had the pleasure of seeing your patient, Stephanie Sanches, in my post-bariatric surgery assessment clinic.    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 Down 20% TBW since consult. Doing well over all. A1C is within normal range now with out any antidiabetic agents. Liver function tests are also normal. No abdominal pain (preop she had RUQ pain fairly consistently, suspected from her liver).     Tapered off omeprazole but now has burning in back of throat. Recommend restarting omeprazole daily for 2 months and then go to as needed if well controlled in the future.     Continued constipation. Suspect she is becoming backed up. Having BM every 3 days with miralax every other day. Just started probiotic. In last two weeks has developed disinterest in food, fullness, difficulty eating more than 1/4 cup at a time. Recommend adding benefiber, increasing miralax to daily until BM more regular. Continue benefiber and taper down on miralax as able.     No dysphasia.     Continues to lose weight steadily. Patient having hard time seeing the weight loss. Recommend pictures to help with visualizing that.     Hunger/ cravings well controlled. Could benefit from increased fluids.          Relevant Medications    omeprazole (PRILOSEC) 20 MG DR capsule           40 minutes spent on the date of the encounter doing chart review, history and exam, documentation and further activities per the note  [id number]}    MEDICATIONS:        - Continue other medications without  "change       - restart omeprazole once daily        - add benefiber       - miralax daily until stools more regular   FUTURE LABS:       - Schedule a fasting blood draw - 9 months- 1 year post op   FUTURE APPOINTMENTS:       - Follow-up visit in 3 months       Stephanie Sanches is a 55 year old female who presents to clinic today for the following health issues       CHIEF COMPLAINT: Post-bariatric surgery follow-up    HISTORY OF PRESENT ILLNESS:  Questions Regarding Prior Weight Loss Surgery Reviewed With Patient 7/14/2021   I had the following weight loss procedure: Sleeve Gastrectomy   What year was your surgery? 4/5/2021   How has your weight changed since your last visit? I have lost weight   Are you currently taking any weight loss medications? No   Do you currently have any of the following: Heartburn, acid reflux, or GERD (acid reflux disease)?   Have you been to the Emergency room since your last visit with us? No   Were you in the hospital since your last visit with us? No   Do you have any concerns today? gerd and acid reflux constipation     Sleeve 4/5/2021 Dr. Zambrano   3 mo post op     Blood sugars     BM every 3 days. Miralax every other day. Bariatric probiotic (just started). Stool is not difficult to pass. Sometimes feels uncomfortable. Increasing fiber.     Water intake 48-64oz     Nothing sounds good. Feels full. Nothing tastes good. Worse in the last couple of weeks.     Decreased urine out put- decreased frequency, decreased output, normal color - more in the last couple weeks     Burning in the back of throat- \"all day\" and \"when I lay down\" - increased frequency and severity     Eating on a schedule, never after 7pm     Meeting protein goals with protein shake. Only able to eat about 1/4 cup at a time.     Weight History:     7/14/2021   What is your highest lifetime weight? 335   What is your lowest weight since surgery? (In pounds) 260     Initial Weight (lbs): 326 lbs  Weight: 117.9 kg " (260 lb) (pt reported)  Last Visits Weight: 129.3 kg (285 lb)  Cumulative weight loss (lbs): 66  Weight Loss Percentage: 20.25%   Wt Readings from Last 5 Encounters:   07/14/21 117.9 kg (260 lb)   05/18/21 129.3 kg (285 lb)   04/14/21 136 kg (299 lb 12.8 oz)   04/05/21 147 kg (324 lb 1.2 oz)   03/16/21 145.2 kg (320 lb)        Questions Regarding Co-Morbidities and Health Concerns Reviewed With Patient 7/14/2021   Pre-diabetes: Never   Diabetes II: Gone away   What was your most recent hemoglobin a1c  -   How many years have you had diabetes? -   High Blood Pressure: Never   High cholesterol: Never   Heartburn/Reflux: Never   Are you taking daily medication for heartburn, acid reflux, or GERD (acid reflux disease)? No   Sleep apnea: Never   PCOS: Never   Back pain: Improved   Joint pain: Improved   Lower leg swelling: Stayed the same       Eating Habits 7/14/2021   How many meals do you eat per day? 3   Do you snack between meals? No   How much food are you eating at each meal? Less than 1/2 cup   Are you able to separate your meals and liquids by at least 30 minutes? Yes   Are you able to avoid liquid calories? Yes       Exercise Questions Reviewed With Patient 7/14/2021   How often do you exercise? Daily   What is the duration of your exercise (in minutes)? 30 Minutes   What types of exercise do you do? walking, weightlifting   What keeps you from being more active?  I am as active as I can possbily be       Social History:      7/14/2021   Are you smoking? No   Are you drinking alcohol? No       Medications:  Current Outpatient Medications   Medication     calcium citrate-vitamin D (CALCIUM CITRATE +) 315-200 MG-UNIT TABS per tablet     Cetaphil Moisturizing (CETAPHIL) external lotion     Continuous Blood Gluc  (FREESTYLE CARLOS 14 DAY READER) CINDI     Continuous Blood Gluc Sensor (FREESTYLE CARLOS 14 DAY SENSOR) MISC     multivitamin w/minerals (MULTI-VITAMIN) tablet     omeprazole (PRILOSEC) 20 MG   "capsule     ondansetron (ZOFRAN-ODT) 4 MG ODT tab     Probiotic Product (PROBIOTIC-10 PO)     No current facility-administered medications for this visit.         7/14/2021   Do you avoid NSAIDs such as (Ibuprofen, Aleve, Naproxen, Advil)?   Yes       ROS:  GI:      7/14/2021   Vomiting: No   Diarrhea: No   Constipation: Yes   Swallowing trouble: No   Abdominal pain: No   Heartburn: Yes   Rash in skin folds: Yes   Depression: No   Stress urinary incontinence No     Skin:   BAR RBS ROS - SKIN 7/14/2021   Rash in skin folds: Yes     Psych:      7/14/2021   Depression: No   Anxiety: No     Female Only:   BAR RBS ROS - FEMALE ONLY 7/14/2021   Female only: None of the above     Postop labs 7/6/2021- care everywhere  A1C 5.6, labs within normal limits otherwise       PHYSICAL EXAM:  Objective    Ht 1.715 m (5' 7.5\")   Wt 117.9 kg (260 lb)   BMI 40.12 kg/m    Vitals - Patient Reported  Pain Score: No Pain (0)        Physical Exam   GENERAL: Healthy, alert and no distress  EYES: Eyes grossly normal to inspection.  No discharge or erythema, or obvious scleral/conjunctival abnormalities.  RESP: No audible wheeze, cough, or visible cyanosis.  No visible retractions or increased work of breathing.    SKIN: Visible skin clear. No significant rash, abnormal pigmentation or lesions.  NEURO: Cranial nerves grossly intact.  Mentation and speech appropriate for age.  PSYCH: Mentation appears normal, affect normal/bright, judgement and insight intact, normal speech and appearance well-groomed.        Sincerely,    Farida Seymour NP    "

## 2021-07-14 NOTE — ASSESSMENT & PLAN NOTE
3 months post op. Down 20% TBW since consult. Doing well over all. A1C is within normal range now with out any antidiabetic agents. Liver function tests are also normal. No abdominal pain (preop she had RUQ pain fairly consistently, suspected from her liver).     Tapered off omeprazole but now has burning in back of throat. Recommend restarting omeprazole daily for 2 months and then go to as needed if well controlled in the future.     Continued constipation. Suspect she is becoming backed up. Having BM every 3 days with miralax every other day. Just started probiotic. In last two weeks has developed disinterest in food, fullness, difficulty eating more than 1/4 cup at a time. Recommend adding benefiber, increasing miralax to daily until BM more regular. Continue benefiber and taper down on miralax as able.     No dysphasia.     Continues to lose weight steadily. Patient having hard time seeing the weight loss. Recommend pictures to help with visualizing that.     Hunger/ cravings well controlled. Could benefit from increased fluids.

## 2021-07-14 NOTE — PATIENT INSTRUCTIONS
"It was a pleasure meeting with you today.    Thank you for allowing us the privilege of caring for you. We hope we provided you with the excellent service you deserve.   Please let us know if there is anything else we can do for you so that we can be sure you are leaving completely satisfied with your care experience.    To ensure the quality of our services you may be receiving a patient satisfaction survey from an independent patient satisfaction monitoring company.    The greatest compliment you can give is a \"Likely to Recommend\"      Your visit was with Farida Seymour NP today.     Instructions per today's visit:       MEDICATIONS:        - Continue other medications without change       - restart omeprazole once daily        - add benefiber       - miralax daily until stools more regular   FUTURE LABS:       - Schedule a fasting blood draw - 9 months- 1 year post op   FUTURE APPOINTMENTS:       - Follow-up visit in 3 months, sooner if needed       To schedule appointments with our team, please call [phone] option #1    Please call during clinic hours Monday through Friday 8:00a - 4:00p if you have questions or you can contact us via [employer] [place] at anytime.      Nurses: [phone]  Fax: [phone]  Surgery Scheduler: [phone]    Please call the hospital at [phone] to speak with our on call MDs if you have urgent needs after hours, during weekends, or holidays.    **Please note if you need to go to the Emergency Room for any reason in the first 90 days after surgery it is important to go to the [facility] ER on the [address] on [facility] off of the [address] off [address]    *For patients who are currently enrolled in our program and have not yet had weight loss surgery please note*:    You must be at your required goal weight at the time of your Anesthesia clinic visit as well as the day of surgery or your surgery will be canceled. You will not be able to obtain a new surgery date until " you have met your goal weight.    Lab results will be communicated through My Chart or letter (if My Chart not used). Please call the clinic if you have not received communication after 1 week or if you have any questions.?    Main follow-up parameters for all bariatric patients     Patients who have undergone Bariatric surgery:    1. Follow-up interval during the first year: ~1 week, 1 month, 3 month, 6 month,12 months.  Every 6 months until 5 years; and then annually thereafter.  The goal of follow-up sessions is to ensure that food intake behavior (choice of food and eating speed) and exercise/activity level are set appropriately.    2. Yearly lab tests ordered by our clinic.      3. The bariatric team should be aware and potentially evaluate all adverse gastrointestinal symptoms (dysphagia, abdominal pain, nausea, vomiting, diarrhea, heartburn, reflux, etc), which can be a sign of complication.  The bariatric surgeons perform general surgery procedures in addition to bariatric surgery (laparoscopic cholecystectomy, etc.)    4. Inability to tolerate textured food (chicken, steak, fish, eggs, beans) is NOT normal and may need to be evaluated by endoscopy performed by the bariatric surgeon.    _____________________________________________________________________________________________________________________________    Meal Replacement Products:    Here is the link to our new e-store where you can purchase our meal replacement products    [facility] [email or web address]    The one week starter kit is a great way to sample a variety of products and see what works for you.    If you want more information about the product go to: [employer]    Free Shipping for orders over $75     Benefits of meal replacements products:    Portion and calorie control  Improved nutrition  Structured eating  Simplified food choices  Avoid contact with trigger  foods  ______________________________________________________________________________________________________________________________    Healthy Lifestyle Support Group   [facility]  Virtual Support Group Now Available    60 minutes of small group guided discussion, support and resources    Led by Health , Martha Coates    For questions, and to receive the invite information, contact Martha at [email or web address]    All our welcome!    One Friday per month, 12:30pm to 1:30pm  SELF COMPASSION: July 31st  CREATING MY WELLNESS VISION: August 28th  MINDFULNESS PRACTICES FOR A CALM BODY & MIND: September 25th  MINDFUL EATING TOOLS: October 30th  HEALTHY& HAPPY HOLIDAYS: November 20th  OPEN FORUM: December 18th  _______________________________________________________________________________________________________________________________    Connections: Bariatric Care support group  Due to the Covid-19 restrictions, we will be doing our support group virtually using Microsoft Teams. You will need to get an invitation to the group from Michael Carias, Ph.D., LP at [email or web address] and to learn about using Microsoft Teams. The next meeting is on Tuesday, July 14 between 6:30 and 8 PM and there will be no formal speaker.    This group meets the second Tuesday of each month from 6:30 to 8 PM and will be held again at [facility] in the  Education [place] (A-B room) when the Covid-19 restrictions are lifted. The group is led by Michael Carias, Ph.D., Licensed Psychologist, [facility] Weight [facility]. There is no cost for group participation and is open to all [facility] (and those external to this program) pre- and post-operative bariatric surgery patients as well as their support system.    _________________________________________________________________________________________________________________________________      Interested in working with a health ?  Health coaches work with you to improve your overall health and wellbeing.  They look at the whole person, and may involve discussion of different areas of life, including, but not limited to the four pillars of health (sleep, exercise, nutrition, and stress management). Discuss with your care team if you would like to start working a health .     Health Coaching-3 Pack:      $99 for three health coaching visits    Visits may be done in person or via phone    Coaching is a partnership between the  and the client; Coaches do not prescribe or diagnose    Coaching helps inspire the client to reach his/her personal goals   __________________________________________________________________________________________________________________________________    [facility] Get Moving Program    Our team of physical therapists is trained to help you understand and take control of your condition. They will perform a thorough evaluation to determine your ability for activity and develop a customized plan to fit your goals and physical ability.  Scheduling: Unsure if the Get Moving program is right for you? Discuss the program with your medical provider or diabetes educator. You can also call us at [phone] to ask questions or schedule an appointment.   [facility] Get Moving Program  ______________________________________________________________________________________________________________________  Bluetooth Scale:    We hope to provide you with high quality telephone and virtual healthcare visits while social distancing for COVID-19 is necessary, as well as in the future when virtual visits may be more convenient for you.     Our technology team made it possible for [employer] scales to send weight measurements to our  electronic medical record. This allows weights from you weighing at home to securely flow into the medical record, which will improve telephone and virtual visits.   Additionally, studies have shown that adults actually lose more weight when their weights are automatically sent to someone else, and also that this process is not stressful for those adults.    Below is a link for purchasing the scale, with a discount code for our patients. You may call your insurance company to see if they will reimburse you for the cost of the scale, as a piece of durable medical equipment. The scales only go up to a weight of 400 pounds. This is an issue and we are working with the developer on increasing this. We found no scales that go over 400lb that have blue-tooth for connecting to [employer].    Scale to purchase: the Cerevo  Body  Scale: [email or web address]/body/[email or web address]=aw.ds    Discount Code: We have a discount code for our patients to bring the cost down to $50, the code is: [place]_Scale_20%off    Steps to link the scale to [employer] via an Android Phone (you can always disconnect at any point in the future):  1. The order must be placed first before the patient can access Track My Health within [employer].  2. Download Google Fit vin from the Google Play Store   a. Log in or register using your Google account   3. Download the [employer] vin from Google Play Store  a. Select add organization   b. Search for [employer] and select it   c. Log into [employer]  d. Select Track My Health   e. Select the green connect my account button   f. When prompted log into your Google account   g. Select okay to confirm the account   4. Download the [employer] Health Mate vin from Google Play Store  a. [place] for Cerevo   b. Go to profile   c. Tap google fit under the Apps section  d. Select the option to activate Google Fit integration   e. Select the same Google  account   f. Select okay to confirm the account  g.   Steps to link the scale to play140 via an iPhone (you can always disconnect at any point in the future):  **Note FamilyLeaf is not available for download on an iPad**  1. The order must be placed first before the patient can access Track My Health within play140.  2. Locate the Health saulo on your iPhone.  a. Set up your Apple Health account as prompted  b. The Sources page will show Apps that communicate with your Health saulo. Once all steps are completed, you should see Runtastic and Rezziet listed under the Apps section and your iPhone under the devices section.  i. Select Health Mate  1. Under 'ALLOW  Adspert | [employer]  TO WRITE DATA ensure the toggle is on for Weight.  2. This will allow the scale to add your weight to the Apple Health  ii. Select MyChart  1. Under 'ALLOW  iXpert  TO READ DATA ensure the toggle is on for Weight.  2. This allows play140 to grab the weight from FamilyLeaf so your provider can see your weights.  3. Download the play140 saulo from the Saulo Store   a. Select add organization                                                  b. Search for DocOnYou and select it  c. Log into play140  d. Select Track My Health   e. Select the green connect my account button   f. Follow prompts to link your device to play140.  4. Download the [employer] health mate saulo in the Saulo Store   a. [place] for SP3H   b. Go to profile   c. [employer] under the Apps section  d. If prompted to allow access with the Health Saulo, toggle weight on for read and write access.

## 2021-07-15 ENCOUNTER — TELEPHONE (OUTPATIENT)
Dept: ENDOCRINOLOGY | Facility: CLINIC | Age: 56
End: 2021-07-15

## 2021-07-15 NOTE — TELEPHONE ENCOUNTER
LVMx1 to schedule 3 month follow up with Sunitha Galvan or another registered dietician. Left Weight [facility] call number and sent MyC.

## 2021-08-13 ENCOUNTER — TRANSFERRED RECORDS (OUTPATIENT)
Dept: HEALTH INFORMATION MANAGEMENT | Facility: CLINIC | Age: 56
End: 2021-08-13

## 2021-08-15 ENCOUNTER — HEALTH MAINTENANCE LETTER (OUTPATIENT)
Age: 56
End: 2021-08-15

## 2021-10-11 ENCOUNTER — HEALTH MAINTENANCE LETTER (OUTPATIENT)
Age: 56
End: 2021-10-11

## 2021-10-13 ENCOUNTER — VIRTUAL VISIT (OUTPATIENT)
Dept: ENDOCRINOLOGY | Facility: CLINIC | Age: 56
End: 2021-10-13
Payer: COMMERCIAL

## 2021-10-13 VITALS — WEIGHT: 230 LBS | HEIGHT: 68 IN | BODY MASS INDEX: 34.86 KG/M2

## 2021-10-13 DIAGNOSIS — B37.2 INTERTRIGINOUS CANDIDIASIS: ICD-10-CM

## 2021-10-13 DIAGNOSIS — E66.01 MORBID OBESITY (H): ICD-10-CM

## 2021-10-13 DIAGNOSIS — Z98.84 S/P LAPAROSCOPIC SLEEVE GASTRECTOMY: Primary | ICD-10-CM

## 2021-10-13 PROCEDURE — 99214 OFFICE O/P EST MOD 30 MIN: CPT | Mod: GT | Performed by: NURSE PRACTITIONER

## 2021-10-13 RX ORDER — NYSTATIN 100000 [USP'U]/G
POWDER TOPICAL 2 TIMES DAILY PRN
Qty: 60 G | Refills: 1 | Status: SHIPPED | OUTPATIENT
Start: 2021-10-13

## 2021-10-13 RX ORDER — POLYETHYLENE GLYCOL 3350 17 G/17G
1 POWDER, FOR SOLUTION ORAL
COMMUNITY
Start: 2021-08-13

## 2021-10-13 ASSESSMENT — MIFFLIN-ST. JEOR: SCORE: 1683.83

## 2021-10-13 ASSESSMENT — PAIN SCALES - GENERAL: PAINLEVEL: MODERATE PAIN (5)

## 2021-10-13 NOTE — ASSESSMENT & PLAN NOTE
6 months post sleeve. Doing great.     Constipation improving with miralax twice daily. Increased flatus in the last 3 weeks, not correlated to any new changes. Followed by GI.   Reflux well controlled with omeprazole once daily     Continues to lose weight steadily. Eating 3 meals a day- about 1/2 cup, and protein shake. RD follow up next week.     Continues to struggle with not being able to see the changes in the mirror but improving over time, getting used to her new look. discussed option to see therapist if needed, declines for now     Follow up with labs in 6 months

## 2021-10-13 NOTE — PROGRESS NOTES
Return Bariatric Surgery Note    RE: Stephanie Sanches  MR#: [id number]  : 1965  VISIT DATE: Oct 13, 2021      Dear Benson Allen,    I had the pleasure of seeing your patient, Stephanie Sanches, in my post-bariatric surgery assessment clinic.    Assessment & Plan   Problem List Items Addressed This Visit        Digestive    Morbid obesity (H)    Relevant Medications    nystatin (MYCOSTATIN) 023542 UNIT/GM external powder       Other    S/P laparoscopic sleeve gastrectomy - Primary     6 months post sleeve. Doing great.     Constipation improving with miralax twice daily. Increased flatus in the last 3 weeks, not correlated to any new changes. Followed by GI.   Reflux well controlled with omeprazole once daily     Continues to lose weight steadily. Eating 3 meals a day- about 1/2 cup, and protein shake. RD follow up next week.     Continues to struggle with not being able to see the changes in the mirror but improving over time, getting used to her new look. discussed option to see therapist if needed, declines for now     Follow up with labs in 6 months            Relevant Medications    nystatin (MYCOSTATIN) 965936 UNIT/GM external powder      Other Visit Diagnoses     Intertriginous candidiasis        Relevant Medications    nystatin (MYCOSTATIN) 112186 UNIT/GM external powder             35 minutes spent on the date of the encounter doing chart review, history and exam, documentation and further activities per the note    CHIEF COMPLAINT: Post-bariatric surgery follow-up    HISTORY OF PRESENT ILLNESS:  Questions Regarding Prior Weight Loss Surgery Reviewed With Patient 10/6/2021   I had the following weight loss procedure: Sleeve Gastrectomy   What year was your surgery?    How has your weight changed since your last visit? I have lost weight   Are you currently taking any weight loss medications? No   Do you currently have any of the following: None of the above   Have you been to the Emergency  room since your last visit with us? No   Were you in the hospital since your last visit with us? No   Do you have any concerns today? N/A     Sleeve 4/2021 Dr. Zambrano   Last seen 7/2021   Now, 6 months post op    Reflux- restarted omeprazole at last visit due to increased throat burning when going off - continues omeprazole with good control of symptoms     Increased flatus and stomach rumbling. Starting 3 weeks ago. No particular time of day. Nearly every day. Almost feels like she has to go to the bathroom but isnt able to go. No changes in diet or eating habits.   Constipation- miralax every other day, probiotic, recommended miralax daily and adding benefiber. Since then, has seen GI and recommended miralax twice daily and stools have improved.       Difficulty seeing weight loss in mirror but can see it when she puts clothes on, improving over time   Hydration improving     Weight loss continues to be steady. No plateaus. Still having some disinterest in food. Eating 1/2 cup 3 times a day plus a protein shake     Weight History:     10/6/2021   What is your highest lifetime weight? 385   What is your lowest weight since surgery? (In pounds) 232     Initial Weight (lbs): 326 lbs  Weight: 104.3 kg (230 lb)  Last Visits Weight: 117.9 kg (260 lb)  Cumulative weight loss (lbs): 96  Weight Loss Percentage: 29.45%    Questions Regarding Co-Morbidities and Health Concerns Reviewed With Patient 10/6/2021   Pre-diabetes: Never   Diabetes II: Gone away   What was your most recent hemoglobin a1c  -   How many years have you had diabetes? -   High Blood Pressure: Never   High cholesterol: Never   Heartburn/Reflux: Improved   Are you taking daily medication for heartburn, acid reflux, or GERD (acid reflux disease)? -   Sleep apnea: Never   PCOS: Never   Back pain: Stayed the same   Joint pain: Improved   Lower leg swelling: Gone away       Eating Habits 10/6/2021   How many meals do you eat per day? 3   Do you snack  between meals? No   How much food are you eating at each meal? Less than 1/2 cup   Are you able to separate your meals and liquids by at least 30 minutes? Yes   Are you able to avoid liquid calories? Yes       Exercise Questions Reviewed With Patient 10/6/2021   How often do you exercise? 5 to 6 times per week   What is the duration of your exercise (in minutes)? 30 Minutes   What types of exercise do you do? walking, weightlifting, other   What keeps you from being more active?  I am as active as I can possbily be       Social History:      10/6/2021   Are you smoking? No   Are you drinking alcohol? No       Medications:  Current Outpatient Medications   Medication     calcium citrate-vitamin D (CALCIUM CITRATE +) 315-200 MG-UNIT TABS per tablet     Cetaphil Moisturizing (CETAPHIL) external lotion     cholecalciferol 25 MCG (1000 UT) CHEW     multivitamin w/minerals (MULTI-VITAMIN) tablet     nystatin (MYCOSTATIN) 992714 UNIT/GM external powder     omeprazole (PRILOSEC) 20 MG DR capsule     polyethylene glycol (MIRALAX) 17 GM/Dose powder     Probiotic Product (PROBIOTIC-10 PO)     Thiamine HCl (B-1 PO)     No current facility-administered medications for this visit.         10/6/2021   Do you avoid NSAIDs such as (Ibuprofen, Aleve, Naproxen, Advil)?   Yes       ROS:  GI:      10/6/2021   Vomiting: No   Diarrhea: No   Constipation: No   Swallowing trouble: No   Abdominal pain: No   Heartburn: No   Rash in skin folds: Yes   Depression: No   Stress urinary incontinence No     Skin:   BAR RBS ROS - SKIN 10/6/2021   Rash in skin folds: Yes     Psych:      10/6/2021   Depression: No   Anxiety: No     Female Only:   BAR RBS ROS - FEMALE ONLY 10/6/2021   Female only: None of the above     Labs in Care everywhere from 7/6/2021  CBC normal  CMP normal   A1C 5.6  PTH 53, Vit D 44  B12 600  Vitamin A (not done)     Transferred Records on 05/11/2021   Component Date Value Ref Range Status     TSH 05/11/2021 3.02  0.40 - 3.99  "uIU/mL Final     Cholesterol 05/11/2021 164  114 - 200 mg/dL Final     Triglycerides 05/11/2021 133  10 - 200 mg/dL Final     HDL Cholesterol 05/11/2021 50  40 - 60 mg/dL Final     LDL Cholesterol Calculated 05/11/2021 87  <100 mg/dL Final     Potassium 05/11/2021 3.9  3.4 - 5.1 mEq/L Final     Creatinine 05/11/2021 0.78  0.40 - 1.00 mg/dL Final     GFR Estimate 05/11/2021 >60  >60 ml/min/1.73m2 Final     Glucose 05/11/2021 126* 70 - 99 mg/dL Final     AST 05/11/2021 22  10 - 40 IU/L Final     ALT 05/11/2021 19  6 - 31 IU/L Final     Hemoglobin A1C 05/11/2021 5.8* 4.0 - 5.6 % Final         PHYSICAL EXAM:  Objective    Ht 1.715 m (5' 7.5\")   Wt 104.3 kg (230 lb)   BMI 35.49 kg/m    Vitals - Patient Reported  Pain Score: Moderate Pain (5)  Pain Loc: Shoulder      Vitals:  No vitals were obtained today due to virtual visit.    Physical Exam   GENERAL: Healthy, alert and no distress  EYES: Eyes grossly normal to inspection.  No discharge or erythema, or obvious scleral/conjunctival abnormalities.  RESP: No audible wheeze, cough, or visible cyanosis.  No visible retractions or increased work of breathing.    SKIN: Visible skin clear. No significant rash, abnormal pigmentation or lesions.  NEURO: Cranial nerves grossly intact.  Mentation and speech appropriate for age.  PSYCH: Mentation appears normal, affect normal/bright, judgement and insight intact, normal speech and appearance well-groomed.        Sincerely,    Farida Seymour NP    "

## 2021-10-13 NOTE — LETTER
10/13/2021       RE: Stephanie Sanches  [address]  [place] [address]     Dear Colleague,    Thank you for referring your patient, Stephanie Sanches, to the [facility] [place] at [facility]. Please see a copy of my visit note below.    Return Bariatric Surgery Note    RE: Stephanie Sanches  MR#: [id number]  : 1965  VISIT DATE: Oct 13, 2021      Dear Benson Allen,    I had the pleasure of seeing your patient, Stephanie Sanches, in my post-bariatric surgery assessment clinic.    Assessment & Plan   Problem List Items Addressed This Visit        Digestive    Morbid obesity (H)    Relevant Medications    nystatin (MYCOSTATIN) 032911 UNIT/GM external powder       Other    S/P laparoscopic sleeve gastrectomy - Primary     6 months post sleeve. Doing great.     Constipation improving with miralax twice daily. Increased flatus in the last 3 weeks, not correlated to any new changes. Followed by GI.   Reflux well controlled with omeprazole once daily     Continues to lose weight steadily. Eating 3 meals a day- about 1/2 cup, and protein shake. RD follow up next week.     Continues to struggle with not being able to see the changes in the mirror but improving over time, getting used to her new look. discussed option to see therapist if needed, declines for now     Follow up with labs in 6 months            Relevant Medications    nystatin (MYCOSTATIN) 355838 UNIT/GM external powder      Other Visit Diagnoses     Intertriginous candidiasis        Relevant Medications    nystatin (MYCOSTATIN) [id number] UNIT/GM external powder             35 minutes spent on the date of the encounter doing chart review, history and exam, documentation and further activities per the note    CHIEF COMPLAINT: Post-bariatric surgery follow-up    HISTORY OF PRESENT ILLNESS:  Questions Regarding Prior Weight Loss Surgery Reviewed With Patient 10/6/2021    I had the following weight loss procedure: Sleeve Gastrectomy   What year was your surgery? 2021   How has your weight changed since your last visit? I have lost weight   Are you currently taking any weight loss medications? No   Do you currently have any of the following: None of the above   Have you been to the Emergency room since your last visit with us? No   Were you in the hospital since your last visit with us? No   Do you have any concerns today? N/A     Sleeve 4/2021 Dr. Zambrano   Last seen 7/2021   Now, 6 months post op    Reflux- restarted omeprazole at last visit due to increased throat burning when going off - continues omeprazole with good control of symptoms     Increased flatus and stomach rumbling. Starting 3 weeks ago. No particular time of day. Nearly every day. Almost feels like she has to go to the bathroom but isnt able to go. No changes in diet or eating habits.   Constipation- miralax every other day, probiotic, recommended miralax daily and adding benefiber. Since then, has seen GI and recommended miralax twice daily and stools have improved.       Difficulty seeing weight loss in mirror but can see it when she puts clothes on, improving over time   Hydration improving     Weight loss continues to be steady. No plateaus. Still having some disinterest in food. Eating 1/2 cup 3 times a day plus a protein shake     Weight History:     10/6/2021   What is your highest lifetime weight? 385   What is your lowest weight since surgery? (In pounds) 232     Initial Weight (lbs): 326 lbs  Weight: 104.3 kg (230 lb)  Last Visits Weight: 117.9 kg (260 lb)  Cumulative weight loss (lbs): 96  Weight Loss Percentage: 29.45%    Questions Regarding Co-Morbidities and Health Concerns Reviewed With Patient 10/6/2021   Pre-diabetes: Never   Diabetes II: Gone away   What was your most recent hemoglobin a1c  -   How many years have you had diabetes? -   High Blood Pressure: Never   High cholesterol: Never    Heartburn/Reflux: Improved   Are you taking daily medication for heartburn, acid reflux, or GERD (acid reflux disease)? -   Sleep apnea: Never   PCOS: Never   Back pain: Stayed the same   Joint pain: Improved   Lower leg swelling: Gone away       Eating Habits 10/6/2021   How many meals do you eat per day? 3   Do you snack between meals? No   How much food are you eating at each meal? Less than 1/2 cup   Are you able to separate your meals and liquids by at least 30 minutes? Yes   Are you able to avoid liquid calories? Yes       Exercise Questions Reviewed With Patient 10/6/2021   How often do you exercise? 5 to 6 times per week   What is the duration of your exercise (in minutes)? 30 Minutes   What types of exercise do you do? walking, weightlifting, other   What keeps you from being more active?  I am as active as I can possbily be       Social History:      10/6/2021   Are you smoking? No   Are you drinking alcohol? No       Medications:  Current Outpatient Medications   Medication     calcium citrate-vitamin D (CALCIUM CITRATE +) 315-200 MG-UNIT TABS per tablet     Cetaphil Moisturizing (CETAPHIL) external lotion     cholecalciferol 25 MCG (1000 UT) CHEW     multivitamin w/minerals (MULTI-VITAMIN) tablet     nystatin (MYCOSTATIN) 365208 UNIT/GM external powder     omeprazole (PRILOSEC) 20 MG DR capsule     polyethylene glycol (MIRALAX) 17 GM/Dose powder     Probiotic Product (PROBIOTIC-10 PO)     Thiamine HCl (B-1 PO)     No current facility-administered medications for this visit.         10/6/2021   Do you avoid NSAIDs such as (Ibuprofen, Aleve, Naproxen, Advil)?   Yes       ROS:  GI:      10/6/2021   Vomiting: No   Diarrhea: No   Constipation: No   Swallowing trouble: No   Abdominal pain: No   Heartburn: No   Rash in skin folds: Yes   Depression: No   Stress urinary incontinence No     Skin:   BAR RBS ROS - SKIN 10/6/2021   Rash in skin folds: Yes     Psych:      10/6/2021   Depression: No   Anxiety: No  "    Female Only:   BAR RBS ROS - FEMALE ONLY 10/6/2021   Female only: None of the above     Labs in Care everywhere from 7/6/2021  CBC normal  CMP normal   A1C 5.6  PTH 53, Vit D 44  B12 600  Vitamin A (not done)     Transferred Records on 05/11/2021   Component Date Value Ref Range Status     TSH 05/11/2021 3.02  0.40 - 3.99 uIU/mL Final     Cholesterol 05/11/2021 164  114 - 200 mg/dL Final     Triglycerides 05/11/2021 133  10 - 200 mg/dL Final     HDL Cholesterol 05/11/2021 50  40 - 60 mg/dL Final     LDL Cholesterol Calculated 05/11/2021 87  <100 mg/dL Final     Potassium 05/11/2021 3.9  3.4 - 5.1 mEq/L Final     Creatinine 05/11/2021 0.78  0.40 - 1.00 mg/dL Final     GFR Estimate 05/11/2021 >60  >60 ml/min/1.73m2 Final     Glucose 05/11/2021 126* 70 - 99 mg/dL Final     AST 05/11/2021 22  10 - 40 IU/L Final     ALT 05/11/2021 19  6 - 31 IU/L Final     Hemoglobin A1C 05/11/2021 5.8* 4.0 - 5.6 % Final         PHYSICAL EXAM:  Objective    Ht 1.715 m (5' 7.5\")   Wt 104.3 kg (230 lb)   BMI 35.49 kg/m    Vitals - Patient Reported  Pain Score: Moderate Pain (5)  Pain Loc: Shoulder      Vitals:  No vitals were obtained today due to virtual visit.    Physical Exam   GENERAL: Healthy, alert and no distress  EYES: Eyes grossly normal to inspection.  No discharge or erythema, or obvious scleral/conjunctival abnormalities.  RESP: No audible wheeze, cough, or visible cyanosis.  No visible retractions or increased work of breathing.    SKIN: Visible skin clear. No significant rash, abnormal pigmentation or lesions.  NEURO: Cranial nerves grossly intact.  Mentation and speech appropriate for age.  PSYCH: Mentation appears normal, affect normal/bright, judgement and insight intact, normal speech and appearance well-groomed.        Sincerely,    GREGOR Arguelles is a 56 year old who is being evaluated via a billable video visit.      How would you like to obtain your AVS? MyChart  If the video visit is dropped, the " invitation should be resent by: Text to cell phone: [phone]  Will anyone else be joining your video visit? No      Video Start Time: 1456  Video-Visit Details    Type of service:  Video Visit    Video End Time:1517    Originating Location (pt. Location): Home    Distant Location (provider location):  [facility] [place]     Platform used for Video Visit: [employer] (formerly [employer])

## 2021-10-13 NOTE — PROGRESS NOTES
Lorena is a 56 year old who is being evaluated via a billable video visit.      How would you like to obtain your AVS? [employer]  If the video visit is dropped, the invitation should be resent by: Text to cell phone: [phone]  Will anyone else be joining your video visit? No      Video Start Time: 1456  Video-Visit Details    Type of service:  Video Visit    Video End Time:1517    Originating Location (pt. Location): Home    Distant Location (provider location):  [facility] [place]     Platform used for Video Visit: [employer]

## 2021-10-13 NOTE — NURSING NOTE
"Chief Complaint   Patient presents with     Consult     6 month post op.       Vitals:    10/13/21 1436   Weight: 104.3 kg (230 lb)   Height: 1.715 m (5' 7.5\")       Body mass index is 35.49 kg/m .                          Jessie Zhong, EMT    "

## 2021-10-13 NOTE — PATIENT INSTRUCTIONS
"Thank you for allowing us the privilege of caring for you. We hope we provided you with the excellent service you deserve.   Please let us know if there is anything else we can do for you so that we can be sure you are completely satisfied with your care experience.    To ensure the quality of our services you may be receiving a patient satisfaction survey from an independent patient satisfaction monitoring company.    The greatest compliment you can give is a \"Likely to Recommend\"    Your visit was with Farida Seymour NP today.    Instructions per today's visit:     Rickey Sanches, it was great to visit with you today.  Here is a review of our visit.  If our clinic scheduler is not able to reach you please call [phone] to schedule your next appointments.    -follow up in 6 months with labs, sooner if needed  -continue omeprazole daily   -continue miralax daily, follow up with GI if symptoms persist         Information about Video Visits with Pro[employer]o [employer] AudioCompass: video visit information  _________________________________________________________________________________________________________________________________________________________  Important contact and scheduling information:  Please call our contact center at [phone] to schedule your next appointments.  To find a lab location near you, please call [phone].  For any nursing questions or concerns call Ynes Osborne LPN at [phone] or Ruth Arora RN at [phone]  Please call during clinic hours Monday through Friday 8:00a - 4:00p if you have questions or you can contact us via Chat Sports at anytime and we will reply during clinic hours.    Lab results will be communicated through My Chart or letter (if My Chart not used). Please call the clinic if you have not received communication after 1 week or if you have any questions.?  Clinic Fax: " [phone]  _________________________________________________________________________________________________________________________________________________________  Meal Replacement Products:    Here is the link to our new e-store where you can purchase our meal replacement products     [facility] E-Store  [employer].KillerStartups/store    The one week starter kit is a great way to sample a variety of products and see what works for you.    If you want more information about the product go to: Fresh Steps Meals  Mapittrackit.Acusphere    If you are an employee or [facility] or  [facility] please contact your care team for a 10% estore discount    Free Shipping for orders over $75     Benefits of meal replacements products:    Portion and calorie control  Improved nutrition  Structured eating  Simplified food choices  Avoid contact with trigger foods  _________________________________________________________________________________________________________________________________________________________  Interested in working with a health ?  Health coaches work with you to improve your overall health and wellbeing.  They look at the whole person, and may involve discussion of different areas of life, including, but not limited to the four pillars of health (sleep, exercise, nutrition, and stress management). Discuss with your care team if you would like to start working a health .  Health Coaching-3 Pack: Schedule by calling [phone]    $99 for three health coaching visits    Visits may be done in person or via phone    Coaching is a partnership between the  and the client; Coaches do not prescribe or diagnose    Coaching helps inspire the client to reach his/her personal goals   _________________________________________________________________________________________________________________________________________________________  24 Week Healthy Lifestyle Plan:    Our  mission in the 24-week Healthy Lifestyle Plan is to provide you with individualized care by giving you the tools, education and support you need to lose weight and maintain a healthy lifestyle. In your 24-week journey, you ll be supported by a dedicated weight loss team that includes registered dietitians, medical weight management providers, health coaches, and nurses -- all with special expertise in weight loss -- to help you every step of the way.     Monthly meetings with your registered dietician or medical weight management provider help to review your progress, update your care plan, and make any adjustments needed to ensure success. Between these visits, weekly and bi-weekly health  visits will help you focus on the four pillars of weight loss -- stress, sleep, nutrition, and exercise -- and how you can best adapt each to achieve sustainable weight loss results.    In addition, you will be given exclusive access to online wellbeing classes through InboundWriter.  Your initial visit will be with a medical weight management provider who will help to understand your weight loss goals and ensure this program is the right fit for you. Please let our team know if you are interested in the 24 week plan by sending a message to your care team or calling [phone] to schedule.  _________________________________________________________________________________________________________________________________________________________    COMPREHENSIVE WEIGHT MANAGEMENT PROGRAM  VIRTUAL SUPPORT GROUPS    For Support Group Information:      We offer support groups for patients who are working on weight loss and considering, preparing for or have had weight loss surgery.   There is no cost for this opportunity.  You are invited to attend the?Virtual Support Groups?provided by any of the following locations:    1. [employer] via kites.io Teams with Dianne Dias RN  2.   [employer] via kites.io Teams with Michael Carias, PhD,  "  3.   [place] via Anzhi.com Teams with Martha Capone RN  4.   [facility] via Anzhi.com Teams with AYAN [facility]    The following Support Group information can also be found on our website:  [email or web address]      [facility] Weight Loss Surgery Support Group    [facility] Weight Loss Surgery Support Group  The support group is a patient-lead forum that meets monthly to share experiences, encouragement and education. It is open to those who have had weight loss surgery, are scheduled for surgery, and those who are considering surgery.   WHEN: This group meets on the 3rd Wednesday of each month from 5:00PM - 6:00PM virtually using Microsoft Teams.   FACILITATOR: Led by Dianne Dias, the program's Clinical Coordinator.   TO REGISTER: Please contact the clinic via [employer] or call the nurse line directly at [phone] to inform our staff that you would like an invite sent to you and to let us know the email you would like the invite sent to. Prior to the meeting, a link with directions on how to join the meeting will be sent to you.    2021 Meetings  August 18: \"Let's Talk\" a time for the group to share.  September 15: \"Let's Talk\" a time for the group to share.  October 20: \"Let's Talk\" a time for the group to share.  November 17: Lelia Pruitt RD, TANIA \"Protein, Metabolism and Meal Planning\"  December 15: Kenji Arzola RD, LD will speak, \"Recipe Modification\"    [facility] and [facility] Support Groups    Connections: Bariatric Care Support Group?  This is open to all [facility] (and those external to this program) pre- and post- operative bariatric surgery patients as well as their support system.   WHEN: This group meets the 2nd Tuesday of each month from 6:30 PM - 8:00 PM virtually using Microsoft Teams.   FACILITATOR: Led by Michael Carias, Ph.D who is a Licensed " "Psychologist with the [facility] Comprehensive Weight Management Program.   TO REGISTER: Please send an email to Michael Carias, Ph.D., LP at?[email or web address]?if you would like an invitation to the group and to learn about using Microsoft Teams.    2021 Meetings  August 10: Open Forum  September 14: Guest Speaker: Martha Capone RN,CBN, CIC, [facility] Comprehensive Weight Management Program, \"Your Hospital Stay and Post-Operative Compliance\"  October 12: Open Forum  November 9: Guest Speaker: Cleo House RD,LD, [facility] Comprehensive Weight Management Program,\"Holiday Eating\".  December 14: Guest Speaker Ronel Ford MD, MPH, [facility], [facility], \"Body Contouring Surgery for Bariatric Surgery Patients\"     Connections: Post-Operative Bariatric Surgery Support Group  This is a support group for [facility] bariatric patients (and those external to [facility]) who have had bariatric surgery and are at least 3 months post-surgery.  WHEN: This support group meets the 4th Wednesday of the month from 11:00 AM - 12:00 PM virtually using Microsoft Teams.   FACILITATOR: Led by Certified Bariatric Nurse, Martha Capone RN.   TO REGISTER: Please send an email to Martha at [email or web address] if you would like an invitation to the group and to learn about using myhub.      [facility] Healthy Lifestyle Virtual Support Group    Healthy Lifestyle Virtual Support Group?  This is 60 minutes of small group guided discussion, support and resources. All are welcome who want a healthy lifestyle.  WHEN: This group meets monthly on a Friday from 12:30 PM - to 1:30 PM virtually using Microsoft Teams.   FACILITATOR: Led by National Board Certified Health , Martha Coates, [facility].   TO REGISTER: Please send an email to Martha [email or web address] to receive monthly invites to the group or if you have any questions " about having a health .  Prior to the meeting, a link with directions on how to join the meeting will be sent to you.    2021 Meetings  August 27: Open Forum  September 24: Sleep and the 7 Types of Rest  October 29: Open Forum  November 19: Gratitude     December 10: Open Forum  _________________________________________________________________________________________________________________________________________________________  [place] [facility] Get Moving Program  Our team of physical therapists is trained to help you understand and take control of your condition. They will perform a thorough evaluation to determine your ability for activity and develop a customized plan to fit your goals and physical ability.  Scheduling: Unsure if the Get Moving program is right for you? Discuss the program with your medical provider or diabetes educator. You can also call us at [phone] to ask questions or schedule an appointment.   [facility] Get Moving Program  _________________________________________________________________________________________________________________________________________________________  [facility] Diabetes Prevention Program (DPP)  If you have prediabetes and Medicare please contact us via CityHeroeshart to learn more about the Diabetes Prevention Program (DPP)  Program Details:  [facility] offers the year-long Diabetes Prevention Program (DPP). The program helps you to make lifestyle changes that prevent or delay type 2 diabetes by supporting healthy eating, increased physical activity, stress reduction and use of coping skills.   On average, previous [facility] DPP cohorts have lost and maintained at least 5% of their starting weight throughout the program and averaged more than 150 minutes of physical activity per week.  Participants meet weekly for one-hour group sessions over sixteen weeks, every other week for the next 8 weeks, and monthly for the last six  months.   A year-long maintenance program is also available for participants who complete the first year.   Location & Cost:   During the COVID-19 Public Health Emergency, the program is offered virtually. When in-person classes can resume, they will be held at [facility].  For people with Medicare, the program is covered in full. A self-pay option will also be available for those with non-Medicare insurance plans.   _________________________________________________________________________________________________________________________________________________________  Bluetooth Scale:    We hope to provide you with high quality virtual healthcare visits while social distancing for COVID-19 is necessary, as well as in the future when virtual visits may be more convenient for you.     Our technology team made it possible for Bluetooth scales to send weight measurements to our electronic medical record. This allows weights from you weighing at home to securely flow into the medical record, which will improve telephone and virtual visits.   Additionally, studies have shown that adults actually lose more weight when their weights are automatically sent to someone else, and also that this process is not stressful for those adults.    Below is a link for purchasing the scale, with a discount code for our patients. You may call your insurance company to see if they will reimburse you for the cost of the scale, as a piece of durable medical equipment. The scales only go up to a weight of 400 pounds. This is an issue and we are working with the developer on increasing this. We found no scales that go over 400lb that have blue-tooth for connecting to [employer].    Scale to purchase: the Project Playlist  Body  Scale: https://[email or web address].Kinnek/us/en/body/shop?gclid=[email or web address]&gclsrc=aw.ds    Discount Code: We have a discount code for our patients to bring the  cost down to $50, Discount code is: [place]_Scale_20%off      Thank you,   [facility] Comprehensive Weight Management Team

## 2021-10-18 NOTE — PROGRESS NOTES
"Stephanie Sanches is a 55 year old female who is being evaluated via a billable video visit.      The patient has been notified of following:     \"This video visit will be conducted via a call between you and your physician/provider. We have found that certain health care needs can be provided without the need for an in-person physical exam.  This service lets us provide the care you need with a video conversation.  If a prescription is necessary we can send it directly to your pharmacy.  If lab work is needed we can place an order for that and you can then stop by our lab to have the test done at a later time.    Video visits are billed at different rates depending on your insurance coverage.  Please reach out to your insurance provider with any questions.    If during the course of the call the physician/provider feels a video visit is not appropriate, you will not be charged for this service.\"    Patient has given verbal consent for Video visit? Yes  How would you like to obtain your AVS? MyChart  If you are dropped from the video visit, the video invite should be resent to: Text to cell phone: [phone]  Will anyone else be joining your video visit? No   {If patient encounters technical issues they should call [phone]      Video-Visit Details    Type of service:  Video Visit    Video Start Time: 3:20 PM  Video End Time: 3:41 PM    Originating Location (pt. Location): Home    Distant Location (provider location):  [facility] [place]     Platform used for Video Visit: Kupoya    During this virtual visit the patient is located in [place], patient verifies this as the location during the entirety of this visit.     Nutrition Reassessment  Reason For Visit:  Stephanie Sanches is a 55 year old female presenting today for nutrition follow-up, 6 month s/p Sleeve Gastrectomy with Dr Zambrano (4/5/2021).  Patient referred by Farida Seymour.    Patient with Co-morbidities of obesity " "including:  Type II DM yes (HgbA1c: 5.8 at last check on 5/11/21)  Renal Failure no  Sleep apnea no  Hypertension no   Dyslipidemia no  Joint pain no  Back pain no  GERD no      Pt's history is also significant for NAM, followed by GI     Anthropometrics:  Initial Consult Weight: 336 lbs  Day of Surgery Weight (4/5/21): 308.7 lbs    Current Weight:   Estimated body mass index is 35.49 kg/m  as calculated from the following:    Height as of 10/13/21: 1.715 m (5' 7.5\").    Weight as of 10/13/21: 104.3 kg (230 lb). (-106 lbs from initial, - 78 lbs from day of surgery    Current Vitamins/Minerals:   MVI with Iron 18 mg BID (Equate children's chewable)  ashu melt calcium 500 BID   ashu melts B12 1000mcg once a day  D3 5000 international unit(s) once day  B1 Ashu melt 12.5 mg once day  ashu probiotic 1 day    Taking omeprazole in am (separate time from MVI with iron).  Taking stool softner.     Nutrition History:  Tolerating regular bariatric diet well. Focuses on high protein meals - High-pro yogurt, chicken. Eating 1/4-1/2 cup sized meals. Daily protein shake. Did not tolerate broccoli. Reports taste changes, foods not tasting as good as before surgery. Experimenting with new foods and recipes.     Progress with Previous Goals:  1) Follow bariatric regular diet. - Met, continues  2) Consume 60 grams of protein/day. - Met, continues  3) Sip on 48-64 oz of fluids/day- between meals only. - Met, continues - Met, continues   4) Eat slowly (>20 min/meal), chewing foods well (to applesauce-like consistency). - Met, continues   5) Limit portions to 1/2 cup/meal. - Met, continues  6) Continue the following after a Sleeve Gastrectomy:    Multivitamin/minerals: adult dose 2 times daily    Iron: 45-60 mg elemental (18-36 mg if low risk) - may partly or fully be covered in multivitamin     Calcium Citrate containing vitamin D: 500 mg 3 times daily or 600 mg 2 times daily    Vitamin B12: sublingual form of at least 500 mcg daily or " "injection of 1000 mcg monthly     B-50 Complex once daily     Physical Activity: Back pain lately, seeing a chiropracter and was given some activties.     Nutrition Prescription:  Grams Protein: 60 (minimum)   Amount of Fluid: 48-64 oz    Nutrition Diagnosis  Previous: Food and nutrition-related knowledge deficit r/t lack of prior exposure to diet instruction beyond 3 months s/p SG as evidenced by Pt seeking further guidance from RD on diet instruction beyond 3 months s/p SG. - resolved    Current: Food and nutrition-related knowledge deficit r/t lack of prior exposure to diet instruction beyond 6 months s/p SG as evidenced by Pt seeking further guidance from RD on diet instruction beyond 6 months s/p SG.     Intervention  Materials/Education provided, reviewed bariatric regular consistency diet, protein intake, fluid intake, eating pace, chewing foods well, portion control, sugar/fat intake, recommended vitamin/mineral supplements. Discussed ways to flavor foods without added calories, and post-op recipes resources. Praised pt on the positive diet and lifestyle change she has maintained since surgery.     Expected Engagement: good    Goals:  1) Consume 60 grams of protein/day.  2) Sip on 48-64 oz of fluids/day- between meals only.  3) Eat slowly (>20 min/meal), chewing foods well (to applesauce-like consistency).  4) Continue current vitamin/mineral supplements  5) Increase physical activity as safely able    Keeping Up Your Diet after Weight Loss Surgery  [email or web address]    Exercises after Weight Loss Surgery (strengthening, when no weight lifting restrictions)  [email or web address]    Tools for after surgery:  Jobinasecond Dish Sets: bariatric meal size bowls and plates with built in measurement designs on the dishes    Bariatric Pal: https://store.Sitemasherpal.com/collections/bariatric-dinnerware    Books:  \"Fresh Start Bariatric Cookbook\" by Lauren Kessler, MS, RDN, CD - $13 - Excellent " "cookbook with post-op recipes and many other resources to check out for ongoing support after surgery    \"Eating Well After Weight Loss Surgery\" by Ericka Devine and Jesus Alberto Colby - $10 - Great cookbook with recipes for each diet stage after surgery and recommended portion sizes. Slightly more intensive cooking recipes.    \"Bariatric Mindset Success\" by Irene Stephen - $14.24 on Amazon - book that helps with prevention of weight regain after surgery. Helps train your brain for long term success with weight loss after surgery.    Post-Bariatric Surgery Online Recipe Resources:  Online:    Simpler Recipes: https://www.Anedot/bariatric-surgery/recipes    [place]on cups and muffin tin portion sized recipes: https://www.Netlog/blog/10-single-serving-meals-you-need-in-your-bariatric-life/    Some recipes on this site have larger than 1 cup portion size pictures: http://www.[facility].org/weight-loss-surgery/nutrition/recipe-corner.html     https://www.Krillion.me/25-bariatric-friendly-weeknight-meals/    Crockpot recipes: https://www.Krillion.me/25-bariatric-friendly-crockpot-recipes/    https://[employer] Media.Agrivida/recipes/     https://www.[employer]ense [employer].Agrivida/mbd-recipes         Follow-Up: 6 months or prn    Time spent with patient: 21 minutes.  Mireya Lou RD LD  "

## 2021-10-19 ENCOUNTER — VIRTUAL VISIT (OUTPATIENT)
Dept: ENDOCRINOLOGY | Facility: CLINIC | Age: 56
End: 2021-10-19
Payer: COMMERCIAL

## 2021-10-19 DIAGNOSIS — Z98.84 S/P LAPAROSCOPIC SLEEVE GASTRECTOMY: ICD-10-CM

## 2021-10-19 DIAGNOSIS — Z71.3 NUTRITIONAL COUNSELING: Primary | ICD-10-CM

## 2021-10-19 DIAGNOSIS — E66.9 OBESITY: ICD-10-CM

## 2021-10-19 PROCEDURE — 97803 MED NUTRITION INDIV SUBSEQ: CPT | Mod: GT | Performed by: DIETITIAN, REGISTERED

## 2021-10-19 NOTE — PATIENT INSTRUCTIONS
"Rickey Carrillo,    Follow-up with RD in 6 months.     Thank you,    Mireya Lou, RD, LD  If you would like to schedule or reschedule an appointment with the RD, please call [phone]    Nutrition Goals  1) Consume 60 grams of protein/day.  2) Sip on 48-64 oz of fluids/day- between meals only.  3) Eat slowly (>20 min/meal), chewing foods well (to applesauce-like consistency).  4) Continue current vitamin/mineral supplements  5) Increase physical activity as safely able    Keeping Up Your Diet after Weight Loss Surgery  [email or web address]    Exercises after Weight Loss Surgery (strengthening, when no weight lifting restrictions)  [email or web address]    Tools for after surgery:  PopSeal Dish Sets: bariatric meal size bowls and plates with built in measurement designs on the dishes    Bariatric Pal: https://store.PromisePay.SIM Partners/collections/bariatric-dinnerware    Books:  \"Fresh Start Bariatric Cookbook\" by Lauren Kessler, MS, RDN, CD - $13 - Excellent cookbook with post-op recipes and many other resources to check out for ongoing support after surgery    \"Eating Well After Weight Loss Surgery\" by Ericka Devine and Jesus Alberto Colby - $10 - Great cookbook with recipes for each diet stage after surgery and recommended portion sizes. Slightly more intensive cooking recipes.    \"Bariatric Mindset Success\" by Irene Mitchell - $14.24 on Amazon - book that helps with prevention of weight regain after surgery. Helps train your brain for long term success with weight loss after surgery.    Post-Bariatric Surgery Online Recipe Resources:  Online:    Simpler Recipes: https://www.Ingenicard America/bariatric-surgery/recipes    Wonton cups and muffin tin portion sized recipes: https://www.hyaqu.SIM Partners/blog/10-single-serving-meals-you-need-in-your-bariatric-life/    Some recipes on this site have larger than 1 cup portion size pictures: http://www.[facility].org/weight-loss-surgery/nutrition/recipe-corner.html "     https://www.inkSIG Digital.me/25-bariatric-friendly-weeknight-meals/    Crockpot recipes: https://www.inkSIG Digital.me/25-bariatric-friendly-crockpot-recipes/    https://Nanjing Guanya Power Equipment/recipes/     https://www.NetConstat/mbd-recipes    Interested in working with a health ?  Health coaches work with you to improve your overall health and wellbeing.  They look at the whole person, and may involve discussion of different areas of life, including, but not limited to the four pillars of health (sleep, exercise, nutrition, and stress management). Discuss with your care team if you would like to start working a health .    Health Coaching-3 Pack:    $99 for three health coaching visits    Visits may be done in person or via phone    Coaching is a partnership between the  and the client; Coaches do not prescribe or diagnose    Coaching helps inspire the client to reach his/her personal goals      COMPREHENSIVE WEIGHT MANAGEMENT PROGRAM  VIRTUAL SUPPORT GROUPS    For Support Group Information:      We offer support groups for patients who are working on weight loss and considering, preparing for or have had weight loss surgery.   There is no cost for this opportunity.  You are invited to attend the?Virtual Support Groups?provided by any of the following locations:    1. [facility] via ItsOn Teams with Dianne Dias RN  2.   [place] via Beleza na Web with Michael Carias, PhD, LP  3.   [place] via Beleza na Web with Martha Capone RN  4.   [facility] via Microsoft Teams with Martha Coates [facility]    The following Support Group information can also be found on our website:  https://www.[facility].org/treatments/weight-loss-surgery-support-groups      [facility] Weight Loss Surgery Support Group    [facility] Weight Loss Surgery Support Group  The support group is a patient-lead forum that meets monthly to share experiences, encouragement and  "education. It is open to those who have had weight loss surgery, are scheduled for surgery, and those who are considering surgery.   WHEN: This group meets on the 3rd Wednesday of each month from 5:00PM - 6:00PM virtually using Microsoft Teams.   FACILITATOR: Led by Dianne Dias, the program's Clinical Coordinator.   TO REGISTER: Please contact the clinic via Infineta Systems or call the nurse line directly at [phone] to inform our staff that you would like an invite sent to you and to let us know the email you would like the invite sent to. Prior to the meeting, a link with directions on how to join the meeting will be sent to you.    2021 Meetings  August 18: \"Let's Talk\" a time for the group to share.  September 15: \"Let's Talk\" a time for the group to share.  October 20: \"Let's Talk\" a time for the group to share.  November 17: Lelia Pruitt RD, LD \"Protein, Metabolism and Meal Planning\"  December 15: Kenji Arzola RD, LD will speak, \"Recipe Modification\"    [facility] Support Groups    Connections: Bariatric Care Support Group?  This is open to all [facility] (and those external to this program) pre- and post- operative bariatric surgery patients as well as their support system.   WHEN: This group meets the 2nd Tuesday of each month from 6:30 PM - 8:00 PM virtually using Microsoft Teams.   FACILITATOR: Led by Michael Carias, Ph.D who is a Licensed Psychologist with the [facility] Comprehensive Weight Management Program.   TO REGISTER: Please send an email to Michael Carias, Ph.D., LP at?[email or web address]?if you would like an invitation to the group and to learn about using Microsoft Teams.    2021 Meetings  August 10: Open Forum  September 14: Guest Speaker: Martha Capone RN,CBN, CIC, [facility] Comprehensive Weight Management Program, \"Your Hospital Stay and Post-Operative Compliance\"  October 12: Open Forum  November 9: Guest Speaker: Cleo" "LUCIANO House,TANIA, [facility] Comprehensive Weight Management Program,\"Holiday Eating\".  December 14: Guest Speaker Ronel Ford MD, MPH, [facility], [facility], \"Body Contouring Surgery for Bariatric Surgery Patients\"     Connections: Post-Operative Bariatric Surgery Support Group  This is a support group for [facility] bariatric patients (and those external to [facility]) who have had bariatric surgery and are at least 3 months post-surgery.  WHEN: This support group meets the 4th Wednesday of the month from 11:00 AM - 12:00 PM virtually using Microsoft Teams.   FACILITATOR: Led by Certified Bariatric Nurse, Martha Capone RN.   TO REGISTER: Please send an email to Martha at [email or web address] if you would like an invitation to the group and to learn about using [employer]lin [employer].      [facility] Healthy Lifestyle Virtual Support Group    Healthy Lifestyle Virtual Support Group?  This is 60 minutes of small group guided discussion, support and resources. All are welcome who want a healthy lifestyle.  WHEN: This group meets monthly on a Friday from 12:30 PM - to 1:30 PM virtually using Microsoft Teams.   FACILITATOR: Led by National Board Certified Health , Martha Coates, [facility].   TO REGISTER: Please send an email to Martha [email or web address] to receive monthly invites to the group or if you have any questions about having a health .  Prior to the meeting, a link with directions on how to join the meeting will be sent to you.    2021 Meetings  August 27: Open Forum  September 24: Sleep and the 7 Types of Rest  October 29: Open Forum  November 19: Gratitude     December 10: Open Forum                            "

## 2021-10-19 NOTE — LETTER
"10/19/2021       RE: Stephanie Sanches  [address] [id number]     Dear Colleague,    Thank you for referring your patient, Stephanie Sanches, to the [facility] [place] at [facility]. Please see a copy of my visit note below.    Stephanie Sanches is a 55 year old female who is being evaluated via a billable video visit.      The patient has been notified of following:     \"This video visit will be conducted via a call between you and your physician/provider. We have found that certain health care needs can be provided without the need for an in-person physical exam.  This service lets us provide the care you need with a video conversation.  If a prescription is necessary we can send it directly to your pharmacy.  If lab work is needed we can place an order for that and you can then stop by our lab to have the test done at a later time.    Video visits are billed at different rates depending on your insurance coverage.  Please reach out to your insurance provider with any questions.    If during the course of the call the physician/provider feels a video visit is not appropriate, you will not be charged for this service.\"    Patient has given verbal consent for Video visit? Yes  How would you like to obtain your AVS? MyChart  If you are dropped from the video visit, the video invite should be resent to: Text to cell phone: [phone]  Will anyone else be joining your video visit? No   {If patient encounters technical issues they should call [phone]      Video-Visit Details    Type of service:  Video Visit    Video Start Time: 3:20 PM  Video End Time: 3:41 PM    Originating Location (pt. Location): Home    Distant Location (provider location):  [facility] [place]     Platform used for Video Visit: Core Audio Technology    During this virtual visit the patient is located in [place], patient " "verifies this as the location during the entirety of this visit.     Nutrition Reassessment  Reason For Visit:  Stephanie Sanches is a 55 year old female presenting today for nutrition follow-up, 6 month s/p Sleeve Gastrectomy with Dr Zambrano (4/5/2021).  Patient referred by Farida Seymour.    Patient with Co-morbidities of obesity including:  Type II DM yes (HgbA1c: 5.8 at last check on 5/11/21)  Renal Failure no  Sleep apnea no  Hypertension no   Dyslipidemia no  Joint pain no  Back pain no  GERD no      Pt's history is also significant for NAM, followed by GI     Anthropometrics:  Initial Consult Weight: 336 lbs  Day of Surgery Weight (4/5/21): 308.7 lbs    Current Weight:   Estimated body mass index is 35.49 kg/m  as calculated from the following:    Height as of 10/13/21: 1.715 m (5' 7.5\").    Weight as of 10/13/21: 104.3 kg (230 lb). (-106 lbs from initial, - 78 lbs from day of surgery    Current Vitamins/Minerals:   MVI with Iron 18 mg BID (Equate children's chewable)  ashu melt calcium 500 BID   ashu melts B12 1000mcg once a day  D3 5000 international unit(s) once day  B1 Ashu melt 12.5 mg once day  ashu probiotic 1 day    Taking omeprazole in am (separate time from MVI with iron).  Taking stool softner.     Nutrition History:  Tolerating regular bariatric diet well. Focuses on high protein meals - High-pro yogurt, chicken. Eating 1/4-1/2 cup sized meals. Daily protein shake. Did not tolerate broccoli. Reports taste changes, foods not tasting as good as before surgery. Experimenting with new foods and recipes.     Progress with Previous Goals:  1) Follow bariatric regular diet. - Met, continues  2) Consume 60 grams of protein/day. - Met, continues  3) Sip on 48-64 oz of fluids/day- between meals only. - Met, continues - Met, continues   4) Eat slowly (>20 min/meal), chewing foods well (to applesauce-like consistency). - Met, continues   5) Limit portions to 1/2 cup/meal. - Met, continues  6) Continue the " following after a Sleeve Gastrectomy:    Multivitamin/minerals: adult dose 2 times daily    Iron: 45-60 mg elemental (18-36 mg if low risk) - may partly or fully be covered in multivitamin     Calcium Citrate containing vitamin D: 500 mg 3 times daily or 600 mg 2 times daily    Vitamin B12: sublingual form of at least 500 mcg daily or injection of 1000 mcg monthly     B-50 Complex once daily     Physical Activity: Back pain lately, seeing a chiropracter and was given some activties.     Nutrition Prescription:  Grams Protein: 60 (minimum)   Amount of Fluid: 48-64 oz    Nutrition Diagnosis  Previous: Food and nutrition-related knowledge deficit r/t lack of prior exposure to diet instruction beyond 3 months s/p SG as evidenced by Pt seeking further guidance from RD on diet instruction beyond 3 months s/p SG. - resolved    Current: Food and nutrition-related knowledge deficit r/t lack of prior exposure to diet instruction beyond 6 months s/p SG as evidenced by Pt seeking further guidance from RD on diet instruction beyond 6 months s/p SG.     Intervention  Materials/Education provided, reviewed bariatric regular consistency diet, protein intake, fluid intake, eating pace, chewing foods well, portion control, sugar/fat intake, recommended vitamin/mineral supplements. Discussed ways to flavor foods without added calories, and post-op recipes resources. Praised pt on the positive diet and lifestyle change she has maintained since surgery.     Expected Engagement: good    Goals:  1) Consume 60 grams of protein/day.  2) Sip on 48-64 oz of fluids/day- between meals only.  3) Eat slowly (>20 min/meal), chewing foods well (to applesauce-like consistency).  4) Continue current vitamin/mineral supplements  5) Increase physical activity as safely able    Keeping Up Your Diet after Weight Loss Surgery  [email or web address]    Exercises after Weight Loss Surgery (strengthening, when no weight lifting  "restrictions)  [email or web address]    Tools for after surgery:  MedTel24 Dish Sets: bariatric meal size bowls and plates with built in measurement designs on the dishes    Bariatric Pal: https://store.DeRevpal.LivelyFeed/collections/bariatric-dinnerware    Books:  \"Fresh Start Bariatric Cookbook\" by Lauren Kessler, MS, RDN, CD - $13 - Excellent cookbook with post-op recipes and many other resources to check out for ongoing support after surgery    \"Eating Well After Weight Loss Surgery\" by Ericka Devine and Jesus Alberto Colby - $10 - Great cookbook with recipes for each diet stage after surgery and recommended portion sizes. Slightly more intensive cooking recipes.    \"Bariatric Mindset Success\" by Irene Mitchell - $14.24 on Amazon - book that helps with prevention of weight regain after surgery. Helps train your brain for long term success with weight loss after surgery.    Post-Bariatric Surgery Online Recipe Resources:  Online:    Simpler Recipes: https://www.Wordster/bariatric-surgery/recipes    Wonton cups and muffin tin portion sized recipes: https://www.Yippee Arts/blog/10-single-serving-meals-you-need-in-your-bariatric-life/    Some recipes on this site have larger than 1 cup portion size pictures: http://www.[facility].org/weight-loss-surgery/nutrition/recipe-corner.html     https://www.Shenzhen IdreamSky Technology.me/25-bariatric-friendly-weeknight-meals/    Crockpot recipes: https://www.Shenzhen IdreamSky Technology.me/25-bariatric-friendly-crockpot-recipes/    https://GooodJob.LivelyFeed/recipes/     https://www.RightCare Solutions.LivelyFeed/mbd-recipes         Follow-Up: 6 months or prn    Time spent with patient: 21 minutes.  Mireya Lou RD LD    "

## 2021-12-05 ENCOUNTER — HEALTH MAINTENANCE LETTER (OUTPATIENT)
Age: 56
End: 2021-12-05

## 2022-01-24 ENCOUNTER — TELEPHONE (OUTPATIENT)
Dept: ENDOCRINOLOGY | Facility: CLINIC | Age: 57
End: 2022-01-24
Payer: COMMERCIAL

## 2022-01-24 NOTE — TELEPHONE ENCOUNTER
Received some out side notes from [facility] service 1/11/2022 report from Gastroenterology     Sent to scan into pt chart and for Dr Zambrano to review

## 2022-01-26 DIAGNOSIS — Z98.84 S/P LAPAROSCOPIC SLEEVE GASTRECTOMY: Primary | ICD-10-CM

## 2022-01-30 ENCOUNTER — HEALTH MAINTENANCE LETTER (OUTPATIENT)
Age: 57
End: 2022-01-30

## 2022-03-27 ENCOUNTER — HEALTH MAINTENANCE LETTER (OUTPATIENT)
Age: 57
End: 2022-03-27

## 2022-04-11 NOTE — PROGRESS NOTES
"Stephanie Sanches is a 55 year old female who is being evaluated via a billable video visit.      The patient has been notified of following:     \"This video visit will be conducted via a call between you and your physician/provider. We have found that certain health care needs can be provided without the need for an in-person physical exam.  This service lets us provide the care you need with a video conversation.  If a prescription is necessary we can send it directly to your pharmacy.  If lab work is needed we can place an order for that and you can then stop by our lab to have the test done at a later time.    Video visits are billed at different rates depending on your insurance coverage.  Please reach out to your insurance provider with any questions.    If during the course of the call the physician/provider feels a video visit is not appropriate, you will not be charged for this service.\"    Patient has given verbal consent for Video visit? Yes  How would you like to obtain your AVS? MyChart  If you are dropped from the video visit, the video invite should be resent to: Text to cell phone: [phone]  Will anyone else be joining your video visit? No   {If patient encounters technical issues they should call [phone]      Video-Visit Details    Type of service:  Video Visit    Video Start Time: 3:00 PM  Video End Time: 3:24 PM    Originating Location (pt. Location): Home    Distant Location (provider location):  [facility] [place]     Platform used for Video Visit: TrueFacet    During this virtual visit the patient is located in [place], patient verifies this as the location during the entirety of this visit.     Nutrition Reassessment  Reason For Visit:  Stephanie Sanches is a 55 year old female presenting today for nutrition follow-up, 12 month s/p Sleeve Gastrectomy with Dr Zambrano (4/5/2021).  Patient referred by Farida Seymour.    Patient with Co-morbidities of obesity " including:  Type II DM yes (HgbA1c: 5.8 at last check on 5/11/21)  Renal Failure no  Sleep apnea no  Hypertension no   Dyslipidemia no  Joint pain no  Back pain no  GERD no      Pt's history is also significant for NAM, followed by GI     Anthropometrics:  Initial Consult Weight: 336 lbs  Day of Surgery Weight (4/5/21): 308.7 lbs  Current Weight: 206 lbs with BMI 31 (-136 lbs from initial, - 102 lbs from day of surgery)    Current Vitamins/Minerals:   MVI with Iron 18 mg BID (Equate children's chewable)  ashu melt calcium 500 BID   ahsu melts B12 1000 mcg once a day  Gummy D3 2000 international unit(s) once day  Ashu melt B1 12.5 mg once day  ashu probiotic 1 day  Did zinc repletion for 90 days, completed.     Taking omeprazole in am (separate time from MVI with iron).      Nutrition History:  Tolerating regular bariatric diet well. Tastes changed after surgery, doesn't care for some of the dishes she used to really enjoy. Eating 1/4-1/2 cup sized meals. Continues to use small plates, small utensils, chewing thoroughly. Continues daily protein shake. Fluid intake 48-64 oz/day. Now working on getting in more fruits and vegetables at meals.   Constipation requiring daily Miralax  Severe gas/bloating, has lactulose breath test to assess for SIBO on May 12th.      Diet Recall:  Breakfast: yogurt, high protein oatmeal; chaffles; high protein meals  Lunch: soups;   Snack: 1/4 cup of yogurt; sugar-free jello/pudding  Dinner: chicken + fruit/vegetable; burger + egg + cheese; shrimp   Beverages: Protein shake, water.     Progress with Previous Goals:  1) Consume 60 grams of protein/day. - Met, continues  2) Sip on 48-64 oz of fluids/day- between meals only. - Met, continues  3) Eat slowly (>20 min/meal), chewing foods well (to applesauce-like consistency). - Met, continues  4) Continue current vitamin/mineral supplements- Met, continues  5) Increase physical activity as safely able - Improving, does a sit cycle for 30 mins,  "and strength  at home. Wants to get outside. She does note some numbness in her foot following up with ortho tomorrow regarding.    Physical Activity: \"Sit-cycle\" for 30 mins, leg strengthening equiptment at home, Eager for warm weather to walk more.      Nutrition Prescription:  Grams Protein: 60 (minimum)   Amount of Fluid: 48-64 oz    Nutrition Diagnosis  Previous: Food and nutrition-related knowledge deficit r/t lack of prior exposure to diet instruction beyond 6 months s/p SG as evidenced by Pt seeking further guidance from RD on diet instruction beyond 6 months s/p SG.  - resolved    Current: Food and nutrition-related knowledge deficit r/t lack of prior exposure to diet instruction beyond 12 months s/p SG as evidenced by Pt seeking further guidance from RD on diet instruction beyond 12 months s/p SG.     Intervention  Materials/Education provided, reviewed bariatric regular consistency diet, protein intake, fluid intake, eating pace, chewing foods well, portion control, sugar/fat intake, recommended vitamin/mineral supplements. Praised pt on the positive diet and lifestyle change she has maintained since surgery.     Expected Engagement: good    Goals:  1) Consume 60 grams of protein/day.  2) Sip on 48-64 oz of fluids/day- between meals only.  3) Eat slowly (>20 min/meal), chewing foods well (to applesauce-like consistency).  4) Continue current vitamin/mineral supplements  5) Increase physical activity as safely able    Keeping Up Your Diet after Weight Loss Surgery  [email or web address]    Exercises after Weight Loss Surgery (strengthening, when no weight lifting restrictions)  [email or web address]    Tools for after surgery:  Centrafuse Dish Sets: bariatric meal size bowls and plates with built in measurement designs on the dishes    Bariatric Pal: https://store.bariatricpal.com/collections/bariatric-dinnerware    Books:  \"Fresh Start Bariatric Cookbook\" by Lauren Kessler MS, RDN, " "CD - $13 - Excellent cookbook with post-op recipes and many other resources to check out for ongoing support after surgery    \"Eating Well After Weight Loss Surgery\" by Ericka Devine and Jesus Alberto Colby - $10 - Great cookbook with recipes for each diet stage after surgery and recommended portion sizes. Slightly more intensive cooking recipes.    \"Bariatric Mindset Success\" by Irene Mitchell - $14.24 on Amazon - book that helps with prevention of weight regain after surgery. Helps train your brain for long term success with weight loss after surgery.    Post-Bariatric Surgery Online Recipe Resources:  Online:    Simpler Recipes: https://www.Mobi-Moto/bariatric-surgery/recipes    Wonton cups and muffin tin portion sized recipes: https://www.Signix/blog/10-single-serving-meals-you-need-in-your-bariatric-life/    Some recipes on this site have larger than 1 cup portion size pictures: http://www.[facility].org/weight-loss-surgery/nutrition/recipe-corner.html     https://www.vzaar.me/25-bariatric-friendly-weeknight-meals/    Crockpot recipes: https://www.vzaar.me/25-bariatric-friendly-crockpot-recipes/    https://Emprego Ligado.Akatsuki/recipes/     https://www.Customer.io.Akatsuki/mbd-recipes         Follow-Up: 12 months or prn    Time spent with patient: 24 minutes.  Mireya Lou RD LD  "

## 2022-04-12 ENCOUNTER — VIRTUAL VISIT (OUTPATIENT)
Dept: ENDOCRINOLOGY | Facility: CLINIC | Age: 57
End: 2022-04-12
Payer: COMMERCIAL

## 2022-04-12 ENCOUNTER — TELEPHONE (OUTPATIENT)
Dept: ENDOCRINOLOGY | Facility: CLINIC | Age: 57
End: 2022-04-12
Payer: COMMERCIAL

## 2022-04-12 DIAGNOSIS — Z98.84 S/P LAPAROSCOPIC SLEEVE GASTRECTOMY: ICD-10-CM

## 2022-04-12 DIAGNOSIS — Z71.3 NUTRITIONAL COUNSELING: Primary | ICD-10-CM

## 2022-04-12 DIAGNOSIS — E66.9 OBESITY: ICD-10-CM

## 2022-04-12 PROCEDURE — 97803 MED NUTRITION INDIV SUBSEQ: CPT | Mod: GT | Performed by: DIETITIAN, REGISTERED

## 2022-04-12 NOTE — LETTER
"4/12/2022       RE: Stephanie Sanches  [address] [address]     Dear Colleague,    Thank you for referring your patient, Stephanie Sanches, to the [facility] [place] [facility]. Please see a copy of my visit note below.    Stephanie Sanches is a 55 year old female who is being evaluated via a billable video visit.      The patient has been notified of following:     \"This video visit will be conducted via a call between you and your physician/provider. We have found that certain health care needs can be provided without the need for an in-person physical exam.  This service lets us provide the care you need with a video conversation.  If a prescription is necessary we can send it directly to your pharmacy.  If lab work is needed we can place an order for that and you can then stop by our lab to have the test done at a later time.    Video visits are billed at different rates depending on your insurance coverage.  Please reach out to your insurance provider with any questions.    If during the course of the call the physician/provider feels a video visit is not appropriate, you will not be charged for this service.\"    Patient has given verbal consent for Video visit? Yes  How would you like to obtain your AVS? MyChart  If you are dropped from the video visit, the video invite should be resent to: Text to cell phone: [phone]  Will anyone else be joining your video visit? No   {If patient encounters technical issues they should call [phone]      Video-Visit Details    Type of service:  Video Visit    Video Start Time: 3:00 PM  Video End Time: 3:24 PM    Originating Location (pt. Location): Home    Distant Location (provider location):  [facility] [place]     Platform used for Video Visit: [employer]    During this virtual visit the patient is located in [place], patient verifies " this as the location during the entirety of this visit.     Nutrition Reassessment  Reason For Visit:  Stephanie Sanches is a 55 year old female presenting today for nutrition follow-up, 12 month s/p Sleeve Gastrectomy with Dr Zambrano (4/5/2021).  Patient referred by Farida Seymour.    Patient with Co-morbidities of obesity including:  Type II DM yes (HgbA1c: 5.8 at last check on 5/11/21)  Renal Failure no  Sleep apnea no  Hypertension no   Dyslipidemia no  Joint pain no  Back pain no  GERD no      Pt's history is also significant for NAM, followed by GI     Anthropometrics:  Initial Consult Weight: 336 lbs  Day of Surgery Weight (4/5/21): 308.7 lbs  Current Weight: 206 lbs with BMI 31 (-136 lbs from initial, - 102 lbs from day of surgery)    Current Vitamins/Minerals:   MVI with Iron 18 mg BID (Equate children's chewable)  ashu melt calcium 500 BID   ashu melts B12 1000 mcg once a day  Gummy D3 2000 international unit(s) once day  Ashu melt B1 12.5 mg once day  ashu probiotic 1 day  Did zinc repletion for 90 days, completed.     Taking omeprazole in am (separate time from MVI with iron).      Nutrition History:  Tolerating regular bariatric diet well. Tastes changed after surgery, doesn't care for some of the dishes she used to really enjoy. Eating 1/4-1/2 cup sized meals. Continues to use small plates, small utensils, chewing thoroughly. Continues daily protein shake. Fluid intake 48-64 oz/day. Now working on getting in more fruits and vegetables at meals.   Constipation requiring daily Miralax  Severe gas/bloating, has lactulose breath test to assess for SIBO on May 12th.      Diet Recall:  Breakfast: yogurt, high protein oatmeal; chaffles; high protein meals  Lunch: soups;   Snack: 1/4 cup of yogurt; sugar-free jello/pudding  Dinner: chicken + fruit/vegetable; burger + egg + cheese; shrimp   Beverages: Protein shake, water.     Progress with Previous Goals:  1) Consume 60 grams of protein/day. - Met,  "continues  2) Sip on 48-64 oz of fluids/day- between meals only. - Met, continues  3) Eat slowly (>20 min/meal), chewing foods well (to applesauce-like consistency). - Met, continues  4) Continue current vitamin/mineral supplements- Met, continues  5) Increase physical activity as safely able - Improving, does a sit cycle for 30 mins, and strength  at home. Wants to get outside. She does note some numbness in her foot following up with ortho tomorrow regarding.    Physical Activity: \"Sit-cycle\" for 30 mins, leg strengthening equiptment at home, Eager for warm weather to walk more.      Nutrition Prescription:  Grams Protein: 60 (minimum)   Amount of Fluid: 48-64 oz    Nutrition Diagnosis  Previous: Food and nutrition-related knowledge deficit r/t lack of prior exposure to diet instruction beyond 6 months s/p SG as evidenced by Pt seeking further guidance from RD on diet instruction beyond 6 months s/p SG.  - resolved    Current: Food and nutrition-related knowledge deficit r/t lack of prior exposure to diet instruction beyond 12 months s/p SG as evidenced by Pt seeking further guidance from RD on diet instruction beyond 12 months s/p SG.     Intervention  Materials/Education provided, reviewed bariatric regular consistency diet, protein intake, fluid intake, eating pace, chewing foods well, portion control, sugar/fat intake, recommended vitamin/mineral supplements. Praised pt on the positive diet and lifestyle change she has maintained since surgery.     Expected Engagement: good    Goals:  1) Consume 60 grams of protein/day.  2) Sip on 48-64 oz of fluids/day- between meals only.  3) Eat slowly (>20 min/meal), chewing foods well (to applesauce-like consistency).  4) Continue current vitamin/mineral supplements  5) Increase physical activity as safely able    Keeping Up Your Diet after Weight Loss Surgery  [email or web address]    Exercises after Weight Loss Surgery (strengthening, when no weight " "lifting restrictions)  [email or web address]...[email or web address]    Tools for after surgery:  Beam Express Dish Sets: bariatric meal size bowls and plates with built in measurement designs on the dishes    Bariatric Pal: https://store.bariatricpal.com/collections/bariatric-dinnerware    Books:  \"Fresh Start Bariatric Cookbook\" by Lauren Kessler, MS, RDN, CD - $13 - Excellent cookbook with post-op recipes and many other resources to check out for ongoing support after surgery    \"Eating Well After Weight Loss Surgery\" by Ericka Devine and Jesus Alberto Colby - $10 - Great cookbook with recipes for each diet stage after surgery and recommended portion sizes. Slightly more intensive cooking recipes.    \"Bariatric Mindset Success\" by Irene Mitchell - $14.24 on Amazon - book that helps with prevention of weight regain after surgery. Helps train your brain for long term success with weight loss after surgery.    Post-Bariatric Surgery Online Recipe Resources:  Online:    Simpler Recipes: https://www.HighRoads/bariatric-surgery/recipes    Wonton cups and muffin tin portion sized recipes: https://www.Primordial/blog/10-single-serving-meals-you-need-in-your-bariatric-life/    Some recipes on this site have larger than 1 cup portion size pictures: http://www.[facility].org/weight-loss-surgery/nutrition/recipe-corner.html     https://www.[employer].me/25-bariatric-friendly-weeknight-meals/    Crockpot recipes: https://www.[employer].me/25-bariatric-friendly-crockpot-recipes/    https://Verified Person.[employer]/recipes/     https://www.8Trip.[employer] Diagn[employer]/mbd-recipes         Follow-Up: 12 months or prn    Time spent with patient: 24 minutes.  Mireya Lou RD LD    " no

## 2022-04-12 NOTE — TELEPHONE ENCOUNTER
Received out side records from [facility]  Provider Venancio Mann PA-C Gastroenterology    Scan into patient chart

## 2022-04-12 NOTE — PROGRESS NOTES
Return Bariatric Surgery Note    RE: Stephanie Sanches  MR#: [id number]  : 1965  VISIT DATE: 2022      Dear Benson Allen,    I had the pleasure of seeing your patient, Stephanie Sanches, in my post-bariatric surgery assessment clinic.    Assessment & Plan   Problem List Items Addressed This Visit        Other    S/P laparoscopic sleeve gastrectomy     1 year s/p sleeve gastrectomy. Feels great. Improved energy. Mood good. Sustained total body weight loss of 36%. Adequate protein. Hydration improving. Still only tolerating about 1/4 cup at a meal. Persistent distaste for foods previously enjoyed but manageable thus far.     No reflux, off PPI. Constipation well controlled with miralax, tapered down to once daily. Increased flatus x 2 months, followed by GI Dr. Mann. Plans in place for hydrogen breath test.     Dr. Zambrano's note from postop recommended follow up with him 1 year postop and a repeat liver biopsy. Dr. Mann will order biopsy. Patient to schedule with Dr. Zambrano.     Plan:  Follow up with Dr. Zambrano  PCP will manage labs per patient   Follow up with myself and dietitian in 1 year                       28 minutes spent on the date of the encounter doing chart review, history and exam, documentation and further activities per the note    CHIEF COMPLAINT: Post-bariatric surgery follow-up    HISTORY OF PRESENT ILLNESS:  Questions Regarding Prior Weight Loss Surgery Reviewed With Patient 2022   I had the following weight loss procedure: Sleeve Gastrectomy   What year was your surgery?    How has your weight changed since your last visit? I have lost weight   Are you currently taking any weight loss medications? No   Do you currently have any of the following: None of the above   Have you been to the Emergency room since your last visit with us? No   Were you in the hospital since your last visit with us? No   Do you have any concerns today? Skin pulling     F/u with Dr. WYLIE    Liver biopsy     Upper arms, breasts, lower abdomen. All skin is pulling on neck. Has C6-7 fusion which is irritated by the extra weight pulling. Trying more supportive bra and garments. Doing core strengthening.     Hunger/ cravings okay. Still only able to get about 1/4cup at a time. Adequate protein and hydration improving.     Still has some distaste for several foods. No dysphagia except for broccoli.     No reflux, off omeprazole.     Increased flatus x 2 months. Testing for SIBO in a couple of months     Improved energy.   Weight History:     4/9/2022   What is your highest lifetime weight? 385   What is your lowest weight since surgery? (In pounds) 204     Initial Weight (lbs): 326 lbs  Weight: 93.4 kg (206 lb)  Last Visits Weight: 104.3 kg (230 lb)  Cumulative weight loss (lbs): 120  Weight Loss Percentage: 36.81%    Questions Regarding Co-Morbidities and Health Concerns Reviewed With Patient 4/9/2022   Pre-diabetes: Gone away   Diabetes II: Gone away   What was your most recent hemoglobin a1c  -   How many years have you had diabetes? -   High Blood Pressure: Never   High cholesterol: Never   Heartburn/Reflux: Gone away   Are you taking daily medication for heartburn, acid reflux, or GERD (acid reflux disease)? -   Sleep apnea: Never   PCOS: Never   Back pain: Worsened   Joint pain: Worsened   Lower leg swelling: Improved       Eating Habits 4/9/2022   How many meals do you eat per day? 3   Do you snack between meals? Sometimes   How much food are you eating at each meal? Less than 1/2 cup   Are you able to separate your meals and liquids by at least 30 minutes? Yes   Are you able to avoid liquid calories? Yes       Exercise Questions Reviewed With Patient 4/9/2022   How often do you exercise? 3 to 4 times per week   What is the duration of your exercise (in minutes)? 30 Minutes   What types of exercise do you do? walking, weightlifting, other   What keeps you from being more active?  I am as active as I  "can possbily be, Pain       Social History:      4/9/2022   Are you smoking? No   Are you drinking alcohol? No       Medications:  Current Outpatient Medications   Medication     calcium citrate-vitamin D (CALCIUM CITRATE +) 315-200 MG-UNIT TABS per tablet     Cetaphil Moisturizing (CETAPHIL) external lotion     cholecalciferol 25 MCG (1000 UT) CHEW     multivitamin w/minerals (MULTI-VITAMIN) tablet     nystatin (MYCOSTATIN) 284047 UNIT/GM external powder     polyethylene glycol (MIRALAX) 17 GM/Dose powder     Probiotic Product (PROBIOTIC-10 PO)     Thiamine HCl (B-1 PO)     No current facility-administered medications for this visit.         4/9/2022   Do you avoid NSAIDs such as (Ibuprofen, Aleve, Naproxen, Advil)?   Yes       ROS:  GI:      4/9/2022   Vomiting: No   Diarrhea: No   Constipation: No   Swallowing trouble: No   Abdominal pain: No   Heartburn: No   Rash in skin folds: Yes   Depression: No   Stress urinary incontinence No     Skin:   BAR RBS ROS - SKIN 4/9/2022   Rash in skin folds: Yes     Psych:      4/9/2022   Depression: No   Anxiety: No     Female Only:   BAR RBS ROS - FEMALE ONLY 4/9/2022   Female only: None of the above       Transferred Records on 05/11/2021   Component Date Value Ref Range Status     TSH 05/11/2021 3.02  0.40 - 3.99 uIU/mL Final     Cholesterol 05/11/2021 164  114 - 200 mg/dL Final     Triglycerides 05/11/2021 133  10 - 200 mg/dL Final     HDL Cholesterol 05/11/2021 50  40 - 60 mg/dL Final     LDL Cholesterol Calculated 05/11/2021 87  <100 mg/dL Final     Potassium 05/11/2021 3.9  3.4 - 5.1 mEq/L Final     Creatinine 05/11/2021 0.78  0.40 - 1.00 mg/dL Final     GFR Estimate 05/11/2021 >60  >60 ml/min/1.73m2 Final     Glucose 05/11/2021 126 (A) 70 - 99 mg/dL Final     AST 05/11/2021 22  10 - 40 IU/L Final     ALT 05/11/2021 19  6 - 31 IU/L Final     Hemoglobin A1C POCT 05/11/2021 5.8 (A) 4.0 - 5.6 % Final         PHYSICAL EXAM:  Objective    Ht 1.715 m (5' 7.5\")   Wt 93.4 kg " (206 lb)   BMI 31.79 kg/m           Vitals:  No vitals were obtained today due to virtual visit.    Physical Exam   GENERAL: Healthy, alert and no distress  EYES: Eyes grossly normal to inspection.  No discharge or erythema, or obvious scleral/conjunctival abnormalities.  RESP: No audible wheeze, cough, or visible cyanosis.  No visible retractions or increased work of breathing.    SKIN: Visible skin clear. No significant rash, abnormal pigmentation or lesions.  NEURO: Cranial nerves grossly intact.  Mentation and speech appropriate for age.  PSYCH: Mentation appears normal, affect normal/bright, judgement and insight intact, normal speech and appearance well-groomed.        Sincerely,    Farida Seymour NP      During this virtual visit the patient is located in [place], patient verifies this as the location during the entirety of this visit.     Lorena is a 56 year old who is being evaluated via a billable video visit.      How would you like to obtain your AVS? MyChart  If the video visit is dropped, the invitation should be resent by: Text to cell phone:  [phone]  Will anyone else be joining your video visit? No      Video Start Time: 3:04 PM  Video-Visit Details    Type of service:  Video Visit    Video End Time:3:25 PM    Originating Location (pt. Location): Home    Distant Location (provider location):  [facility] [place]     Platform used for Video Visit: Danelle Alfaro NREMT

## 2022-04-12 NOTE — PATIENT INSTRUCTIONS
"Hi Lorena,    Follow-up with RD in 12 months, or as needed.     Thank you,    Mireya Lou, RD, LD  If you would like to schedule or reschedule an appointment with the RD, please call [phone]    Nutrition Goals  1) Consume 60 grams of protein/day.  2) Sip on 48-64 oz of fluids/day- between meals only.  3) Eat slowly (>20 min/meal), chewing foods well (to applesauce-like consistency).  4) Continue current vitamin/mineral supplements  5) Increase physical activity as safely able    Keeping Up Your Diet after Weight Loss Surgery  [email or web address]    Exercises after Weight Loss Surgery (strengthening, when no weight lifting restrictions)  [email or web address].[email or web address]    Tools for after surgery:  NationBuilder Dish Sets: bariatric meal size bowls and plates with built in measurement designs on the dishes    Bariatric Pal: https://store.bariatricpal.com/collections/bariatric-dinnerware    Books:  \"Fresh Start Bariatric Cookbook\" by Lauren Kessler, MS, RDN, CD - $13 - Excellent cookbook with post-op recipes and many other resources to check out for ongoing support after surgery    \"Eating Well After Weight Loss Surgery\" by Ericka Devine and Jesus Alberto Colby - $10 - Great cookbook with recipes for each diet stage after surgery and recommended portion sizes. Slightly more intensive cooking recipes.    \"Bariatric Mindset Success\" by Irene Mitchell - $14.24 on Amazon - book that helps with prevention of weight regain after surgery. Helps train your brain for long term success with weight loss after surgery.    Post-Bariatric Surgery Online Recipe Resources:  Online:  Simpler Recipes: https://www.Aquafadas/bariatric-surgery/recipes  Wonton cups and muffin tin portion sized recipes: https://www.Platform9 Systems.[employer]/blog/10-single-serving-meals-you-need-in-your-bariatric-life/  Some recipes on this site have larger than 1 cup portion size pictures: " http://www.[facility].org/weight-loss-surgery/nutrition/recipe-corner.html   https://www.[employer].me/25-bariatric-friendly-weeknight-meals/  Crockpot recipes: https://www.[employer].me/25-bariatric-friendly-crockpot-recipes/  https://Zounds/recipes/   https://www.Coinbase/mbd-recipes      Interested in working with a health ?  Health coaches work with you to improve your overall health and wellbeing.  They look at the whole person, and may involve discussion of different areas of life, including, but not limited to the four pillars of health (sleep, exercise, nutrition, and stress management). Discuss with your care team if you would like to start working a health .    Health Coaching-3 Pack:    $99 for three health coaching visits    Visits may be done in person or via phone    Coaching is a partnership between the  and the client; Coaches do not prescribe or diagnose    Coaching helps inspire the client to reach his/her personal goals      COMPREHENSIVE WEIGHT MANAGEMENT PROGRAM  VIRTUAL SUPPORT GROUPS    For Support Group Information:      We offer support groups for patients who are working on weight loss and considering, preparing for or have had weight loss surgery.   There is no cost for this opportunity.  You are invited to attend the?Virtual Support Groups?provided by any of the following locations:    [facility] via MediaLink Teams with Dianne Dias RN  2.   [place] via MediaLink Teams with Michael Carias, PhD, LP  3.   [place] via MediaLink Teams with Martha Capone RN  4.   [facility] via MediaLink Teams with Martha Coates [facility]    The following Support Group information can also be found on our website:  https://www.[facility].org/treatments/weight-loss-surgery-support-groups      [facility] Weight Loss Surgery Support Group    [facility] Weight Loss Surgery Support Group  The support group is a patient-lead  "forum that meets monthly to share experiences, encouragement and education. It is open to those who have had weight loss surgery, are scheduled for surgery, and those who are considering surgery.   WHEN: This group meets on the 3rd Wednesday of each month from 5:00PM - 6:00PM virtually using Microsoft Teams.   FACILITATOR: Led by Dianne Dias RD, LD, RN, the program's Clinical Coordinator.   TO REGISTER: Please contact the clinic via MyCare or call the nurse line directly at [phone] to inform our staff that you would like an invite sent to you and to let us know the email you would like the invite sent to. Prior to the meeting, a link with directions on how to join the meeting will be sent to you.    2022 Meetings  January 19: \"Let's Talk\" a time for the group to share.  February 16: \"Let's Talk\" a time for the group to share.  March 16: Guest Speakers: Psychologists, Jacquelyn Alonzo, PhD,LP and Aliyah Lopez PsyD,  April 20: Guest Speaker: Health Julissa, [facility],CHES, [facility]  May 18: Guest Speaker: Dietitian, Kenji Arzola, LUCIANO, LP  Gianna 15: \"Let's Talk\" a time for the group to share.  July 20: \"Let's Talk\" a time for the group to share.  August 17: TBA  September 21: TBA  October 19: Guest Speaker: Dr Checo Ayers MD Pulmonologist and Sleep Medicine Physician, \"Getting a Good Night's Sleep\".  November 16: TBA  December 21: TBA    [facility] Support Groups    Connections: Bariatric Care Support Group?  This is open to all [facility] (and those external to this program) pre- and post- operative bariatric surgery patients as well as their support system.   WHEN: This group meets the 2nd Tuesday of each month from 6:30 PM - 8:00 PM virtually using Microsoft Teams.   FACILITATOR: Led by Michael Carias, Ph.D who is a Licensed Psychologist with the [facility] Comprehensive Weight Management Program.   TO REGISTER: Please send an email to Michael Carias, " "Ph.D., LP at?[email or web address]?if you would like an invitation to the group and to learn about using Microsoft Teams.    2022 Meetings January 11: Carie Hanks, PharmD, Pharmacy Resident at [facility], \"Medications and Bariatric Surgery\".  February 8: Open Forum  March 8  April 12  May 10  Gianna 14    Connections: Post-Operative Bariatric Surgery Support Group  This is a support group for [facility] bariatric patients (and those external to [facility]) who have had bariatric surgery and are at least 3 months post-surgery.  WHEN: This support group meets the 4th Wednesday of the month from 11:00 AM - 12:00 PM virtually using Microsoft Teams.   FACILITATOR: Led by Certified Bariatric Nurse, Martha Capone RN.   TO REGISTER: Please send an email to Martha at [email or web address] if you would like an invitation to the group and to learn about using Microsoft Teams.    2022 Meetings  January 26  February 23  March 23  April 27  May 25  Gianna 22    [facility] Healthy Lifestyle Virtual Support Group    Healthy Lifestyle Virtual Support Group?  This is 60 minutes of small group guided discussion, support and resources. All are welcome who want a healthy lifestyle.  WHEN: This group meets monthly on a Friday from 12:30 PM - 1:30 PM virtually using Microsoft Teams.   FACILITATOR: Led by National Board Certified Health and , Martha Coates [facility].   TO REGISTER: Please send an email to Martha at?[email or web address] to receive monthly invites to the group or if you have any questions about having a health .  Prior to the meeting, a link with directions on how to join the meeting will be sent to you.    2022 Meetings January 21: Rosalina Iqbal MS, RN, CIC, CBN, \"Healthy Habits\"  February 25: Open Forum  March 18: \"Setting Limits and Boundaries\"  April 29: Nisa Leon RD, \"Meal Planning Made Easy\"  May 20: Open Forum June: To be " determined

## 2022-04-13 ENCOUNTER — VIRTUAL VISIT (OUTPATIENT)
Dept: ENDOCRINOLOGY | Facility: CLINIC | Age: 57
End: 2022-04-13
Payer: COMMERCIAL

## 2022-04-13 VITALS — BODY MASS INDEX: 31.22 KG/M2 | WEIGHT: 206 LBS | HEIGHT: 68 IN

## 2022-04-13 DIAGNOSIS — Z98.84 S/P LAPAROSCOPIC SLEEVE GASTRECTOMY: ICD-10-CM

## 2022-04-13 PROCEDURE — 99213 OFFICE O/P EST LOW 20 MIN: CPT | Mod: GT | Performed by: NURSE PRACTITIONER

## 2022-04-13 NOTE — NURSING NOTE
Chief Complaint   Patient presents with     Follow Up     Return after bariatric surgery.       Vitals:    04/13/22 1458   Weight: 206 lb       Body mass index is 31.79 kg/m .      Jacobo Alfaro, EMT  Surgery Clinic

## 2022-04-13 NOTE — PATIENT INSTRUCTIONS
"Thank you for allowing us the privilege of caring for you. We hope we provided you with the excellent service you deserve.   Please let us know if there is anything else we can do for you so that we can be sure you are completely satisfied with your care experience.    To ensure the quality of our services you may be receiving a patient satisfaction survey from an independent patient satisfaction monitoring company.    The greatest compliment you can give is a \"Likely to Recommend\"    Your visit was with Farida Seymour NP today.    Instructions per today's visit:     Rickey Sanches, it was great to visit with you today.  Here is a review of our visit.  If our clinic scheduler is not able to reach you please call [phone] to schedule your next appointments.    -follow up with Dr. Zambrano  -do liver biopsy  -keep up the great work   -follow up in 1 year, sooner if needed       Information about Video Visits with [facility] [address] [facility] [employer]: video visit information  _________________________________________________________________________________________________________________________________________________________  If you are asked by your clinic team to have your blood pressure checked:  [facility] do offer several locations for blood pressure checks. Please follow the below link to schedule an appointment. Scheduling an appointment at the pharmacy for a blood pressure check is now preferred.    Appointment Plus (appointment-plus.[employer])  _________________________________________________________________________________________________________________________________________________________  Important contact and scheduling information:  Please call our contact center at [phone] to schedule your next appointments.  To find a lab location near you, please call [phone].  For any nursing questions or concerns call Ynes Osborne LPN at [phone] or Ruth Arora RN at [phone]  Please call during " clinic hours Monday through Friday 8:00a - 4:00p if you have questions or you can contact us via Affresol at anytime and we will reply during clinic hours.    Lab results will be communicated through My Chart or letter (if My Chart not used). Please call the clinic if you have not received communication after 1 week or if you have any questions.?  Clinic Fax: [phone]    _________________________________________________________________________________________________________________________________________________________  Meal Replacement Products:    Here is the link to our new e-store where you can purchase our meal replacement products    [employer] E-Store  [employer]/store    The one week starter kit is a great way to sample a variety of products and see what works for you.    If you want more information about the product go to: [employer] website.[employer]    If you are an employee or [facility] or  [employer] please contact your care team for a 10% estore discount    Free Shipping for orders over $75     Benefits of meal replacements products:    Portion and calorie control  Improved nutrition  Structured eating  Simplified food choices  Avoid contact with trigger foods  _________________________________________________________________________________________________________________________________________________________  Interested in working with a health ?  Health coaches work with you to improve your overall health and wellbeing.  They look at the whole person, and may involve discussion of different areas of life, including, but not limited to the four pillars of health (sleep, exercise, nutrition, and stress management). Discuss with your care team if you would like to start working a health .  Health Coaching-3 Pack: Schedule by calling [phone]    $99 for three health coaching visits    Visits may be done in person or via phone     Coaching is a partnership between the  and the client; Coaches do not prescribe or diagnose    Coaching helps inspire the client to reach his/her personal goals   _________________________________________________________________________________________________________________________________________________________  24 Week Healthy Lifestyle Plan:    Our mission in the 24-week Healthy Lifestyle Plan is to provide you with individualized care by giving you the tools, education and support you need to lose weight and maintain a healthy lifestyle. In your 24-week journey, you ll be supported by a dedicated weight loss team that includes registered dietitians, medical weight management providers, health coaches, and nurses -- all with special expertise in weight loss -- to help you every step of the way.     Monthly meetings with your registered dietician or medical weight management provider help to review your progress, update your care plan, and make any adjustments needed to ensure success. Between these visits, weekly and bi-weekly health  visits will help you focus on the four pillars of weight loss -- stress, sleep, nutrition, and exercise -- and how you can best adapt each to achieve sustainable weight loss results.    In addition, you will be given exclusive access to online wellbeing classes through [employer].  Your initial visit will be with a medical weight management provider who will help to understand your weight loss goals and ensure this program is the right fit for you. Please let our team know if you are interested in the 24 week plan by sending a message to your care team or calling [phone] to schedule.  _________________________________________________________________________________________________________________________________________________________    COMPREHENSIVE WEIGHT MANAGEMENT PROGRAM  VIRTUAL SUPPORT GROUPS    For Support Group Information:      We offer support groups  "for patients who are working on weight loss and considering, preparing for or have had weight loss surgery.   There is no cost for this opportunity.  You are invited to attend the?Virtual Support Groups?provided by any of the following locations:    [facility] via Microsoft Teams with Dianne Dias RN  2.   [facility] via Seahorse with Michael Carias, PhD, LP  3.   [place] via Seahorse with Martha Capone RN  4.   [facility] via Njuice Teams with Martha Coates [facility]    The following Support Group information can also be found on our website:  [email or web address]    [facility] Weight Loss Surgery Support Group    [facility] Weight Loss Surgery Support Group  The support group is a patient-lead forum that meets monthly to share experiences, encouragement and education. It is open to those who have had weight loss surgery, are scheduled for surgery, and those who are considering surgery.   WHEN: This group meets on the 3rd Wednesday of each month from 5:00PM - 6:00PM virtually using Microsoft Teams.   FACILITATOR: Led by Dianne Dias RD, LD, RN, the program's Clinical Coordinator.   TO REGISTER: Please contact the clinic via Socialthingt or call the nurse line directly at [phone] to inform our staff that you would like an invite sent to you and to let us know the email you would like the invite sent to. Prior to the meeting, a link with directions on how to join the meeting will be sent to you.    2022 Meetings  January 19: \"Let's Talk\" a time for the group to share.  February 16: \"Let's Talk\" a time for the group to share.  March 16: Guest Speakers: Psychologists, Jacquelyn Alonzo, PhD,LP and Aliyah Lopez PsShaunna,LP  April 20: Guest Speaker: Health , Julissa Caldwell, [facility], CPT  May 18: Guest Speaker: Dietitian, Kenji Arzola, LUCIANO, LP  Gianna 15: \"Let's Talk\" a time for the group to share.  July 20: \"Let's Talk\" " "a time for the group to share.  August 17: TBA  September 21: TBA  October 19: Guest Speaker: Dr Checo Ayers MD Pulmonologist and Sleep Medicine Physician, \"Getting a Good Night's Sleep\".  November 16: TBA  December 21: TBA    [facility] Support Groups    Connections: Bariatric Care Support Group?  This is open to all [facility] (and those external to this program) pre- and post- operative bariatric surgery patients as well as their support system.   WHEN: This group meets the 2nd Tuesday of each month from 6:30 PM - 8:00 PM virtually using Microsoft Teams.   FACILITATOR: Led by Michael Carias, Ph.D who is a Licensed Psychologist with the [facility] Comprehensive Weight Management Program.   TO REGISTER: Please send an email to Michael Carias, Ph.D., LP at?[email or web address]?if you would like an invitation to the group and to learn about using Microsoft Teams.    2022 Meetings  January 11: Carie Hanks, PharmD, Pharmacy Resident at [facility], \"Medications and Bariatric Surgery\".  February 8: Open Forum  March 8: Ramo Vega MD, \"Exercise and Bariatric Surgery\".  April 12  May 10  Gianna 14    Connections: Post-Operative Bariatric Surgery Support Group  This is a support group for [facility] bariatric patients (and those external to [facility]) who have had bariatric surgery and are at least 3 months post-surgery.  WHEN: This support group meets the 4th Wednesday of the month from 11:00 AM - 12:00 PM virtually using Microsoft Teams.   FACILITATOR: Led by Certified Bariatric Nurse, Martha Capone RN.   TO REGISTER: Please send an email to Martha at [email or web address] if you would like an invitation to the group and to learn about using Microsoft Teams.    2022 Meetings  January 26  February 23  March 23  April 27  May 25  Gianna 22    [facility] Healthy Lifestyle Virtual Support " "Group    Healthy Lifestyle Virtual Support Group?  This is 60 minutes of small group guided discussion, support and resources. All are welcome who want a healthy lifestyle.  WHEN: This group meets monthly on a Friday from 12:30 PM - 1:30 PM virtually using Microsoft Teams.   FACILITATOR: Led by National Board Certified Health and , Martha Coates [facility].   TO REGISTER: Please send an email to Martha [email or web address] to receive monthly invites to the group or if you have any questions about having a health .  Prior to the meeting, a link with directions on how to join the meeting will be sent to you.    2022 Meetings  January 21: Rosalina Iqbal MS, RN, CIC, CBN, \"Healthy Habits\"  February 25: Open Forum  March 18: \"Setting Limits and Boundaries\"  April 29: Nisa Leon RD, \"Meal Planning Made Easy\"  May 20: Open Forum  Gianna: To be determined  ____________________________________________________________________________________________________________________________________________________________________________  [facility] Get Moving Program  Our team of physical therapists is trained to help you understand and take control of your condition. They will perform a thorough evaluation to determine your ability for activity and develop a customized plan to fit your goals and physical ability.  Scheduling: Unsure if the Get Moving program is right for you? Discuss the program with your medical provider or diabetes educator. You can also call us at [phone] to ask questions or schedule an appointment.   [facility] Get Moving Program  ____________________________________________________________________________________________________________________________________________________________________________  [facility] Diabetes Prevention Program (DPP)  If you have prediabetes and Medicare please contact us via MyChart to learn more about the Diabetes Prevention Program " (DPP)  Program Details:  [facility] offers the year-long Diabetes Prevention Program (DPP). The program helps you to make lifestyle changes that prevent or delay type 2 diabetes by supporting healthy eating, increased physical activity, stress reduction and use of coping skills.   On average, previous [facility] DPP cohorts have lost and maintained at least 5% of their starting weight throughout the program and averaged more than 150 minutes of physical activity per week.  Participants meet weekly for one-hour group sessions over sixteen weeks, every other week for the next 8 weeks, and monthly for the last six months.   A year-long maintenance program is also available for participants who complete the first year.   Location & Cost:   During the COVID-19 Public Health Emergency, the program is offered virtually. When in-person classes can resume, they will be held at [facility].  For people with Medicare, the program is covered in full. A self-pay option will also be available for those with non-Medicare insurance plans.   _________________________________________________________________________________________________________________________________________________________  Bluetooth Scale:    We hope to provide you with high quality virtual healthcare visits while social distancing for COVID-19 is necessary, as well as in the future when virtual visits may be more convenient for you.     Our technology team made it possible for Bluetooth scales to send weight measurements to our electronic medical record. This allows weights from you weighing at home to securely flow into the medical record, which will improve telephone and virtual visits.   Additionally, studies have shown that adults actually lose more weight when their weights are automatically sent to someone else, and also that this process is not stressful for those adults.    Below is a link for purchasing the  scale, with a discount code for our patients. You may call your insurance company to see if they will reimburse you for the cost of the scale, as a piece of durable medical equipment. The scales only go up to a weight of 400 pounds. This is an issue and we are working with the developer on increasing this. We found no scales that go over 400lb that have blue-tooth for connecting to [employer].    Scale to purchase: the [employer] Interactive  Body  Scale: [email or web address]/body/shop?[email or web address]=aw.ds    Discount Code: We have a discount code for our patients to bring the cost down to $50, Discount code is: [place]_Scale_20%off  _______________________________________________________________________________________________________________________________________________________________________________    To work with a Behavioral Health Psychologist:    Call to schedule:    Edmundo Abraham - [phone]  Yohan Moreno - [phone]  Hedy Jones - [phone]  Aileen Grant - [phone]   Rachel Suarez PhD (cannot accept Medicare) [phone]        Thank you,   [facility] Comprehensive Weight Management Team

## 2022-04-13 NOTE — LETTER
2022       RE: Stephanie Sanches  [address] [address]     Dear Colleague,    Thank you for referring your patient, Stephanie Sanches, to the [facility] [place] [facility]. Please see a copy of my visit note below.    Return Bariatric Surgery Note    RE: Stephanie Sanches  MR#: [id number]  : 1965  VISIT DATE: 2022      Dear Benson Allen,    I had the pleasure of seeing your patient, Stephanie Sanches, in my post-bariatric surgery assessment clinic.    Assessment & Plan   Problem List Items Addressed This Visit        Other    S/P laparoscopic sleeve gastrectomy     1 year s/p sleeve gastrectomy. Feels great. Improved energy. Mood good. Sustained total body weight loss of 36%. Adequate protein. Hydration improving. Still only tolerating about 1/4 cup at a meal. Persistent distaste for foods previously enjoyed but manageable thus far.     No reflux, off PPI. Constipation well controlled with miralax, tapered down to once daily. Increased flatus x 2 months, followed by GI Dr. Mann. Plans in place for hydrogen breath test.     Dr. Zambrano's note from postop recommended follow up with him 1 year postop and a repeat liver biopsy. Dr. Mann will order biopsy. Patient to schedule with Dr. Zambrano.     Plan:  Follow up with Dr. Zambrano  PCP will manage labs per patient   Follow up with myself and dietitian in 1 year                       28 minutes spent on the date of the encounter doing chart review, history and exam, documentation and further activities per the note    CHIEF COMPLAINT: Post-bariatric surgery follow-up    HISTORY OF PRESENT ILLNESS:  Questions Regarding Prior Weight Loss Surgery Reviewed With Patient 2022   I had the following weight loss procedure: Sleeve Gastrectomy   What year was your surgery?    How has your weight changed since your last visit? I have lost  weight   Are you currently taking any weight loss medications? No   Do you currently have any of the following: None of the above   Have you been to the Emergency room since your last visit with us? No   Were you in the hospital since your last visit with us? No   Do you have any concerns today? Skin pulling     F/u with Dr. WYLIE   Liver biopsy     Upper arms, breasts, lower abdomen. All skin is pulling on neck. Has C6-7 fusion which is irritated by the extra weight pulling. Trying more supportive bra and garments. Doing core strengthening.     Hunger/ cravings okay. Still only able to get about 1/4cup at a time. Adequate protein and hydration improving.     Still has some distaste for several foods. No dysphagia except for broccoli.     No reflux, off omeprazole.     Increased flatus x 2 months. Testing for SIBO in a couple of months     Improved energy.   Weight History:     4/9/2022   What is your highest lifetime weight? 385   What is your lowest weight since surgery? (In pounds) 204     Initial Weight (lbs): 326 lbs  Weight: 93.4 kg (206 lb)  Last Visits Weight: 104.3 kg (230 lb)  Cumulative weight loss (lbs): 120  Weight Loss Percentage: 36.81%    Questions Regarding Co-Morbidities and Health Concerns Reviewed With Patient 4/9/2022   Pre-diabetes: Gone away   Diabetes II: Gone away   What was your most recent hemoglobin a1c  -   How many years have you had diabetes? -   High Blood Pressure: Never   High cholesterol: Never   Heartburn/Reflux: Gone away   Are you taking daily medication for heartburn, acid reflux, or GERD (acid reflux disease)? -   Sleep apnea: Never   PCOS: Never   Back pain: Worsened   Joint pain: Worsened   Lower leg swelling: Improved       Eating Habits 4/9/2022   How many meals do you eat per day? 3   Do you snack between meals? Sometimes   How much food are you eating at each meal? Less than 1/2 cup   Are you able to separate your meals and liquids by at least 30 minutes? Yes   Are you able  to avoid liquid calories? Yes       Exercise Questions Reviewed With Patient 4/9/2022   How often do you exercise? 3 to 4 times per week   What is the duration of your exercise (in minutes)? 30 Minutes   What types of exercise do you do? walking, weightlifting, other   What keeps you from being more active?  I am as active as I can possbily be, Pain       Social History:      4/9/2022   Are you smoking? No   Are you drinking alcohol? No       Medications:  Current Outpatient Medications   Medication     calcium citrate-vitamin D (CALCIUM CITRATE +) 315-200 MG-UNIT TABS per tablet     Cetaphil Moisturizing (CETAPHIL) external lotion     cholecalciferol 25 MCG (1000 UT) CHEW     multivitamin w/minerals (MULTI-VITAMIN) tablet     nystatin (MYCOSTATIN) 912087 UNIT/GM external powder     polyethylene glycol (MIRALAX) 17 GM/Dose powder     Probiotic Product (PROBIOTIC-10 PO)     Thiamine HCl (B-1 PO)     No current facility-administered medications for this visit.         4/9/2022   Do you avoid NSAIDs such as (Ibuprofen, Aleve, Naproxen, Advil)?   Yes       ROS:  GI:      4/9/2022   Vomiting: No   Diarrhea: No   Constipation: No   Swallowing trouble: No   Abdominal pain: No   Heartburn: No   Rash in skin folds: Yes   Depression: No   Stress urinary incontinence No     Skin:   BAR RBS ROS - SKIN 4/9/2022   Rash in skin folds: Yes     Psych:      4/9/2022   Depression: No   Anxiety: No     Female Only:   BAR RBS ROS - FEMALE ONLY 4/9/2022   Female only: None of the above       Transferred Records on 05/11/2021   Component Date Value Ref Range Status     TSH 05/11/2021 3.02  0.40 - 3.99 uIU/mL Final     Cholesterol 05/11/2021 164  114 - 200 mg/dL Final     Triglycerides 05/11/2021 133  10 - 200 mg/dL Final     HDL Cholesterol 05/11/2021 50  40 - 60 mg/dL Final     LDL Cholesterol Calculated 05/11/2021 87  <100 mg/dL Final     Potassium 05/11/2021 3.9  3.4 - 5.1 mEq/L Final     Creatinine 05/11/2021 0.78  0.40 - 1.00 mg/dL  "Final     GFR Estimate 05/11/2021 >60  >60 ml/min/1.73m2 Final     Glucose 05/11/2021 126 (A) 70 - 99 mg/dL Final     AST 05/11/2021 22  10 - 40 IU/L Final     ALT 05/11/2021 19  6 - 31 IU/L Final     Hemoglobin A1C POCT 05/11/2021 5.8 (A) 4.0 - 5.6 % Final         PHYSICAL EXAM:  Objective    Ht 1.715 m (5' 7.5\")   Wt 93.4 kg (206 lb)   BMI 31.79 kg/m           Vitals:  No vitals were obtained today due to virtual visit.    Physical Exam   GENERAL: Healthy, alert and no distress  EYES: Eyes grossly normal to inspection.  No discharge or erythema, or obvious scleral/conjunctival abnormalities.  RESP: No audible wheeze, cough, or visible cyanosis.  No visible retractions or increased work of breathing.    SKIN: Visible skin clear. No significant rash, abnormal pigmentation or lesions.  NEURO: Cranial nerves grossly intact.  Mentation and speech appropriate for age.  PSYCH: Mentation appears normal, affect normal/bright, judgement and insight intact, normal speech and appearance well-groomed.        Sincerely,    Farida Seymour NP      During this virtual visit the patient is located in [place], patient verifies this as the location during the entirety of this visit.     Lorena is a 56 year old who is being evaluated via a billable video visit.      How would you like to obtain your AVS? MyChart  If the video visit is dropped, the invitation should be resent by: Text to cell phone:  [phone]  Will anyone else be joining your video visit? No      Video Start Time: 3:04 PM  Video-Visit Details    Type of service:  Video Visit    Video End Time:3:25 PM    Originating Location (pt. Location): Home    Distant Location (provider location):  [facility] [place]     Platform used for Video Visit: Danelle Alfaro NREMT    "

## 2022-04-13 NOTE — ASSESSMENT & PLAN NOTE
1 year s/p sleeve gastrectomy. Feels great. Improved energy. Mood good. Sustained total body weight loss of 36%. Adequate protein. Hydration improving. Still only tolerating about 1/4 cup at a meal. Persistent distaste for foods previously enjoyed but manageable thus far.     No reflux, off PPI. Constipation well controlled with miralax, tapered down to once daily. Increased flatus x 2 months, followed by GI Dr. Mann. Plans in place for hydrogen breath test.     Dr. Zambrano's note from postop recommended follow up with him 1 year postop and a repeat liver biopsy. Dr. Mann will order biopsy. Patient to schedule with Dr. Zambrano.     Plan:  Follow up with Dr. Zambrano  PCP will manage labs per patient   Follow up with myself and dietitian in 1 year

## 2022-04-25 ENCOUNTER — TRANSFERRED RECORDS (OUTPATIENT)
Dept: HEALTH INFORMATION MANAGEMENT | Facility: CLINIC | Age: 57
End: 2022-04-25
Payer: COMMERCIAL

## 2022-06-03 ENCOUNTER — TELEPHONE (OUTPATIENT)
Dept: ENDOCRINOLOGY | Facility: CLINIC | Age: 57
End: 2022-06-03
Payer: COMMERCIAL

## 2022-06-03 NOTE — TELEPHONE ENCOUNTER
LVM to daryl 6/15 Dr. Zambrano appt provider not in clinic. Please schedule patient next available

## 2022-07-26 VITALS — BODY MASS INDEX: 30.31 KG/M2 | HEIGHT: 68 IN | WEIGHT: 200 LBS

## 2022-07-26 ASSESSMENT — PAIN SCALES - GENERAL: PAINLEVEL: NO PAIN (0)

## 2022-07-26 NOTE — NURSING NOTE
"(   Chief Complaint   Patient presents with     RECHECK     1 year post op, sleeve gastrectomy. 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)     Diarrhea, functional     Elevated liver function tests     Family history of ovarian cancer     Hyperlipidemia     Irritable bowel syndrome     NAM (nonalcoholic steatohepatitis)     Osteoarthrosis involving lower leg     Vitamin D deficiency     S/P laparoscopic sleeve gastrectomy    )  (   Current Outpatient Medications   Medication Sig Dispense Refill     calcium citrate-vitamin D (CITRACAL) 315-200 MG-UNIT TABS per tablet Take 1 tablet by mouth 2 times daily       Cetaphil Moisturizing (CETAPHIL) external lotion Apply topically as needed       cholecalciferol 25 MCG (1000 UT) CHEW        multivitamin w/minerals (THERA-VIT-M) tablet Take 1 tablet by mouth 2 times daily       nystatin (MYCOSTATIN) 712547 UNIT/GM external powder Apply topically 2 times daily as needed (rashes in skin folds) 60 g 1     polyethylene glycol (MIRALAX) 17 GM/Dose powder Take 1 packet by mouth       Probiotic Product (PROBIOTIC-10 PO) Take 1 capsule by mouth every morning        Thiamine HCl (B-1 PO)       )  ( Diabetes Eval:    )    ( Pain Eval:  No Pain (0) )    ( Wound Eval:       )    (   History   Smoking Status     Never Smoker   Smokeless Tobacco     Never Used    )    ( Signed By:  Yifan Richards, EMT; July 26, 2022; 1:43 PM )    "

## 2022-07-26 NOTE — PROGRESS NOTES
Lorena is a 56 year old who is being evaluated via a billable video visit.      How would you like to obtain your AVS? blur Gr[employer]  If the video visit is dropped, the invitation should be resent by:  [phone]  Will anyone else be joining your video visit? No      During this virtual visit the patient is located in [place], patient verifies this as the location during the entirety of this visit.     This is a 13-minute [facility] video visit starting at 7:15 AM ending at 7:28 AM.  This is a very pleasant 56-year-old female with known NAM and fibrosis stage II who presents for follow-up status post sleeve gastrectomy.  Patient underwent sleeve gastrectomy on April 5, 2021.  Her highest weight ever was 385 pounds.  She lost approximately 25 pounds before surgery.  And she currently has been stable for the last several weeks at 200 pounds.  She feels well.  She does complain of gas occasionally and she is being tested for bacterial overgrowth in [place].  She complains of significant issues with excess skin.  Both shoulder pain and pain around the chest from excess skin as well as under her panniculus and around her bellybutton.  In particular umbilicus does have frequent rashes that are problematic for her.  She did have a liver biopsy done in April of this year.  This showed dramatic improvement in her fibrosis down to approximately stage I-II and a FibroScan that showed no evidence of fatty liver disease.  We will request her slides for some of our research work to better understand some of the gene changes related to this as well as just to review them clinically.  The patient has had some blood work done I do not immediately see her vitamin a level as well as a vitamin B-12/thiamine.  We will follow her annually.  I also like to make referral to medically supervised weight loss to ensure that she has continued stability of her weight loss.

## 2022-07-27 ENCOUNTER — VIRTUAL VISIT (OUTPATIENT)
Dept: ENDOCRINOLOGY | Facility: CLINIC | Age: 57
End: 2022-07-27
Payer: COMMERCIAL

## 2022-07-27 ENCOUNTER — TELEPHONE (OUTPATIENT)
Dept: SURGERY | Facility: CLINIC | Age: 57
End: 2022-07-27

## 2022-07-27 ENCOUNTER — MYC MEDICAL ADVICE (OUTPATIENT)
Dept: ENDOCRINOLOGY | Facility: CLINIC | Age: 57
End: 2022-07-27

## 2022-07-27 DIAGNOSIS — K75.81 NASH (NONALCOHOLIC STEATOHEPATITIS): ICD-10-CM

## 2022-07-27 DIAGNOSIS — E66.01 MORBID OBESITY (H): Primary | ICD-10-CM

## 2022-07-27 DIAGNOSIS — Z98.84 S/P LAPAROSCOPIC SLEEVE GASTRECTOMY: ICD-10-CM

## 2022-07-27 DIAGNOSIS — E11.69 DIABETES MELLITUS TYPE 2 IN OBESE: ICD-10-CM

## 2022-07-27 DIAGNOSIS — K75.81 NASH (NONALCOHOLIC STEATOHEPATITIS): Primary | ICD-10-CM

## 2022-07-27 DIAGNOSIS — E66.9 DIABETES MELLITUS TYPE 2 IN OBESE: ICD-10-CM

## 2022-07-27 DIAGNOSIS — E55.9 VITAMIN D DEFICIENCY: ICD-10-CM

## 2022-07-27 DIAGNOSIS — E78.5 HYPERLIPIDEMIA: ICD-10-CM

## 2022-07-27 PROCEDURE — 99212 OFFICE O/P EST SF 10 MIN: CPT | Mod: GT | Performed by: SURGERY

## 2022-07-27 NOTE — LETTER
7/27/2022       RE: Stephanie Sanches  [address] [address]     Dear Colleague,    Thank you for referring your patient, Stephanie Sanches, to the [facility] [place] at [facility]. Please see a copy of my visit note below.    Lorena is a 56 year old who is being evaluated via a billable video visit.      How would you like to obtain your AVS? MyChart  If the video visit is dropped, the invitation should be resent by:  [phone]  Will anyone else be joining your video visit? No      During this virtual visit the patient is located in [place], patient verifies this as the location during the entirety of this visit.     This is a 13-minute [employer] video visit starting at 7:15 AM ending at 7:28 AM.  This is a very pleasant 56-year-old female with known NAM and fibrosis stage II who presents for follow-up status post sleeve gastrectomy.  Patient underwent sleeve gastrectomy on April 5, 2021.  Her highest weight ever was 385 pounds.  She lost approximately 25 pounds before surgery.  And she currently has been stable for the last several weeks at 200 pounds.  She feels well.  She does complain of gas occasionally and she is being tested for bacterial overgrowth in [place].  She complains of significant issues with excess skin.  Both shoulder pain and pain around the chest from excess skin as well as under her panniculus and around her bellybutton.  In particular umbilicus does have frequent rashes that are problematic for her.  She did have a liver biopsy done in April of this year.  This showed dramatic improvement in her fibrosis down to approximately stage I-II and a FibroScan that showed no evidence of fatty liver disease.  We will request her slides for some of our research work to better understand some of the gene changes related to this as well as just to review them clinically.  The patient has had some blood work done  I do not immediately see her vitamin a level as well as a vitamin B-12/thiamine.  We will follow her annually.  I also like to make referral to medically supervised weight loss to ensure that she has continued stability of her weight loss.      Martin Zambrano MD

## 2022-07-27 NOTE — Clinical Note
Please request the patient slides for clinical review from her liver biopsy.  We will need these from her biopsy 1 year ago as well as her biopsy most recently in [place].  Precious please reach out to her as we need to consent her to review her liver biopsy tissue and we will need to request the slides blocks from [place] for our analysis.  Also, she needs to see medically supervised weight loss.  I will see her in 1 year

## 2022-07-27 NOTE — Clinical Note
Just resending.  Patient needs to see his medically supervised weight loss.  We need to double check that she has a vitamin A, thiamine and B12 done at her clinic in [place] please do that Ynes.  Precious I like to request to her liver biopsy blocks from [place] so that we can do spatial genomic analysis as well as review the histology.

## 2022-07-29 NOTE — TELEPHONE ENCOUNTER
Faxed lab orders to [facility], fax number: [phone].    Hanh Carrero, EMT

## 2022-08-15 ENCOUNTER — TELEPHONE (OUTPATIENT)
Dept: ENDOCRINOLOGY | Facility: CLINIC | Age: 57
End: 2022-08-15

## 2022-08-15 NOTE — TELEPHONE ENCOUNTER
Mallory from [facility] is looking for your 'Fed Ex acct number' to ship the requested records.    Mallory: [phone]

## 2022-08-16 ENCOUNTER — TELEPHONE (OUTPATIENT)
Dept: ENDOCRINOLOGY | Facility: CLINIC | Age: 57
End: 2022-08-16

## 2022-08-16 NOTE — TELEPHONE ENCOUNTER
[facility] called again for the FedEx number  Can someone please call her back today?    Mallory from [facility] is looking for your 'Fed Ex acct number' to ship the requested records.     Mallory: [phone]

## 2022-08-16 NOTE — TELEPHONE ENCOUNTER
Patient was contacted by team member in [facility] , Dany Bustamante. He confirmed that [facility] has received a shipping label from me and that according to the [employer] portal, the slides should arrive to the [facility] by 10:30am tomorrow. It should be trackable under the following number: [phone].

## 2022-09-08 ENCOUNTER — TELEPHONE (OUTPATIENT)
Dept: ENDOCRINOLOGY | Facility: CLINIC | Age: 57
End: 2022-09-08

## 2022-09-08 NOTE — TELEPHONE ENCOUNTER
[facility] sent my chart message letting pt know that the provider is unavailable and will need to reschedule.     Reschedule with [facility] next available

## 2022-09-24 ENCOUNTER — HEALTH MAINTENANCE LETTER (OUTPATIENT)
Age: 57
End: 2022-09-24

## 2022-09-28 NOTE — PROGRESS NOTES
Return Bariatric Surgery Note    RE: Stephanie Sanches  MR#: [id number]  : 1965  VISIT DATE: Sep 29, 2022      Dear Benson Allen,    I had the pleasure of seeing your patient, Stephanie Sanches, in my post-bariatric surgery assessment clinic.    Assessment & Plan   Problem List Items Addressed This Visit        Digestive    Class 1 obesity with serious comorbidity and body mass index (BMI) of 30.0 to 30.9 in adult - Primary     Weight stable 1.5 years s/p sleeve with sustained weight loss of 39% total body weight loss. Hunger and cravings well controlled. Significant improvement in NAM and DMII. BMI now 30 with weight at 298. Weight stable for the last several months. She would ideally like to try to get to a weight of 180. Would like to consider AOM to assist with this.     Continues to have limited hunger/ cravings. Distaste in food is still persistent. Adequate protein and hydration. Food is now fuel rather than pleasure. Question if there is a component of insulin resistance impacting weight loss with hx of DMII. Would like to see if GLP1 is covered. On victoza preop but quite expensive. We'll see what is covered now with semaglutide.       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 1.5  years s/p sleeve with  Juan Manuel.    HISTORY OF PRESENT ILLNESS:  Questions Regarding Prior Weight Loss Surgery Reviewed With Patient 4/9/2022   I had the following weight loss procedure: Sleeve Gastrectomy   What year was your surgery? 2021   How has your weight changed since your last visit? I have lost weight   Are you currently taking any weight loss medications? No   Do you currently have any of the following: None of the above   Have you been to the Emergency room since your last visit with us? No   Were you in the hospital since your last visit with us? No   Do you have any concerns today? Skin pulling     I saw patient last 4/2022- 36% sustained total weight loss. Struggling with distaste of food but manageable. No reflux off PPI. Due for follow up with GI for NAM.     Covid+ 5/2022     Dr. Zambrano 7/27/2022- excess skin causing shoulder and back pain- excess skin at chest and back and pannus and umbilicus. Reduced fibrosis to stage 1-2 with no evidence of persistent fatty liver.      9/4/2022- bloating and gas with GI follow up, started on benefiber continues miralax for constipation.     Ultimate goal would be 180 but feels really good where she is is. Continues to carry some weight centrally.     Anti-obesity medications:     Current:   None     Failed/contraindicated:   Metformin preop- diarrhea    victoza preop- diarrhea     Recent diet changes:   Breakfast- high protein yogurt, fruit, honey, protein granola   Lunch- 3oz protein (turkey/ cottage cheese)  Dinner- same  Snack at night- protein   Continues doing protein shake daily     Finding more frozen options for when she is busy     -Protein? 45-60  -Water adequate     Recent exercise/activity changes: lots of energy, very active     Excess skin on arms are continuing     Recent stressors: 2nd job now- full time -     Vitamins/Labs: [facility] [employer] - b12, A, LFT, CBC, B1 all within normal limits. Unable to see PTH and D     Weight  History:     4/9/2022   What is your highest lifetime weight? 385   What is your lowest weight since surgery? (In pounds) 204     Initial Weight (lbs): 326 lbs  Weight: 89.8 kg (198 lb)  Last Visits Weight: 90.7 kg (200 lb)  Cumulative weight loss (lbs): 128  Weight Loss Percentage: 39.26%    Questions Regarding Co-Morbidities and Health Concerns Reviewed With Patient 4/9/2022   Pre-diabetes: Gone away   Diabetes II: Gone away   What was your most recent hemoglobin a1c  -   How many years have you had diabetes? -   High Blood Pressure: Never   High cholesterol: Never   Heartburn/Reflux: Gone away   Are you taking daily medication for heartburn, acid reflux, or GERD (acid reflux disease)? -   Sleep apnea: Never   PCOS: Never   Back pain: Worsened   Joint pain: Worsened   Lower leg swelling: Improved       Eating Habits 4/9/2022   How many meals do you eat per day? 3   Do you snack between meals? Sometimes   How much food are you eating at each meal? Less than 1/2 cup   Are you able to separate your meals and liquids by at least 30 minutes? Yes   Are you able to avoid liquid calories? Yes       Exercise Questions Reviewed With Patient 4/9/2022   How often do you exercise? 3 to 4 times per week   What is the duration of your exercise (in minutes)? 30 Minutes   What types of exercise do you do? walking, weightlifting, other   What keeps you from being more active?  I am as active as I can possbily be, Pain       Social History:      4/9/2022   Are you smoking? No   Are you drinking alcohol? No       Medications:  Current Outpatient Medications   Medication     semaglutide (OZEMPIC) 2 MG/1.5ML SOPN pen     calcium citrate-vitamin D (CITRACAL) 315-200 MG-UNIT TABS per tablet     Cetaphil Moisturizing (CETAPHIL) external lotion     cholecalciferol 25 MCG (1000 UT) CHEW     multivitamin w/minerals (THERA-VIT-M) tablet     nystatin (MYCOSTATIN) 074655 UNIT/GM external powder     polyethylene glycol (MIRALAX) 17 GM/Dose  "powder     Probiotic Product (PROBIOTIC-10 PO)     Thiamine HCl (B-1 PO)     No current facility-administered medications for this visit.         4/9/2022   Do you avoid NSAIDs such as (Ibuprofen, Aleve, Naproxen, Advil)?   Yes       ROS:  GI:      4/9/2022   Vomiting: No   Diarrhea: No   Constipation: No   Swallowing trouble: No   Abdominal pain: No   Heartburn: No   Rash in skin folds: Yes   Depression: No   Stress urinary incontinence No     Skin:   BAR RBS ROS - SKIN 4/9/2022   Rash in skin folds: Yes     Psych:      4/9/2022   Depression: No   Anxiety: No     Female Only:   BAR RBS ROS - FEMALE ONLY 4/9/2022   Female only: None of the above       Transferred Records on 05/11/2021   Component Date Value Ref Range Status     TSH 05/11/2021 3.02  0.40 - 3.99 uIU/mL Final     Cholesterol 05/11/2021 164  114 - 200 mg/dL Final     Triglycerides 05/11/2021 133  10 - 200 mg/dL Final     HDL Cholesterol 05/11/2021 50  40 - 60 mg/dL Final     LDL Cholesterol Calculated 05/11/2021 87  <100 mg/dL Final     Potassium 05/11/2021 3.9  3.4 - 5.1 mEq/L Final     Creatinine 05/11/2021 0.78  0.40 - 1.00 mg/dL Final     GFR Estimate 05/11/2021 >60  >60 ml/min/1.73m2 Final     Glucose 05/11/2021 126 (A) 70 - 99 mg/dL Final     AST 05/11/2021 22  10 - 40 IU/L Final     ALT 05/11/2021 19  6 - 31 IU/L Final     Hemoglobin A1C 05/11/2021 5.8 (A) 4.0 - 5.6 % Final         PHYSICAL EXAM:  Objective    Ht 1.715 m (5' 7.5\")   Wt 89.8 kg (198 lb)   BMI 30.55 kg/m           Vitals:  No vitals were obtained today due to virtual visit.    Physical Exam   GENERAL: Healthy, alert and no distress  EYES: Eyes grossly normal to inspection.  No discharge or erythema, or obvious scleral/conjunctival abnormalities.  RESP: No audible wheeze, cough, or visible cyanosis.  No visible retractions or increased work of breathing.    SKIN: Visible skin clear. No significant rash, abnormal pigmentation or lesions.  NEURO: Cranial nerves grossly intact.  " Mentation and speech appropriate for age.  PSYCH: Mentation appears normal, affect normal/bright, judgement and insight intact, normal speech and appearance well-groomed.        Sincerely,    Farida Seymour NP      Virtual Visit Check-In    During this virtual visit the patient is located in [place], patient verifies this as the location during the entirety of this visit.     Lorena is a 57 year old who is being evaluated via a billable video visit.      How would you like to obtain your AVS? MyChart  If the video visit is dropped, the invitation should be resent by: Text to cell phone: [phone]  Will anyone else be joining your video visit? No        Video-Visit Details    Video Start Time: 1135    Type of service:  Video Visit    Video End Time:11:54 AM    Originating Location (pt. Location): Home    Distant Location (provider location):  [facility] WEIGHT MANAGEMENT CLINIC [place]     Platform used for Video Visit: Danelle Alfaro NREMT

## 2022-09-29 ENCOUNTER — TELEPHONE (OUTPATIENT)
Dept: ENDOCRINOLOGY | Facility: CLINIC | Age: 57
End: 2022-09-29

## 2022-09-29 ENCOUNTER — VIRTUAL VISIT (OUTPATIENT)
Dept: ENDOCRINOLOGY | Facility: CLINIC | Age: 57
End: 2022-09-29
Payer: COMMERCIAL

## 2022-09-29 VITALS — HEIGHT: 68 IN | BODY MASS INDEX: 30.01 KG/M2 | WEIGHT: 198 LBS

## 2022-09-29 DIAGNOSIS — E11.8 DIABETES MELLITUS TYPE 2 WITH COMPLICATIONS (H): ICD-10-CM

## 2022-09-29 DIAGNOSIS — E66.811 CLASS 1 OBESITY WITH SERIOUS COMORBIDITY AND BODY MASS INDEX (BMI) OF 30.0 TO 30.9 IN ADULT, UNSPECIFIED OBESITY TYPE: Primary | ICD-10-CM

## 2022-09-29 DIAGNOSIS — K75.81 NASH (NONALCOHOLIC STEATOHEPATITIS): ICD-10-CM

## 2022-09-29 DIAGNOSIS — Z98.84 S/P LAPAROSCOPIC SLEEVE GASTRECTOMY: ICD-10-CM

## 2022-09-29 PROCEDURE — 99214 OFFICE O/P EST MOD 30 MIN: CPT | Mod: GT | Performed by: NURSE PRACTITIONER

## 2022-09-29 NOTE — PATIENT INSTRUCTIONS
"Thank you for allowing us the privilege of caring for you. We hope we provided you with the excellent service you deserve.   Please let us know if there is anything else we can do for you so that we can be sure you are completely satisfied with your care experience.    To ensure the quality of our services you may be receiving a patient satisfaction survey from an independent patient satisfaction monitoring company.    The greatest compliment you can give is a \"Likely to Recommend\"    Your visit was with Farida Seymour NP today.    Instructions per today's visit:     Rickey Sanches, it was great to visit with you today.  Here is a review of our visit.  If our clinic scheduler is not able to reach you please call [phone] to schedule your next appointments.    Plan  Start ozempic  Continue to monitor protein- okay to skip snacks/ protein shake if getting adequate  Follow up 3 months  Consider sooner follow up with RD if needed       Information about Video Visits with [employer] [facility]: video visit information  _________________________________________________________________________________________________________________________________________________________  If you are asked by your clinic team to have your blood pressure checked:  [facility] do offer several locations for blood pressure checks. Please follow the below link to schedule an appointment. Scheduling an appointment at the pharmacy for a blood pressure check is now preferred.    Appointment Plus (appointment-plus.Retail Convergence)  _________________________________________________________________________________________________________________________________________________________  Important contact and scheduling information:  Please call our contact center at [phone] to schedule your next appointments.  To find a lab location near you, please call [phone].  For any nursing questions or concerns call Ynes Osborne LPN at [phone] or " Ruth Arora RN at [phone]  Please call during clinic hours Monday through Friday 8:00a - 4:00p if you have questions or you can contact us via IPexpertt at anytime and we will reply during clinic hours.    Lab results will be communicated through My Chart or letter (if My Chart not used). Please call the clinic if you have not received communication after 1 week or if you have any questions.?  Clinic Fax: [phone]    _________________________________________________________________________________________________________________________________________________________  Meal Replacement Products:    Here is the link to our new e-store where you can purchase our meal replacement products     [facility] E-Store  Portable Zoo/store    The one week starter kit is a great way to sample a variety of products and see what works for you.    If you want more information about the product go to: Touch Bion[employer].Diagonal View    If you are an employee or [facility] or [facility] please contact your care team for a 10% estore discount    Free Shipping for orders over $75     Benefits of meal replacements products:    Portion and calorie control  Improved nutrition  Structured eating  Simplified food choices  Avoid contact with trigger foods  _________________________________________________________________________________________________________________________________________________________  Interested in working with a health ?  Health coaches work with you to improve your overall health and wellbeing.  They look at the whole person, and may involve discussion of different areas of life, including, but not limited to the four pillars of health (sleep, exercise, nutrition, and stress management). Discuss with your care team if you would like to start working a health .  Health Coaching-3 Pack: Schedule by calling [phone]    $99 for three health coaching  visits    Visits may be done in person or via phone    Coaching is a partnership between the  and the client; Coaches do not prescribe or diagnose    Coaching helps inspire the client to reach his/her personal goals   _________________________________________________________________________________________________________________________________________________________  24 Week Healthy Lifestyle Plan:    Our mission in the 24-week Healthy Lifestyle Plan is to provide you with individualized care by giving you the tools, education and support you need to lose weight and maintain a healthy lifestyle. In your 24-week journey, you ll be supported by a dedicated weight loss team that includes registered dietitians, medical weight management providers, health coaches, and nurses -- all with special expertise in weight loss -- to help you every step of the way.     Monthly meetings with your registered dietician or medical weight management provider help to review your progress, update your care plan, and make any adjustments needed to ensure success. Between these visits, weekly and bi-weekly health  visits will help you focus on the four pillars of weight loss -- stress, sleep, nutrition, and exercise -- and how you can best adapt each to achieve sustainable weight loss results.    In addition, you will be given exclusive access to online wellbeing classes through BlogGlue.  Your initial visit will be with a medical weight management provider who will help to understand your weight loss goals and ensure this program is the right fit for you. Please let our team know if you are interested in the 24 week plan by sending a message to your care team or calling [phone] to schedule.  _________________________________________________________________________________________________________________________________________________________    COMPREHENSIVE WEIGHT MANAGEMENT PROGRAM  VIRTUAL SUPPORT GROUPS    For  "Support Group Information:      We offer support groups for patients who are working on weight loss and considering, preparing for or have had weight loss surgery.   There is no cost for this opportunity.  You are invited to attend the?Virtual Support Groups?provided by any of the following locations:    [facility] via Microsoft Teams with Dianne Dias RN  2.   [facility] via [employer] with Michael Carias, PhD, LP  3.   [facility] via [employer] with Matrha Capone RN  4.   [facility] via Jump or Fall Teams with Martha Coates [facility]    The following Support Group information can also be found on our website:  [email or web address]    [facility] Weight Loss Surgery Support Group    [facility] Weight Loss Surgery Support Group  The support group is a patient-lead forum that meets monthly to share experiences, encouragement and education. It is open to those who have had weight loss surgery, are scheduled for surgery, and those who are considering surgery.   WHEN: This group meets on the 3rd Wednesday of each month from 5:00PM - 6:00PM virtually using Microsoft Teams.   FACILITATOR: Led by Dianne Dias RD, TANIA, RN, the program's Clinical Coordinator.   TO REGISTER: Please contact the clinic via Cabara or call the nurse line directly at [phone] to inform our staff that you would like an invite sent to you and to let us know the email you would like the invite sent to. Prior to the meeting, a link with directions on how to join the meeting will be sent to you.    2022 Meetings  Gianna 15: \"Let's Talk\" a time for the group to share.  July 20: \"Let's Talk\" a time for the group to share.  August 17: \"Let's Talk\" a time for the group to share.  September 21: \"Let's Talk\" a time for the group to share.  October 19: Guest Speaker: Dr Checo Ayers MD Pulmonologist and Sleep Medicine Physician, \"Getting a Good Night's " "Sleep\".  November 16: \"Let's Talk\" a time for the group to share.  December 21: \"Let's Talk\" a time for the group to share.    [facility] and [facility] Support Groups    Connections: Bariatric Care Support Group?  This is open to all [facility] (and those external to this program) pre- and post- operative bariatric surgery patients as well as their support system.   WHEN: This group meets the 2nd Tuesday of each month from 6:30 PM - 8:00 PM virtually using Microsoft Teams.   FACILITATOR: Led by Michael Carias, Ph.D who is a Licensed Psychologist with the [facility] Comprehensive Weight Management Program.   TO REGISTER: Please send an email to Michael Carias, Ph.D.,  at?[email or web address]?if you would like an invitation to the group and to learn about using Microsoft Teams.    2022 Meetings  June 14: Socorro Quintanilla, LUCIANO, LD at [facility], \"Nutritional Labeling\"  July 12 August 2 (Please Note Date Change)  September 13 October 11 November 8 December 13    Connections: Post-Operative Bariatric Surgery Support Group  This is a support group for [facility] bariatric patients (and those external to [facility]) who have had bariatric surgery and are at least 3 months post-surgery.  WHEN: This support group meets the 4th Wednesday of the month from 11:00 AM - 12:00 PM virtually using Microsoft Teams.   FACILITATOR: Led by Certified Bariatric Nurse, Martha Capone RN.   TO REGISTER: Please send an email to Martha at [email or web address] if you would like an invitation to the group and to learn about using Microsoft Teams.    2022 Meetings June 22 July 27 August 24 September 28 October 26 November 23 December 28      [facility] Healthy Lifestyle Virtual Support Group    Healthy Lifestyle Virtual Support Group?  This is 60 minutes of small group guided discussion, support and resources. All are " "welcome who want a healthy lifestyle.  WHEN: This group meets monthly on a Friday from 12:30 PM - 1:30 PM virtually using Microsoft Teams.   FACILITATOR: Led by National Board Certified Health and , Martha Coates [facility].   TO REGISTER: Please send an email to Martha [email or web address] to receive monthly invites to the group or if you have any questions about having a health .  Prior to the meeting, a link with directions on how to join the meeting will be sent to you.    2022 Meetings  June 24: Martha Coates [facility], \"Setting Limits and Boundaries\".  Jul 29: Open Forum  August 26: Guest Speaker: Naida Gay Registered Dietitian  September 30: Open Forum  October 28th: Guest Speaker: Fide Hernandez [facility], Health , \"Gratitude Practices\".  November 18: Guest Speaker: Nisa Leon RD Registered Dietitian, \"Navigating How to Eat around the Holidays\".  December 16: Guest Speaker: Julissa Caldwell [facility], \"Changing Your Relationship with Movement\".    ____________________________________________________________________________________________________________________________________________________________________________  [facility] Get Moving Program  Our team of physical therapists is trained to help you understand and take control of your condition. They will perform a thorough evaluation to determine your ability for activity and develop a customized plan to fit your goals and physical ability.  Scheduling: Unsure if the Get Moving program is right for you? Discuss the program with your medical provider or diabetes educator. You can also call us at [phone] to ask questions or schedule an appointment.   [facility] Get Moving Program  ____________________________________________________________________________________________________________________________________________________________________________  [facility] Diabetes Prevention Program (DPP)  If you have " prediabetes and Medicare please contact us via [employer] to learn more about the Diabetes Prevention Program (DPP)  Program Details:  [facility] offers the year-long Diabetes Prevention Program (DPP). The program helps you to make lifestyle changes that prevent or delay type 2 diabetes by supporting healthy eating, increased physical activity, stress reduction and use of coping skills.   On average, previous [facility] DPP cohorts have lost and maintained at least 5% of their starting weight throughout the program and averaged more than 150 minutes of physical activity per week.  Participants meet weekly for one-hour group sessions over sixteen weeks, every other week for the next 8 weeks, and monthly for the last six months.   A year-long maintenance program is also available for participants who complete the first year.   Location & Cost:   During the COVID-19 Public Health Emergency, the program is offered virtually. When in-person classes can resume, they will be held at [facility].  For people with Medicare, the program is covered in full. A self-pay option will also be available for those with non-Medicare insurance plans.   _________________________________________________________________________________________________________________________________________________________  Bluetooth Scale:    We hope to provide you with high quality virtual healthcare visits while social distancing for COVID-19 is necessary, as well as in the future when virtual visits may be more convenient for you.     Our technology team made it possible for Bluetooth scales to send weight measurements to our electronic medical record. This allows weights from you weighing at home to securely flow into the medical record, which will improve telephone and virtual visits.   Additionally, studies have shown that adults actually lose more weight when their weights are automatically sent to  someone else, and also that this process is not stressful for those adults.    Below is a link for purchasing the scale, with a discount code for our patients. You may call your insurance company to see if they will reimburse you for the cost of the scale, as a piece of durable medical equipment. The scales only go up to a weight of 400 pounds. This is an issue and we are working with the developer on increasing this. We found no scales that go over 400lb that have blue-tooth for connecting to [employer].    Scale to purchase: the [employer]  Body  Scale: [email or web address].ds    Discount Code: We have a discount code for our patients to bring the cost down to $50, Discount code is: [place]_Scale_20%off  _______________________________________________________________________________________________________________________________________________________________________________    To work with a Behavioral Health Psychologist:    Call to schedule:    Edmundo Abraham - [phone]  Yohan Moreno - [phone]  Hedy Jones - [phone]  Aileen Grant - [phone]   Rachel Suarez PhD (cannot accept Medicare) [phone]        Thank you,   [facility] Comprehensive Weight Management Team    MEDICATION STARTED AT THIS APPOINTMENT    We are starting a GLP-1 (Glucagon-like Peptide-1) medication. Examples of this medication include Byetta, Bydureon, or Victoza, etc. The medication chosen will depend on insurance coverage, and can be changed to the medication that is covered under your plan. These medications work the same, in that they can help you lose weight and control your blood sugar. One of the ways it works is by slowing down the rate that food leaves your stomach. You feel camilo and will eat less.  It also helps regulate hormones that can help improve your blood sugars.    Usually short acting insulins or  oral agents are stopped or decreased by the doctor at the appointment. Low blood sugars are rare but can happen if patients are on insulin or other oral agents. If you notice consistent low sugars or high sugars, your medication may need to be adjusted after your appointment. If this is the case, please call RN and provide her your blood sugar record from the last 3-4 days. The RN will get in touch with the doctor and call you back/Open Placeshart message with recommendations. We tolerate high sugars for a bit, so if sugars are running 180-200, this is ok. As weight starts dropping the blood sugars should too. If readings are consistently over 200 for 1-2 weeks, then you should call the doctor/nurse.    Types of GLP-1 medications include:    Victoza: Starting dose is 0.6 mg for a week, then 1.2 mg for a week, and then 1.8 mg thereafter (if prescribed by doctor). This medication is given daily. Pen needles need to be ordered also.    Ozempic: Starting dose is 0.25 mg once weekly for 4 weeks, and then increase to 0.5 mg weekly. If after 4 weeks of being on 0.5 mg weekly dosing and still wanting additional weight loss and/or blood sugar benefits, check with your doctor to see if they want to increase the dose to 1 mg weekly. Pen needles come in the Ozempic pen box.    Trulicity: Starting dose is 0.75 mg once weekly.  If after 1-3 months of being on 0.75 mg weekly and still wanting additional weight loss and/or blood sugar benefits, check with your doctor to see if they want to increase the dose to 1.5 mg weekly.    Bydureon: Starting dose is 2 mg and is given weekly. The patient should pick one day a week and give the medication on that day. Pen needles need to be ordered also.    Byetta: Starting dose is 5 mcg twice daily. This is given for the first month. Check with the doctor after a month to see if they want the dose increased to 10 mcg BID. Pen needles need to be ordered also.     Side effects of GLP- Medications  include: The most common side effects are all GI related and consist of: nausea, constipation, diarrhea, burping, or gassiness. Patients are advised to eat slowly and less, and nausea typically passes if people can stick it out.     The risk of pancreatitis (inflammation of the pancreas) has been associated with this type of medication, but is very rare.  If you have had pancreatitis in the past, this medication may not be for you. Please let us know about any past history of pancreas problems.      Symptoms of pancreatitis include: Pain in your upper stomach area which  may travel to your back and be worse after eating. Your stomach area may be tender to the touch.  You may have vomiting or nausea and/or have a fever. If you should develop any of these symptoms, stop the medication and contact your primary care doctor. They will do a blood test to check for pancreatitis.         There is a small chance you may have some low blood sugar after taking the medication.   The signs of low blood sugar are:  Weakness  Shaky   Hungry  Sweating  Confusion      See below for ways to treat low blood sugar without adding in lots of extra calories.      Treating Low Blood Sugar    If you have symptoms of low blood sugar (sweating, shaking, dizzy, confused) eat 15 grams of carbs and wait 15 minutes:    Glucose Tabs are best for sugars under 70 -  Dex4 or BD Glucose tablets are good, you will need to take 3-4 of these to equal 15 grams.     One small box of raisins  4 oz fruit juice box or   cup fruit juice  1 small apple  1 small banana    cup canned fruit in water    English muffin or a slice of bread with jelly   1 low fat frozen waffle with sugar-free syrup    cup cottage cheese with   cup frozen or fresh blueberries  1 cup skim or low-fat milk    cup whole grain cereal  4-6 crackers such as Triscuits      This medication is usually covered by insurance for patients with a diagnosis of Type 2 Diabetes. Depending on insurance  coverage, the medication may be changed to a different formulary, but they all work in the same way. Sometimes a prior authorization is required, which may take up to 1-2 weeks for an insurance company to make a decision if they will cover the medication. Please be patient, you will be notified either way.     Contact the nurse via GET IT Mobile or call [phone] if you have any questions or concerns. (Do not stop taking it if you don't think it's working. For some people it works without them knowing it.)     In order to get refills of this or any medication we prescribe you must be seen in the medical weight mgmt clinic every 2-4 months.

## 2022-09-29 NOTE — TELEPHONE ENCOUNTER
PA Initiation    Medication: PA Pending [facility]  Insurance Company: [employer] ([facility]) - Phone [phone] Fax [phone]  Pharmacy Filling the Rx:    Filling Pharmacy Phone:    Filling Pharmacy Fax:    Start Date: 9/29/2022    Key: [id number]

## 2022-09-29 NOTE — ASSESSMENT & PLAN NOTE
Weight stable 1.5 years s/p sleeve with sustained weight loss of 39% total body weight loss. Hunger and cravings well controlled. Significant improvement in NAM and DMII. BMI now 30 with weight at 298. Weight stable for the last several months. She would ideally like to try to get to a weight of 180. Would like to consider AOM to assist with this.     Continues to have limited hunger/ cravings. Distaste in food is still persistent. Adequate protein and hydration. Food is now fuel rather than pleasure. Question if there is a component of insulin resistance impacting weight loss with hx of DMII. Would like to see if GLP1 is covered. On victoza preop but quite expensive. We'll see what is covered now with semaglutide.       Plan  Start ozempic  Continue to monitor protein- okay to skip snacks/ protein shake if getting adequate  Follow up 3 months  Consider sooner follow up with RD if needed

## 2022-09-29 NOTE — NURSING NOTE
Chief Complaint   Patient presents with     Follow Up     Return weight management.         Vitals:    09/29/22 1112   Weight: 198 lb       Body mass index is 30.55 kg/m .      Jacobo Alfaro, EMT  Surgery Clinic

## 2022-10-04 NOTE — TELEPHONE ENCOUNTER
Prior Authorization Approval    Authorization Effective Date: 9/29/2022  Authorization Expiration Date: 9/29/2023  Medication: Ozempic  Approved Dose/Quantity:   Reference #: Key: [id number]   Insurance Company: Pay-Me ([facility]) - Phone [phone] Fax [phone]  Expected CoPay:       CoPay Card Available:      Foundation Assistance Needed:    Which Pharmacy is filling the prescription (Not needed for infusion/clinic administered):    Pharmacy Notified:    Patient Notified:

## 2023-01-29 ENCOUNTER — HEALTH MAINTENANCE LETTER (OUTPATIENT)
Age: 58
End: 2023-01-29

## 2023-04-13 NOTE — PROGRESS NOTES
"Stephanie Sanches is a 57 year old female who is being evaluated via a billable video visit.      The patient has been notified of following:     \"This video visit will be conducted via a call between you and your physician/provider. We have found that certain health care needs can be provided without the need for an in-person physical exam.  This service lets us provide the care you need with a video conversation.  If a prescription is necessary we can send it directly to your pharmacy.  If lab work is needed we can place an order for that and you can then stop by our lab to have the test done at a later time.    Video visits are billed at different rates depending on your insurance coverage.  Please reach out to your insurance provider with any questions.    If during the course of the call the physician/provider feels a video visit is not appropriate, you will not be charged for this service.\"    Patient has given verbal consent for Video visit? Yes  How would you like to obtain your AVS? MyChart  If you are dropped from the video visit, the video invite should be resent to: Text to cell phone: [phone]  Will anyone else be joining your video visit? No   {If patient encounters technical issues they should call [phone]      Video-Visit Details    Type of service:  Video Visit    Video Start Time: 10:35 AM   Video End Time: 10:56 AM    Originating Location (pt. Location): Home    Distant Location (provider location):  [facility] [place]     Platform used for Video Visit: [employer]    During this virtual visit the patient is located in [place], patient verifies this as the location during the entirety of this visit.     Nutrition Reassessment  Reason For Visit:  Stephanie Sanches is a 57 year old female presenting today for nutrition follow-up, 2 years s/p Sleeve Gastrectomy with Dr. Zambrano (4/5/2021).  Patient referred by Farida Seymour.    Patient with Co-morbidities of obesity " "including:  Type II DM yes   Renal Failure no  Sleep apnea no  Hypertension no   Dyslipidemia no  Joint pain no  Back pain no  GERD no      Pt's history is also significant for NAM, followed by GI     Anthropometrics:  Initial Consult Weight: 336 lbs  Day of Surgery Weight (4/5/21): 308.7 lbs  Current Weight:   Estimated body mass index is 30.09 kg/m  as calculated from the following:    Height as of an earlier encounter on 4/14/23: 1.715 m (5' 7.5\").    Weight as of an earlier encounter on 4/14/23: 88.5 kg (195 lb).  Weight loss: -141 lbs from initial, - 113 lbs from day of surgery    Pt reports feeling comfortable with where weight is at, wouldn't mind going down to 185 lbs.     Current Vitamins/Minerals:   MVI with Iron 18 mg BID (Equate children's chewable)  Gummy calcium 500 BID   ashu melts B12 1000 mcg once a day  Gummy D3 2000 international unit(s) once day  Ashu melt B1 12.5 mg once day  ashu probiotic 1 day  Did zinc repletion for 90 days, completed.   Benefiber gummy BID    Taking omeprazole in am (separate time from MVI with iron).        Nutrition History:  Continues regular bariatric diet. Food stills doesn't taste great, or like she remembers pre-SG.  Meals are 1/4-1/2 cup in size. Eating meals TID. Not always eating a snack, feeling fed most often between meals. Continues to drink protein shake daily.   Wearing a dexcom. Reports expericening low BG in the morning, while working second evening job and/or middle of the night. Corrects with juice or yogurt bites.   Working on increasing hydration, typically getting 40 oz clear liquids, plus 11 oz protein shake.     Diet Recall:  Breakfast: Oikos Greek yogurt (23 g pro) with protein granola and berries  Lunch: fruit/veggie smoothie   Snack: 1/4 cup of yogurt; sugar-free jello/pudding  Dinner: 6 pm chicken + fruit/vegetable; burger + egg + cheese; shrimp   Beverages: Protein shake (30 gm pro), water, coffee. Once monthly having Starbucks     Progress " with Previous Goals:  1) Consume 60 grams of protein/day. - Met, continues  2) Sip on 48-64 oz of fluids/day- between meals only. - She is working on improving  3) Eat slowly (>20 min/meal), chewing foods well (to applesauce-like consistency). - Met, continues   4) Continue current vitamin/mineral supplements - Met, continues   5) Increase physical activity as safely able - Met, getting 10,000-14,000 steps just while at second job.     Physical Activity: Works for [employer] 7 days per week, on her feet, getting in high level of steps.     Nutrition Prescription:  Grams Protein: 60 (minimum)   Amount of Fluid: 48-64 oz    Nutrition Diagnosis  Previous: Food and nutrition-related knowledge deficit r/t lack of prior exposure to diet instruction beyond 12 months s/p SG as evidenced by Pt seeking further guidance from RD on diet instruction beyond 12 months s/p SG. - resolved    Current: Inadequate oral intake (carbohydrates) r/t early satiety, bariatric food choices aeb episodes of hypoglycemia.     Intervention  Materials/Education provided, reviewed bariatric regular consistency diet, protein intake, fluid intake, eating pace, chewing foods well, portion control, sugar/fat intake, recommended vitamin/mineral supplements. Praised pt on the positive diet and lifestyle change she has maintained since surgery. Discussed incorporating an HS snack of carb and protein to help with night-time lows. Discussed example food sources. Provided pt with list of goals and resources below via Liligo.com.    Expected Engagement: good    Goals:  1) Add in a carb (grain/fruit/starch) + protein as evening snack.                 - Example half banana and peanut butter, a few whole grain crackers and part-skim cheese stick.                 - See list handout below for other carb/protein food ideas.  2) Increase fluid intake. Goal to consume 48-64 oz.day.   - Take a sip every 15-30 mins, between meals.       Keeping Up Your Diet after Weight  "Loss Surgery  [email or web address]      Tools for after surgery:  Moozey Dish Sets: bariatric meal size bowls and plates with built in measurement designs on the dishes    Bariatric Pal: https://store.roundCornerpal.Light Extraction/collections/bariatric-dinnerware    Books:  \"Fresh Start Bariatric Cookbook\" by Lauren Kessler, MS, RDN, CD - $13 - Excellent cookbook with post-op recipes and many other resources to check out for ongoing support after surgery    \"Eating Well After Weight Loss Surgery\" by Ericka Devine and Jesus Alberto Colby - $10 - Great cookbook with recipes for each diet stage after surgery and recommended portion sizes. Slightly more intensive cooking recipes.    \"Bariatric Mindset Success\" by Irene Mitchell - $14.24 on Amazon - book that helps with prevention of weight regain after surgery. Helps train your brain for long term success with weight loss after surgery.    Post-Bariatric Surgery Online Recipe Resources:  Online:    Simpler Recipes: https://www.LightSand Communications/bariatric-surgery/recipes    Wonton cups and muffin tin portion sized recipes: https://www.Symetis/blog/10-single-serving-meals-you-need-in-your-bariatric-life/    Some recipes on this site have larger than 1 cup portion size pictures: http://www.muschealth.org/weight-loss-surgery/nutrition/recipe-corner.html     https://www.Poderopedia.me/25-bariatric-friendly-weeknight-meals/    Crockpot recipes: https://www.Poderopedia.me/25-bariatric-friendly-crockpot-recipes/    https://Tongal.Light Extraction/recipes/     https://www.Cranberry Chic.Light Extraction/mbd-recipes         Follow-Up: 12 months or prn    Time spent with patient: 21 minutes.  Mireya Lou RD LD  "

## 2023-04-14 ENCOUNTER — VIRTUAL VISIT (OUTPATIENT)
Dept: ENDOCRINOLOGY | Facility: CLINIC | Age: 58
End: 2023-04-14
Payer: COMMERCIAL

## 2023-04-14 VITALS — HEIGHT: 68 IN | BODY MASS INDEX: 29.55 KG/M2 | WEIGHT: 195 LBS

## 2023-04-14 DIAGNOSIS — E11.8 DIABETES MELLITUS TYPE 2 WITH COMPLICATIONS (H): ICD-10-CM

## 2023-04-14 DIAGNOSIS — K75.81 NASH (NONALCOHOLIC STEATOHEPATITIS): ICD-10-CM

## 2023-04-14 DIAGNOSIS — E66.811 CLASS 1 OBESITY WITH SERIOUS COMORBIDITY AND BODY MASS INDEX (BMI) OF 30.0 TO 30.9 IN ADULT, UNSPECIFIED OBESITY TYPE: ICD-10-CM

## 2023-04-14 DIAGNOSIS — Z98.84 S/P LAPAROSCOPIC SLEEVE GASTRECTOMY: ICD-10-CM

## 2023-04-14 DIAGNOSIS — Z71.3 NUTRITIONAL COUNSELING: Primary | ICD-10-CM

## 2023-04-14 DIAGNOSIS — E66.811 CLASS 1 OBESITY WITH SERIOUS COMORBIDITY AND BODY MASS INDEX (BMI) OF 30.0 TO 30.9 IN ADULT, UNSPECIFIED OBESITY TYPE: Primary | ICD-10-CM

## 2023-04-14 PROCEDURE — 99214 OFFICE O/P EST MOD 30 MIN: CPT | Mod: VID | Performed by: NURSE PRACTITIONER

## 2023-04-14 PROCEDURE — 99207 PR NO CHARGE LOS: CPT | Mod: VID | Performed by: DIETITIAN, REGISTERED

## 2023-04-14 PROCEDURE — 97803 MED NUTRITION INDIV SUBSEQ: CPT | Mod: VID | Performed by: DIETITIAN, REGISTERED

## 2023-04-14 NOTE — PATIENT INSTRUCTIONS
"Thank you for allowing us the privilege of caring for you. We hope we provided you with the excellent service you deserve.   Please let us know if there is anything else we can do for you so that we can be sure you are completely satisfied with your care experience.    To ensure the quality of our services you may be receiving a patient satisfaction survey from an independent patient satisfaction monitoring company.    The greatest compliment you can give is a \"Likely to Recommend\"    Your visit was with Farida Seymour NP today.    Instructions per today's visit:     Rickey Sanches, it was great to visit with you today.  Here is a review of our visit.  If our clinic scheduler is not able to reach you please call [phone] to schedule your next appointments.      Plan:  Continue working on keeping skin dry. Can try pieces of cotton tshirt to provide a barrier  Try checking blood sugars when you have a low on dexcom to check accuracy.   Keep working on keeping busy to avoid stress eating   Great job working on stress management  Try consider podcast \"nothing much happens\"   follow up 6 months   Let me know if you want referral to plastic surgery     Information about Video Visits with [facility]: video visit information  _________________________________________________________________________________________________________________________________________________________  If you are asked by your clinic team to have your blood pressure checked:  [facility] do offer several locations for blood pressure checks. Please follow the below link to schedule an appointment. Scheduling an appointment at the pharmacy for a blood pressure check is now preferred.    Appointment Plus ([email or web address])  _________________________________________________________________________________________________________________________________________________________  Important contact and scheduling information:  Please " call our contact center at [phone] to schedule your next appointments.  To find a lab location near you, please call [phone].  For any nursing questions or concerns call Ynes Osborne LPN at [phone] or Ruth Arora RN at [phone]  Please call during clinic hours Monday through Friday 8:00a - 4:00p if you have questions or you can contact us via Styloolahart at anytime and we will reply during clinic hours.    Lab results will be communicated through My Chart or letter (if My Chart not used). Please call the clinic if you have not received communication after 1 week or if you have any questions.?  Clinic Fax: [phone]    _________________________________________________________________________________________________________________________________________________________  Meal Replacement Products:    Here is the link to our new e-store where you can purchase our meal replacement products    [facility] E-Store  [email or web address] [email or web address]/store    The one week starter kit is a great way to sample a variety of products and see what works for you.    If you want more information about the product go to: Fresh Sinobpo.Xylitol [place]    If you are an employee or [facility] or [facility] please contact your care team for a 10% estore discount    Free Shipping for orders over $75     Benefits of meal replacements products:    Portion and calorie control  Improved nutrition  Structured eating  Simplified food choices  Avoid contact with trigger foods  _________________________________________________________________________________________________________________________________________________________  Interested in working with a health ?  Health coaches work with you to improve your overall health and wellbeing.  They look at the whole person, and may involve discussion of different areas of life, including, but not limited to the four pillars of health  (sleep, exercise, nutrition, and stress management). Discuss with your care team if you would like to start working a health .  Health Coaching-3 Pack: Schedule by calling [phone]    $99 for three health coaching visits    Visits may be done in person or via phone    Coaching is a partnership between the  and the client; Coaches do not prescribe or diagnose    Coaching helps inspire the client to reach his/her personal goals   _________________________________________________________________________________________________________________________________________________________  24 Week Healthy Lifestyle Plan:    Our mission in the 24-week Healthy Lifestyle Plan is to provide you with individualized care by giving you the tools, education and support you need to lose weight and maintain a healthy lifestyle. In your 24-week journey, you ll be supported by a dedicated weight loss team that includes registered dietitians, medical weight management providers, health coaches, and nurses -- all with special expertise in weight loss -- to help you every step of the way.     Monthly meetings with your registered dietician or medical weight management provider help to review your progress, update your care plan, and make any adjustments needed to ensure success. Between these visits, weekly and bi-weekly health  visits will help you focus on the four pillars of weight loss -- stress, sleep, nutrition, and exercise -- and how you can best adapt each to achieve sustainable weight loss results.    In addition, you will be given exclusive access to online wellbeing classes through My Team Zone.  Your initial visit will be with a medical weight management provider who will help to understand your weight loss goals and ensure this program is the right fit for you. Please let our team know if you are interested in the 24 week plan by sending a message to your care team or calling [phone] to  schedule.  _________________________________________________________________________________________________________________________________________________________  __________  [facility] Get Moving Program  Our team of physical therapists is trained to help you understand and take control of your condition. They will perform a thorough evaluation to determine your ability for activity and develop a customized plan to fit your goals and physical ability.  Scheduling: Unsure if the Get Moving program is right for you? Discuss the program with your medical provider or diabetes educator. You can also call us at [phone] to ask questions or schedule an appointment.   [facility] Get Moving Program  ____________________________________________________________________________________________________________________________________________________________________________   [facility] Diabetes Prevention Program (DPP)  If you have prediabetes and Medicare please contact us via BloomNationt to learn more about the Diabetes Prevention Program (DPP)  Program Details:   [facility] offers the year-long Diabetes Prevention Program (DPP). The program helps you to make lifestyle changes that prevent or delay type 2 diabetes by supporting healthy eating, increased physical activity, stress reduction and use of coping skills.   On average, previous [facility] DPP cohorts have lost and maintained at least 5% of their starting weight throughout the program and averaged more than 150 minutes of physical activity per week.  Participants meet weekly for one-hour group sessions over sixteen weeks, every other week for the next 8 weeks, and monthly for the last six months.   A year-long maintenance program is also available for participants who complete the first year.   Location & Cost:   During the COVID-19 Public Health Emergency, the program is offered virtually. When in-person classes can resume, they  will be held at [facility].  For people with Medicare, the program is covered in full. A self-pay option will also be available for those with non-Medicare insurance plans.   ______________________________________________________________________________________________________________________________________________________________________________________________________________________________    To work with a Behavioral Health Psychologist:    Call to schedule:    Edmundo Abraham - [phone]  Yohan Moreno - [phone]  Hedy Jones - [phone]  Aileen Grant - [phone]   Rachel Suarez PhD (cannot accept Medicare) [phone]        Thank you,   [facility] Comprehensive Weight Management Team

## 2023-04-14 NOTE — ASSESSMENT & PLAN NOTE
"Did not start ozempic, we had discussed starting because she was thinking she wanted to lose some more weight. Was researching side effects and hadn't decided if she was going to take it. Then, realized she was still losing weight some so he held off. Feeling comfortable where she was now, would be thrilled to lose additional 10lb but content here.     Working hard to avoid emotional eating/ stress eating. Lots of stress in the last 3 months. Using mindfulness and distraction.     Plan:  Continue working on keeping skin dry. Can try pieces of cotton tshirt to provide a barrier  Try checking blood sugars when you have a low on dexcom to check accuracy.   Keep working on keeping busy to avoid stress eating   Great job working on stress management  Try consider podcast \"nothing much happens\"   follow up 6 months   Let me know if you want referral to plastic surgery   "

## 2023-04-14 NOTE — PROGRESS NOTES
"Return Bariatric Surgery Note    RE: Stephanie Sanches  MR#: [id number]  : 1965  VISIT DATE: 2023      Dear Benson Allen,    I had the pleasure of seeing your patient, Stephanie Sanches, in my post-bariatric surgery assessment clinic.    Assessment & Plan   Problem List Items Addressed This Visit        Digestive    Class 1 obesity with serious comorbidity and body mass index (BMI) of 30.0 to 30.9 in adult - Primary     Did not start ozempic, we had discussed starting because she was thinking she wanted to lose some more weight. Was researching side effects and hadn't decided if she was going to take it. Then, realized she was still losing weight some so he held off. Feeling comfortable where she was now, would be thrilled to lose additional 10lb but content here.     Working hard to avoid emotional eating/ stress eating. Lots of stress in the last 3 months. Using mindfulness and distraction.     Plan:  Continue working on keeping skin dry. Can try pieces of cotton tshirt to provide a barrier  Try checking blood sugars when you have a low on dexcom to check accuracy.   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 2 years s/p sleeve .    HISTORY OF PRESENT ILLNESS:      2023     9:20 AM   Questions Regarding Prior Weight Loss Surgery Reviewed With Patient   I had the following weight loss procedure Sleeve Gastrectomy   What year was your surgery?    How has your " weight changed since your last visit? I have lost weight   Do you currently have any of the following Diabetes    Heartburn, acid reflux, or GERD (acid reflux disease)?   Do you have any concerns today? Weight loss med question.     Last seen 9/2022 - wanting to discuss AOM for additional weight loss - was at 39% TBWL but was wanting to lose additional weight. Struggling with distaste in food, looking at food as fuel rather than pleasure.     Still losing weight. Not worried about losing additional weight. Feels comfortable at 195- weight stable recently     Some stress eating/ emotional eating - working on avoiding this with distraction and alternative activities.     Anti-obesity medications:     Current:   ozempic approved 9/29/2022 - per hepatology note 2/2023- had not started yet. Still not taking- worried about side effects.     Recent diet changes:   Still has food not tasting correct     Reflux- some increase recently - doing tums as needed - maybe once every other week   -1/2 caf coffee  -no carbonation   -alcohol  -no nsaids   -miralax and benefiber for constipation     -Protein- adequate   -Water- always thinking about managing this     Recent exercise/activity changes: walking with the dogs, excited for warmer weather     Recent stressors:   Lost uncle who helped raise her- lots of grief, has reached out to [facility]   2 dogs had surgery since 1/2023   Working 2 full time jobs   Responsible for mother and aunt     Trying to focus on positive things and take time to read good stuff   Finding family that are supportive and friends supportive     Recent sleep changes: never a good sleeper- working on improving this - leaving phone in another room, prepping room for bed, relaxing before bed     Vitamins/Labs: done throughout the year     Got dexcom - sugars between 50 and 200, more  Fluctuations when stress increased   - lows often after breakfast (protein yogurt, protein granola, berries)   -lows in the evenings  while at work     Weight History:      4/7/2023     9:20 AM   --   What is your highest lifetime weight? 385   What is your lowest weight since surgery? (In pounds) 194     Initial Weight (lbs): 326 lbs  Weight: 88.5 kg (195 lb) (pt reported)  Last Visits Weight: 89.8 kg (198 lb)  Cumulative weight loss (lbs): 131  Weight Loss Percentage: 40.18%        4/7/2023     9:20 AM   Questions Regarding Co-Morbidities and Health Concerns Reviewed With Patient   Pre-diabetes Improved   Diabetes II Improved   What was your most recent hemoglobin a1c 6.1   How many years have you had diabetes? 10   High Blood Pressure Never   High cholesterol Never   Heartburn/Reflux Improved   Sleep apnea Never   PCOS Never   Back pain Stayed the same   Joint pain Improved   Lower leg swelling Improved           4/7/2023     9:20 AM   Eating Habits   How many meals do you eat per day? 3   Do you snack between meals? Sometimes   How much food are you eating at each meal? 1/2 cup to 1 cup   Are you able to separate your meals and liquids by at least 30 minutes? Yes   Are you able to avoid liquid calories? Yes           4/7/2023     9:20 AM   Exercise Questions Reviewed With Patient   How often do you exercise? 3 to 4 times per week   What is the duration of your exercise (in minutes)? 20 Minutes   What types of exercise do you do? walking    weightlifting   What keeps you from being more active? I am as active as I can possbily be       Social History:      4/9/2022     7:12 AM   --   Are you smoking? No   Are you drinking alcohol? No       Medications:  Current Outpatient Medications   Medication     calcium citrate-vitamin D (CITRACAL) 315-200 MG-UNIT TABS per tablet     Cetaphil Moisturizing (CETAPHIL) external lotion     cholecalciferol 25 MCG (1000 UT) CHEW     multivitamin w/minerals (THERA-VIT-M) tablet     nystatin (MYCOSTATIN) 524868 UNIT/GM external powder     polyethylene glycol (MIRALAX) 17 GM/Dose powder     Probiotic Product  "(PROBIOTIC-10 PO)     Thiamine HCl (B-1 PO)     No current facility-administered medications for this visit.         4/9/2022     7:12 AM   --   Do you avoid NSAIDs such as (Ibuprofen, Aleve, Naproxen, Advil)?   Yes       ROS:  GI:       4/7/2023     9:20 AM   --   Stress urinary incontinence No     Skin:       4/9/2022     7:12 AM   BAR RBS ROS - SKIN   Rash in skin folds: Yes      Rashes in skin folds are not improving, still using nystatin but not as helpful, using baby powder and baking soda now. wrose in abdominal panus and belly button     Having neck pain from excess skin from arms and chest pulling       Psych:       4/9/2022     7:12 AM   --   Depression: No   Anxiety: No     Female Only:       4/7/2023     9:20 AM   BAR RBS ROS - FEMALE ONLY   Female only None of the above       Transferred Records on 05/11/2021   Component Date Value Ref Range Status     TSH 05/11/2021 3.02  0.40 - 3.99 uIU/mL Final     Cholesterol 05/11/2021 164  114 - 200 mg/dL Final     Triglycerides 05/11/2021 133  10 - 200 mg/dL Final     HDL Cholesterol 05/11/2021 50  40 - 60 mg/dL Final     LDL Cholesterol Calculated 05/11/2021 87  <100 mg/dL Final     Potassium 05/11/2021 3.9  3.4 - 5.1 mEq/L Final     Creatinine 05/11/2021 0.78  0.40 - 1.00 mg/dL Final     GFR Estimate 05/11/2021 >60  >60 ml/min/1.73m2 Final     Glucose 05/11/2021 126 (A)  70 - 99 mg/dL Final     AST 05/11/2021 22  10 - 40 IU/L Final     ALT 05/11/2021 19  6 - 31 IU/L Final     Hemoglobin A1C 05/11/2021 5.8 (A)  4.0 - 5.6 % Final         PHYSICAL EXAM:  Objective    Ht 1.715 m (5' 7.5\")   Wt 88.5 kg (195 lb)   BMI 30.09 kg/m           Vitals:  No vitals were obtained today due to virtual visit.    Physical Exam   GENERAL: Healthy, alert and no distress  EYES: Eyes grossly normal to inspection.  No discharge or erythema, or obvious scleral/conjunctival abnormalities.  RESP: No audible wheeze, cough, or visible cyanosis.  No visible retractions or increased work " of breathing.    SKIN: Visible skin clear. No significant rash, abnormal pigmentation or lesions.  NEURO: Cranial nerves grossly intact.  Mentation and speech appropriate for age.  PSYCH: Mentation appears normal, affect normal/bright, judgement and insight intact, normal speech and appearance well-groomed.        Sincerely,    Farida Seymour, NP

## 2023-04-14 NOTE — LETTER
"4/14/2023       RE: Stephanie Sanches  [address] [address]     Dear Colleague,    Thank you for referring your patient, Stephanie Sanches, to the [facility] [place] at [facility]. Please see a copy of my visit note below.    Stephanie Sanches is a 57 year old female who is being evaluated via a billable video visit.      The patient has been notified of following:     \"This video visit will be conducted via a call between you and your physician/provider. We have found that certain health care needs can be provided without the need for an in-person physical exam.  This service lets us provide the care you need with a video conversation.  If a prescription is necessary we can send it directly to your pharmacy.  If lab work is needed we can place an order for that and you can then stop by our lab to have the test done at a later time.    Video visits are billed at different rates depending on your insurance coverage.  Please reach out to your insurance provider with any questions.    If during the course of the call the physician/provider feels a video visit is not appropriate, you will not be charged for this service.\"    Patient has given verbal consent for Video visit? Yes  How would you like to obtain your AVS? MyChart  If you are dropped from the video visit, the video invite should be resent to: Text to cell phone: [phone]  Will anyone else be joining your video visit? No   {If patient encounters technical issues they should call [phone]      Video-Visit Details    Type of service:  Video Visit    Video Start Time: 10:35 AM   Video End Time: 10:56 AM    Originating Location (pt. Location): Home    Distant Location (provider location):  [facility] [place]     Platform used for Video Visit: Chuguobang    During this virtual visit the patient is located in [place], patient " "verifies this as the location during the entirety of this visit.     Nutrition Reassessment  Reason For Visit:  Stephanie Sanches is a 57 year old female presenting today for nutrition follow-up, 2 years s/p Sleeve Gastrectomy with Dr. Zambrano (4/5/2021).  Patient referred by Farida Seymour.    Patient with Co-morbidities of obesity including:  Type II DM yes   Renal Failure no  Sleep apnea no  Hypertension no   Dyslipidemia no  Joint pain no  Back pain no  GERD no      Pt's history is also significant for NAM, followed by GI     Anthropometrics:  Initial Consult Weight: 336 lbs  Day of Surgery Weight (4/5/21): 308.7 lbs  Current Weight:   Estimated body mass index is 30.09 kg/m  as calculated from the following:    Height as of an earlier encounter on 4/14/23: 1.715 m (5' 7.5\").    Weight as of an earlier encounter on 4/14/23: 88.5 kg (195 lb).  Weight loss: -141 lbs from initial, - 113 lbs from day of surgery    Pt reports feeling comfortable with where weight is at, wouldn't mind going down to 185 lbs.     Current Vitamins/Minerals:   MVI with Iron 18 mg BID (Equate children's chewable)  Gummy calcium 500 BID   ashu melts B12 1000 mcg once a day  Gummy D3 2000 international unit(s) once day  Ashu melt B1 12.5 mg once day  ashu probiotic 1 day  Did zinc repletion for 90 days, completed.   Benefiber gummy BID    Taking omeprazole in am (separate time from MVI with iron).        Nutrition History:  Continues regular bariatric diet. Food stills doesn't taste great, or like she remembers pre-SG.  Meals are 1/4-1/2 cup in size. Eating meals TID. Not always eating a snack, feeling fed most often between meals. Continues to drink protein shake daily.   Wearing a dexcom. Reports expericening low BG in the morning, while working second evening job and/or middle of the night. Corrects with juice or yogurt bites.   Working on increasing hydration, typically getting 40 oz clear liquids, plus 11 oz protein shake.     Diet " Recall:  Breakfast: Oikos Greek yogurt (23 g pro) with protein granola and berries  Lunch: fruit/veggie smoothie   Snack: 1/4 cup of yogurt; sugar-free jello/pudding  Dinner: 6 pm chicken + fruit/vegetable; burger + egg + cheese; shrimp   Beverages: Protein shake (30 gm pro), water, coffee. Once monthly having [employer]     Progress with Previous Goals:  1) Consume 60 grams of protein/day. - Met, continues  2) Sip on 48-64 oz of fluids/day- between meals only. - She is working on improving  3) Eat slowly (>20 min/meal), chewing foods well (to applesauce-like consistency). - Met, continues   4) Continue current vitamin/mineral supplements - Met, continues   5) Increase physical activity as safely able - Met, getting 10,000-14,000 steps just while at second job.     Physical Activity: Works for [employer] 7 days per week, on her feet, getting in high level of steps.     Nutrition Prescription:  Grams Protein: 60 (minimum)   Amount of Fluid: 48-64 oz    Nutrition Diagnosis  Previous: Food and nutrition-related knowledge deficit r/t lack of prior exposure to diet instruction beyond 12 months s/p SG as evidenced by Pt seeking further guidance from RD on diet instruction beyond 12 months s/p SG. - resolved    Current: Inadequate oral intake (carbohydrates) r/t early satiety, bariatric food choices aeb episodes of hypoglycemia.     Intervention  Materials/Education provided, reviewed bariatric regular consistency diet, protein intake, fluid intake, eating pace, chewing foods well, portion control, sugar/fat intake, recommended vitamin/mineral supplements. Praised pt on the positive diet and lifestyle change she has maintained since surgery. Discussed incorporating an HS snack of carb and protein to help with night-time lows. Discussed example food sources. Provided pt with list of goals and resources below via [employer].    Expected Engagement: good    Goals:  1) Add in a carb (grain/fruit/starch) + protein as evening  "snack.                 - Example half banana and peanut butter, a few whole grain crackers and part-skim cheese stick.                 - See list handout below for other carb/protein food ideas.  2) Increase fluid intake. Goal to consume 48-64 oz.day.   - Take a sip every 15-30 mins, between meals.       Keeping Up Your Diet after Weight Loss Surgery  [email or web address]      Tools for after surgery:  Assurz Dish Sets: bariatric meal size bowls and plates with built in measurement designs on the dishes    Bariatric Pal: https://store.SuperTruper.Storrz/collections/bariatric-dinnerware    Books:  \"Fresh Start Bariatric Cookbook\" by Lauren Kessler, MS, RDN, CD - $13 - Excellent cookbook with post-op recipes and many other resources to check out for ongoing support after surgery    \"Eating Well After Weight Loss Surgery\" by Ericka Devine and Jesus Alberto Colby - $10 - Great cookbook with recipes for each diet stage after surgery and recommended portion sizes. Slightly more intensive cooking recipes.    \"Bariatric Mindset Success\" by Irene Mitchell - $14.24 on Amazon - book that helps with prevention of weight regain after surgery. Helps train your brain for long term success with weight loss after surgery.    Post-Bariatric Surgery Online Recipe Resources:  Online:  Simpler Recipes: https://www.Qwite/bariatric-surgery/recipes  Wonton cups and muffin tin portion sized recipes: https://www.FirstString.Storrz/blog/10-single-serving-meals-you-need-in-your-bariatric-life/  Some recipes on this site have larger than 1 cup portion size pictures: http://www.muschealth.org/weight-loss-surgery/nutrition/recipe-corner.html   https://www.[employer].me/25-bariatric-friendly-weeknight-meals/  Crockpot recipes: https://www.foodCliQr Technologies.me/25-bariatric-friendly-crockpot-recipes/  https://TrustedAd.Storrz/recipes/   https://www.Pixeon/mbd-recipes         Follow-Up: 12 months or prn    Time spent with " patient: 21 minutes.  Mireya Lou RD LD

## 2023-04-14 NOTE — PROGRESS NOTES
Stephanie Sanches is a 57 year old who is being evaluated via a billable video visit.      How would you like to obtain your AVS? MyChart  If the video visit is dropped, the invitation should be resent by: Text to cell phone: [phone]  Will anyone else be joining your video visit? No  If patient encounters technical issues they should call [phone]    During this virtual visit the patient is located in [place], patient verifies this as the location during the entirety of this visit.     Video-Visit Details  Video Start Time: 1001    Type of service:  Video Visit    Video End Time:1028    Originating Location (pt. Location): Home  Distant Location (provider location):  [facility] [place]  Platform used for Video Visit: Danelle Long CMA  4/14/2023      9:28 AM

## 2023-04-14 NOTE — NURSING NOTE
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)     Diarrhea, functional     Elevated liver function tests     Family history of ovarian cancer     Hyperlipidemia     Irritable bowel syndrome     NAM (nonalcoholic steatohepatitis)     Osteoarthrosis involving lower leg     Vitamin D deficiency     S/P laparoscopic sleeve gastrectomy    )  (   Current Outpatient Medications   Medication Sig Dispense Refill     calcium citrate-vitamin D (CITRACAL) 315-200 MG-UNIT TABS per tablet Take 1 tablet by mouth 2 times daily       Cetaphil Moisturizing (CETAPHIL) external lotion Apply topically as needed       cholecalciferol 25 MCG (1000 UT) CHEW        multivitamin w/minerals (THERA-VIT-M) tablet Take 1 tablet by mouth 2 times daily       nystatin (MYCOSTATIN) 225722 UNIT/GM external powder Apply topically 2 times daily as needed (rashes in skin folds) 60 g 1     polyethylene glycol (MIRALAX) 17 GM/Dose powder Take 1 packet by mouth       Probiotic Product (PROBIOTIC-10 PO) Take 1 capsule by mouth every morning        semaglutide (OZEMPIC) 2 MG/1.5ML SOPN pen Inject 0.25 mg subcutaneously once weekly for 4 weeks then 0.5 mg once weekly. 1.5 mL 2     Thiamine HCl (B-1 PO)       )  ( Diabetes Eval:    )    ( Pain Eval:  Data Unavailable )    ( Wound Eval:       )    (   History   Smoking Status     Never   Smokeless Tobacco     Never    )    ( Signed By:  Mariana Long; April 14, 2023; 9:28 AM )    "

## 2023-04-14 NOTE — LETTER
"2023       RE: Stephanie Sanches  [address] [address]     Dear Colleague,    Thank you for referring your patient, Stephanie Sanches, to the [facility]NAG[facility] [place] at [facility]. Please see a copy of my visit note below.    Return Bariatric Surgery Note    RE: Stephanie Sanches  MR#: [id number]  : 1965  VISIT DATE: 2023      Dear Benson Allen,    I had the pleasure of seeing your patient, Stephanie Sanches, in my post-bariatric surgery assessment clinic.    Assessment & Plan   Problem List Items Addressed This Visit          Digestive    Class 1 obesity with serious comorbidity and body mass index (BMI) of 30.0 to 30.9 in adult - Primary     Did not start ozempic, we had discussed starting because she was thinking she wanted to lose some more weight. Was researching side effects and hadn't decided if she was going to take it. Then, realized she was still losing weight some so he held off. Feeling comfortable where she was now, would be thrilled to lose additional 10lb but content here.     Working hard to avoid emotional eating/ stress eating. Lots of stress in the last 3 months. Using mindfulness and distraction.     Plan:  Continue working on keeping skin dry. Can try pieces of cotton tshirt to provide a barrier  Try checking blood sugars when you have a low on dexcom to check accuracy.   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 2 years s/p sleeve .    HISTORY OF PRESENT ILLNESS:      4/7/2023     9:20 AM   Questions Regarding Prior Weight Loss Surgery Reviewed With Patient   I had the following weight loss procedure Sleeve Gastrectomy   What year was your surgery? 2021   How has your weight changed since your last visit? I have lost weight   Do you currently have any of the following Diabetes    Heartburn, acid reflux, or GERD (acid reflux disease)?   Do you have any concerns today? Weight loss med question.     Last seen 9/2022 - wanting to discuss AOM for additional weight loss - was at 39% TBWL but was wanting to lose additional weight. Struggling with distaste in food, looking at food as fuel rather than pleasure.     Still losing weight. Not worried about losing additional weight. Feels comfortable at 195- weight stable recently     Some stress eating/ emotional eating - working on avoiding this with distraction and alternative activities.     Anti-obesity medications:     Current:   ozempic approved 9/29/2022 - per hepatology note 2/2023- had not started yet. Still not taking- worried about side effects.     Recent diet changes:   Still has food not tasting correct     Reflux- some increase recently - doing tums as needed - maybe once every other week   -1/2 caf coffee  -no carbonation   -alcohol  -no nsaids   -miralax and benefiber for constipation     -Protein- adequate   -Water- always thinking about managing this     Recent exercise/activity changes: walking with the dogs, excited for warmer weather     Recent stressors:   Lost uncle who helped raise her- lots of grief, has reached out to EA   2 dogs had surgery since 1/2023   Working 2 full time jobs   Responsible for mother and aunt     Trying to focus on positive things and take time to read good stuff   Finding family that are supportive and friends supportive     Recent sleep changes: never a good sleeper-  working on improving this - leaving phone in another room, prepping room for bed, relaxing before bed     Vitamins/Labs: done throughout the year     Got dexcom - sugars between 50 and 200, more  Fluctuations when stress increased   - lows often after breakfast (protein yogurt, protein granola, berries)   -lows in the evenings while at work     Weight History:      4/7/2023     9:20 AM   --   What is your highest lifetime weight? 385   What is your lowest weight since surgery? (In pounds) 194     Initial Weight (lbs): 326 lbs  Weight: 88.5 kg (195 lb) (pt reported)  Last Visits Weight: 89.8 kg (198 lb)  Cumulative weight loss (lbs): 131  Weight Loss Percentage: 40.18%        4/7/2023     9:20 AM   Questions Regarding Co-Morbidities and Health Concerns Reviewed With Patient   Pre-diabetes Improved   Diabetes II Improved   What was your most recent hemoglobin a1c 6.1   How many years have you had diabetes? 10   High Blood Pressure Never   High cholesterol Never   Heartburn/Reflux Improved   Sleep apnea Never   PCOS Never   Back pain Stayed the same   Joint pain Improved   Lower leg swelling Improved           4/7/2023     9:20 AM   Eating Habits   How many meals do you eat per day? 3   Do you snack between meals? Sometimes   How much food are you eating at each meal? 1/2 cup to 1 cup   Are you able to separate your meals and liquids by at least 30 minutes? Yes   Are you able to avoid liquid calories? Yes           4/7/2023     9:20 AM   Exercise Questions Reviewed With Patient   How often do you exercise? 3 to 4 times per week   What is the duration of your exercise (in minutes)? 20 Minutes   What types of exercise do you do? walking    weightlifting   What keeps you from being more active? I am as active as I can possbily be       Social History:      4/9/2022     7:12 AM   --   Are you smoking? No   Are you drinking alcohol? No       Medications:  Current Outpatient Medications   Medication    calcium  "citrate-vitamin D (CITRACAL) 315-200 MG-UNIT TABS per tablet    Cetaphil Moisturizing (CETAPHIL) external lotion    cholecalciferol 25 MCG (1000 UT) CHEW    multivitamin w/minerals (THERA-VIT-M) tablet    nystatin (MYCOSTATIN) 888004 UNIT/GM external powder    polyethylene glycol (MIRALAX) 17 GM/Dose powder    Probiotic Product (PROBIOTIC-10 PO)    Thiamine HCl (B-1 PO)     No current facility-administered medications for this visit.         4/9/2022     7:12 AM   --   Do you avoid NSAIDs such as (Ibuprofen, Aleve, Naproxen, Advil)?   Yes       ROS:  GI:       4/7/2023     9:20 AM   --   Stress urinary incontinence No     Skin:       4/9/2022     7:12 AM   BAR RBS ROS - SKIN   Rash in skin folds: Yes      Rashes in skin folds are not improving, still using nystatin but not as helpful, using baby powder and baking soda now. wrose in abdominal panus and belly button     Having neck pain from excess skin from arms and chest pulling       Psych:       4/9/2022     7:12 AM   --   Depression: No   Anxiety: No     Female Only:       4/7/2023     9:20 AM   BAR RBS ROS - FEMALE ONLY   Female only None of the above       Transferred Records on 05/11/2021   Component Date Value Ref Range Status    TSH 05/11/2021 3.02  0.40 - 3.99 uIU/mL Final    Cholesterol 05/11/2021 164  114 - 200 mg/dL Final    Triglycerides 05/11/2021 133  10 - 200 mg/dL Final    HDL Cholesterol 05/11/2021 50  40 - 60 mg/dL Final    LDL Cholesterol Calculated 05/11/2021 87  <100 mg/dL Final    Potassium 05/11/2021 3.9  3.4 - 5.1 mEq/L Final    Creatinine 05/11/2021 0.78  0.40 - 1.00 mg/dL Final    GFR Estimate 05/11/2021 >60  >60 ml/min/1.73m2 Final    Glucose 05/11/2021 126 (A)  70 - 99 mg/dL Final    AST 05/11/2021 22  10 - 40 IU/L Final    ALT 05/11/2021 19  6 - 31 IU/L Final    Hemoglobin A1C 05/11/2021 5.8 (A)  4.0 - 5.6 % Final         PHYSICAL EXAM:  Objective    Ht 1.715 m (5' 7.5\")   Wt 88.5 kg (195 lb)   BMI 30.09 kg/m           Vitals:  No " vitals were obtained today due to virtual visit.    Physical Exam   GENERAL: Healthy, alert and no distress  EYES: Eyes grossly normal to inspection.  No discharge or erythema, or obvious scleral/conjunctival abnormalities.  RESP: No audible wheeze, cough, or visible cyanosis.  No visible retractions or increased work of breathing.    SKIN: Visible skin clear. No significant rash, abnormal pigmentation or lesions.  NEURO: Cranial nerves grossly intact.  Mentation and speech appropriate for age.  PSYCH: Mentation appears normal, affect normal/bright, judgement and insight intact, normal speech and appearance well-groomed.        Sincerely,    Farida Seymour NP      Stephanie Sanches is a 57 year old who is being evaluated via a billable video visit.      How would you like to obtain your AVS? MyChart  If the video visit is dropped, the invitation should be resent by: Text to cell phone: [phone]  Will anyone else be joining your video visit? No  If patient encounters technical issues they should call [phone]    During this virtual visit the patient is located in [place], patient verifies this as the location during the entirety of this visit.     Video-Visit Details  Video Start Time: 1001    Type of service:  Video Visit    Video End Time:1028    Originating Location (pt. Location): Home  Distant Location (provider location):  [facility]  Platform used for Video Visit: Danelle Long CMA  4/14/2023      9:28 AM

## 2023-04-14 NOTE — PATIENT INSTRUCTIONS
"Hi Lorena,    Follow-up with RD in 12 months, or as needed.     Thank you,    Mireya Lou, RD, LD  If you would like to schedule or reschedule an appointment with the RD, please call [phone]    Nutrition Goals  1) Add in a carb (grain/fruit/starch) + protein as evening snack.                 - Example half banana and peanut butter, a few whole grain crackers and part-skim cheese stick.                 - See list handout below for other carb/protein food ideas.  2) Increase fluid intake. Goal to consume 48-64 oz.day.   - Take a sip every 15-30 mins, between meals.       Keeping Up Your Diet after Weight Loss Surgery  [email or web address]      Tools for after surgery:  ReadWave Dish Sets: bariatric meal size bowls and plates with built in measurement designs on the dishes    Bariatric Pal: https://store.[employer].Respectance/collections/bariatric-dinnerware    Books:  \"Fresh Start Bariatric Cookbook\" by Lauren Kessler, MS, RDN, CD - $13 - Excellent cookbook with post-op recipes and many other resources to check out for ongoing support after surgery    \"Eating Well After Weight Loss Surgery\" by Ericka Devine and Jesus Alberto Colby - $10 - Great cookbook with recipes for each diet stage after surgery and recommended portion sizes. Slightly more intensive cooking recipes.    \"Bariatric Mindset Success\" by Irene Mitchell - $14.24 on Amazon - book that helps with prevention of weight regain after surgery. Helps train your brain for long term success with weight loss after surgery.    Post-Bariatric Surgery Online Recipe Resources:  Online:  Simpler Recipes: https://www.2[place]/bariatric-surgery/recipes  [place] cups and muffin tin portion sized recipes: https://www.[employer].Respectance/blog/10-single-serving-meals-you-need-in-your-bariatric-life/  Some recipes on this site have larger than 1 cup portion size pictures: http://www.[facility].org/weight-loss-surgery/nutrition/recipe-corner.html "   https://www.Funium.me/25-bariatric-friendly-weeknight-meals/  Crockpot recipes: https://www.Funium.me/25-bariatric-friendly-crockpot-recipes/  https://[employer]/recipes/   https://www.PeekYou/mbd-recipes          COMPREHENSIVE WEIGHT MANAGEMENT PROGRAM  VIRTUAL SUPPORT GROUPS    For Support Group Information:      We offer support groups for patients who are working on weight loss and considering, preparing for or have had weight loss surgery.   There is no cost for this opportunity.  You are invited to attend the?Virtual Support Groups?provided by any of the following locations:    [facility] via Microsoft Teams with Dianne Palma RN  2.   [place] via DataVote with Michael Carias, PhD, LP  3.   [place] via DataVote with Martha Capone RN  4.   [facility] via Microsoft Teams with Martha Coates [facility]    The following Support Group information can also be found on our website:  https://www.[facility].org/treatments/weight-loss-surgery-support-groups    https://www.[facility].org/treatments/weight-loss-and-weight-loss-surgery-support-groups    [facility] Weight Loss Surgery Support Group    [facility] Weight Loss Surgery Support Group  The support group is a patient-lead forum that meets monthly to share experiences, encouragement and education. It is open to those who have had weight loss surgery, are scheduled for surgery, or are considering surgery.   WHEN: This group meets on the 3rd Wednesday of each month from 5:00PM - 6:00PM virtually using Microsoft Teams.   FACILITATOR: Led by Dianne Dias, RD, LD, RN, the program's Clinical Coordinator.   TO REGISTER: Please contact the clinic via fluid Operations or call the nurse line directly at [phone] to inform our staff that you would like an invite sent to you and to let us know the email you would like the invite sent to. Prior to the meeting, a link with directions  "on how to join the meeting will be sent to you.    2023 Meetings (speakers to be determined)  January 18: \"Let's Talk\" a time for the group to share.  February 15: \"Let's Talk\" a time for the group to share.  March 15: \"Let's Talk\" a time for the group to share.  April 19: \"Let's Talk\" a time for the group to share.  May 17: \"Let's Talk\" a time for the group to share.  June 21: \"Let's Talk\" a time for the group to share.    [facility] and [facility] Support Groups    Connections Bariatric Care Support Group?  This is open to all pre- and post- operative bariatric surgery patients as well as their support system.   WHEN: This group meets the 2nd Tuesday of each month from 6:30 PM - 8:00 PM virtually using Microsoft Teams.   FACILITATOR: Led by Michael Carias, Ph.D who is a Licensed Psychologist with the [facility] Comprehensive Weight Management Program.   TO REGISTER: Please send an email to Michael Carias, Ph.D., LP at?[email or web address]?if you would like an invitation to the group and to learn about using Microsoft Teams.    2023 Meetings  January 10: Bear Viiera, PharmD, Pharmacy Resident, \"Medications and Bariatric Surgery\"  February 14:  March 14:  April 11:  May 9:  June 11:    Connections Post-Operative Bariatric Surgery Support Group  This is a support group for [facility] bariatric patients (and those external to [facility]) who have had bariatric surgery and are at least 3 months post-surgery.  WHEN: This support group meets the 4th Wednesday of the month from 11:00 AM - 12:00 PM virtually using Microsoft Teams.   FACILITATOR: Led by Certified Bariatric Nurse, Martha Capone RN.   TO REGISTER: Please send an email to Martha at [email or web address] if you would like an invitation to the group and to learn about using Microsoft Teams.    2023 Meetings  January 25  February 22  March 22  April 26  May 24  Gianna 28      [facility] " "of [facility] Healthy Lifestyle Virtual Support Group    Healthy Lifestyle Virtual Support Group?  This is 60 minutes of small group guided discussion, support and resources. All are welcome who want a healthy lifestyle.  WHEN: This group meets monthly on a Friday from 12:30 PM - 1:30 PM virtually using Microsoft Teams.   FACILITATOR: Led by National Board Certified Health and , Martha Coates [facility].   TO REGISTER: Please send an email to Martha [email or web address] to receive monthly invites to the group or if you have any questions about having a health .  Prior to the meeting, a link with directions on how to join the meeting will be sent to you.    2023 Meetings  January 20: \"Let's Talk\" a time for the group to share  February 17: Guest Speaker, Nisa Leon RD, Registered Dietician, \"Tips to Maximize Your Metabolism\"  March 17: Let's Talk\" a time for the group to share  April 14: Guest Speaker, Naida Gay RD, Registered Dietician, \"Heart Health\"  May 19: \"Let's Talk\" a time for the group to share  June: To be announced.            "

## 2023-04-19 ENCOUNTER — TELEPHONE (OUTPATIENT)
Dept: ENDOCRINOLOGY | Facility: CLINIC | Age: 58
End: 2023-04-19
Payer: COMMERCIAL

## 2023-05-08 ENCOUNTER — HEALTH MAINTENANCE LETTER (OUTPATIENT)
Age: 58
End: 2023-05-08

## 2023-07-26 ENCOUNTER — VIRTUAL VISIT (OUTPATIENT)
Dept: ENDOCRINOLOGY | Facility: CLINIC | Age: 58
End: 2023-07-26
Payer: COMMERCIAL

## 2023-07-26 VITALS — BODY MASS INDEX: 30.62 KG/M2 | WEIGHT: 202 LBS | HEIGHT: 68 IN

## 2023-07-26 DIAGNOSIS — K75.81 STEATOHEPATITIS, NONALCOHOLIC: Primary | ICD-10-CM

## 2023-07-26 PROCEDURE — 99443 PR PHYSICIAN TELEPHONE EVALUATION 21-30 MIN: CPT | Mod: 93 | Performed by: SURGERY

## 2023-07-26 ASSESSMENT — PAIN SCALES - GENERAL: PAINLEVEL: NO PAIN (0)

## 2023-07-26 NOTE — PROGRESS NOTES
Stephanie Sanches is a 57 year old who is being evaluated via a billable video visit.      How would you like to obtain your AVS? MyChart  If the video visit is dropped, the invitation should be resent by: Text to cell phone: [phone]  Will anyone else be joining your video visit? No  If patient encounters technical issues they should call [phone]    During this virtual visit the patient is located in [place], patient verifies this as the location during the entirety of this visit.     This is a 25-minute Doximity telephone visit starting at approximately 7:01 AM.  This patient underwent a vertical sleeve gastrectomy on April 5, 2021.  Patient had known NAM with fibrosis stage II.  Patient had a highest weight ever of 385 pounds.  She currently weighs approximately 190 pounds.  Her weight has been stable.  Subsequent to our last visit she now has taken on an additional job.  She reports that her diet remains stable her appetite and food choices are something that she can control.  We spent time discussing strategies for weight maintenance.  We talked about the importance of stress management and how that might impact food cues.  We also talked about the importance of mild physical activity.  The patient is not eager to use Ozempic or other injectables postop.  She does have slight recurrence of her reflux.  She is able to manage this with Tums.  If she requires more medications or has worsening of symptoms we might consider evaluating for a recurrent hiatal hernia and an upper endoscopy.  She notes some significant issues with excess skin particularly around her bellybutton and being necessary to manage this to prevent infection she also complains of tightness and issues around the skin around her arms.  Her routine annual labs will be drawn by her primary care physician.  And we will see the patient in 1 year.

## 2023-07-26 NOTE — LETTER
7/26/2023       RE: Stephanie Sanches  [address] [address]     Dear Colleague,    Thank you for referring your patient, Stephanie Sanches, to the [facility] WEIGHT MANAGEMENT CLINIC [place] at [facility]. Please see a copy of my visit note below.    Stephanie Sanches is a 57 year old who is being evaluated via a billable video visit.      How would you like to obtain your AVS? MyChart  If the video visit is dropped, the invitation should be resent by: Text to cell phone: [phone]  Will anyone else be joining your video visit? No  If patient encounters technical issues they should call [phone]    During this virtual visit the patient is located in [place], patient verifies this as the location during the entirety of this visit.     This is a 25-minute Doximity telephone visit starting at approximately 7:01 AM.  This patient underwent a vertical sleeve gastrectomy on April 5, 2021.  Patient had known NAM with fibrosis stage II.  Patient had a highest weight ever of 385 pounds.  She currently weighs approximately 190 pounds.  Her weight has been stable.  Subsequent to our last visit she now has taken on an additional job.  She reports that her diet remains stable her appetite and food choices are something that she can control.  We spent time discussing strategies for weight maintenance.  We talked about the importance of stress management and how that might impact food cues.  We also talked about the importance of mild physical activity.  The patient is not eager to use Ozempic or other injectables postop.  She does have slight recurrence of her reflux.  She is able to manage this with Tums.  If she requires more medications or has worsening of symptoms we might consider evaluating for a recurrent hiatal hernia and an upper endoscopy.  She notes some significant issues with excess skin particularly around her bellybutton and being  necessary to manage this to prevent infection she also complains of tightness and issues around the skin around her arms.  Her routine annual labs will be drawn by her primary care physician.  And we will see the patient in 1 year.      Again, thank you for allowing me to participate in the care of your patient.      Sincerely,    Martin Zambrano MD

## 2023-07-26 NOTE — NURSING NOTE
"Chief Complaint   Patient presents with    RECHECK     1 year post-op, discuss liver biopsy       Vitals:    07/26/23 0641   Weight: 91.6 kg (202 lb)   Height: 1.715 m (5' 7.5\")       Body mass index is 31.17 kg/m .                          Yifan Richards, EMT    "

## 2023-10-11 ENCOUNTER — TELEPHONE (OUTPATIENT)
Dept: ENDOCRINOLOGY | Facility: CLINIC | Age: 58
End: 2023-10-11

## 2023-10-11 NOTE — TELEPHONE ENCOUNTER
Patient needs to be rescheduled for their virtual visit due to Reason for Reschedule: [place]-of-State    Appointment mode: Video  Provider: Farida Seymour

## 2023-10-12 ENCOUNTER — TELEPHONE (OUTPATIENT)
Dept: ENDOCRINOLOGY | Facility: CLINIC | Age: 58
End: 2023-10-12
Payer: COMMERCIAL

## 2023-10-12 NOTE — TELEPHONE ENCOUNTER
NIDA and sent mychart regarding the following:    Reschedule 10/11/23 appointment with Farida Seymour

## 2023-10-14 ENCOUNTER — HEALTH MAINTENANCE LETTER (OUTPATIENT)
Age: 58
End: 2023-10-14

## 2023-10-20 ENCOUNTER — VIRTUAL VISIT (OUTPATIENT)
Dept: ENDOCRINOLOGY | Facility: CLINIC | Age: 58
End: 2023-10-20
Payer: COMMERCIAL

## 2023-10-20 VITALS — BODY MASS INDEX: 30.62 KG/M2 | HEIGHT: 68 IN | WEIGHT: 202 LBS

## 2023-10-20 DIAGNOSIS — Z98.84 S/P LAPAROSCOPIC SLEEVE GASTRECTOMY: ICD-10-CM

## 2023-10-20 DIAGNOSIS — E66.811 CLASS 1 OBESITY WITH SERIOUS COMORBIDITY AND BODY MASS INDEX (BMI) OF 30.0 TO 30.9 IN ADULT, UNSPECIFIED OBESITY TYPE: Primary | ICD-10-CM

## 2023-10-20 DIAGNOSIS — K75.81 NASH (NONALCOHOLIC STEATOHEPATITIS): ICD-10-CM

## 2023-10-20 DIAGNOSIS — E11.8 DIABETES MELLITUS TYPE 2 WITH COMPLICATIONS (H): ICD-10-CM

## 2023-10-20 DIAGNOSIS — E78.5 HYPERLIPIDEMIA, UNSPECIFIED HYPERLIPIDEMIA TYPE: ICD-10-CM

## 2023-10-20 PROCEDURE — 99214 OFFICE O/P EST MOD 30 MIN: CPT | Mod: VID | Performed by: NURSE PRACTITIONER

## 2023-10-20 RX ORDER — BUPROPION HYDROCHLORIDE 100 MG/1
TABLET, EXTENDED RELEASE ORAL
Qty: 60 TABLET | Refills: 3 | Status: SHIPPED | OUTPATIENT
Start: 2023-10-20

## 2023-10-20 RX ORDER — NALTREXONE HYDROCHLORIDE 50 MG/1
TABLET, FILM COATED ORAL
Qty: 30 TABLET | Refills: 3 | Status: SHIPPED | OUTPATIENT
Start: 2023-10-20

## 2023-10-20 RX ORDER — CODEINE PHOSPHATE AND GUAIFENESIN 10; 100 MG/5ML; MG/5ML
5 SOLUTION ORAL
COMMUNITY
Start: 2023-10-04

## 2023-10-20 ASSESSMENT — PAIN SCALES - GENERAL: PAINLEVEL: MILD PAIN (3)

## 2023-10-20 NOTE — PROGRESS NOTES
Virtual Visit Details    Type of service:  Video Visit     Originating Location (pt. Location): Home    Distant Location (provider location):  Off-site  Platform used for Video Visit: [facility] Bariatric Surgery Note    RE: Stephanie Sanches  MR#: [id number]  : 1965  VISIT DATE: Oct 20, 2023      Dear Benson Allen,    I had the pleasure of seeing your patient, Stephanie Sanches, in my post-bariatric surgery assessment clinic.    Assessment & Plan   Problem List Items Addressed This Visit        Digestive    Class 1 obesity with serious comorbidity and body mass index (BMI) of 30.0 to 30.9 in adult - Primary     Lots of stress which has increased urges to stress eat and emotionally eat. Had difficulty with this prior to surgery and now with the level of stress she has, finding it difficult to avoid. Trying to make good swaps. Trying to work on stress management as able.     Will be doing genetic testing for gene associated with pancreatic cancer which mom currently has. Without the gene she has a 50% chance of pancreatitic cancer.     Continue with good routines  Start wellbutrin- week 1 take 1 tablet in the morning, week 2 and therafter take 1 tablet twice daily (breakfast and dinner)   Start naltrexone- week 1 take 1/2 tablet with wellbutrin, week 2 and therafter take 1/2 tab twice daily with wellbutrin   Continue with great swaps for sweet cravings!   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 2.5 years s/p sleeve with [facility].   4/2021    NBS 2020 with BMI 50     HISTORY OF PRESENT ILLNESS:      10/19/2023     8:16 PM   Questions Regarding Prior Weight Loss Surgery Reviewed With Patient   I had the following weight loss procedure Sleeve Gastrectomy   What year was your surgery? 2021   How has your weight changed since your last visit? I have gained weight   Do you currently have any of the following Heartburn, acid reflux, or GERD (acid reflux disease)?   Do you have any concerns today? Weight gain     Last seen 4/2023- discussed stress eating, sleep, stress management, rashes in skin folds - discussed ozempic   7/2023 Dr. Zambrano - discussed liver biopsy, stress management, physical activity, minor reflux, excess skin   10/2023- [employer] message. lots of stress to manage- felt weight was out of control. sent information about contrave and had already discussed ozempic     Recent diet changes:   Previously had a lot of stress eating- has noticed this has creaped back in with stress with her mom. Feels like all day struggle. Trying to focus on other things to avoid eating.     3 meals daily   Exercising in the AM   Looking for alternatives to make her feel better     -Water? Still a struggle- using some flavored wayne     Recent exercise/activity changes: 10,000 steps daily with work     Recent stressors:   Mom with pancreatic cancer- recent update- has spread more than initially thought it was - she is going to do chemo for palliative care - makes her want to sleep all day- can't go see her until Next Thursday   -going to see a genetic testing to screen for a gene that increases risk for pancreatic cancer - 50% chance of developing pancreatic cancer     Working 6 days a week - working 2  different jobs - financial concerns if she drops one of these jobs    Several other deaths in her life prior to all of this     Does see counselor regular basis     Vitamins/Labs: 8/9/2023- A1C 5.7     Weight History:      10/19/2023     8:16 PM   --   What is your highest lifetime weight? 385   What is your lowest weight since surgery? (In pounds) 190     Initial Weight (lbs): 326 lbs  Weight: 91.6 kg (202 lb)  Last Visits Weight: 88.5 kg (195 lb)  Cumulative weight loss (lbs): 124  Weight Loss Percentage: 38.04%        10/19/2023     8:16 PM   Questions Regarding Co-Morbidities and Health Concerns Reviewed With Patient   Pre-diabetes Never   Diabetes II Gone away   High Blood Pressure Never   High cholesterol Gone away   Heartburn/Reflux Worsened   Sleep apnea Never   PCOS Never   Back pain Improved   Joint pain Improved   Lower leg swelling Improved           10/19/2023     8:16 PM   Eating Habits   How many meals do you eat per day? 3   Do you snack between meals? Sometimes   How much food are you eating at each meal? Less than 1/2 cup   Are you able to separate your meals and liquids by at least 30 minutes? Yes   Are you able to avoid liquid calories? Yes           10/19/2023     8:16 PM   Exercise Questions Reviewed With Patient   How often do you exercise? 3 to 4 times per week   What is the duration of your exercise (in minutes)? 30 Minutes   What types of exercise do you do? walking    other   What keeps you from being more active? I am as active as I can possbily be       Social History:      10/19/2023     8:16 PM   --   Are you smoking? No   Are you drinking alcohol? No       Medications:  Current Outpatient Medications   Medication     buPROPion (WELLBUTRIN SR) 100 MG 12 hr tablet     guaiFENesin-codeine (ROBITUSSIN AC) 100-10 MG/5ML solution     naltrexone (DEPADE/REVIA) 50 MG tablet     calcium citrate-vitamin D (CITRACAL) 315-200 MG-UNIT TABS per tablet     Cetaphil Moisturizing (CETAPHIL) external  "lotion     cholecalciferol 25 MCG (1000 UT) CHEW     multivitamin w/minerals (THERA-VIT-M) tablet     nystatin (MYCOSTATIN) 354932 UNIT/GM external powder     polyethylene glycol (MIRALAX) 17 GM/Dose powder     Probiotic Product (PROBIOTIC-10 PO)     Thiamine HCl (B-1 PO)     No current facility-administered medications for this visit.         10/19/2023     8:16 PM   --   Do you avoid NSAIDs such as (Ibuprofen, Aleve, Naproxen, Advil)? Yes       ROS:  GI:       10/19/2023     8:16 PM   --   Vomiting No   Diarrhea No   Constipation Yes   Swallowing trouble No   Abdominal pain No   Heartburn Yes     Skin:       10/19/2023     8:16 PM   BAR RBS ROS - SKIN   Rash in skin folds Yes      Now, the redness isn't going away - managing     Psych:       10/19/2023     8:16 PM   --   Depression Yes   Anxiety No     Female Only:       10/19/2023     8:16 PM   BAR RBS ROS -    Female only None of the above   Stress urinary incontinence No       Transferred Records on 05/11/2021   Component Date Value Ref Range Status     TSH 05/11/2021 3.02  0.40 - 3.99 uIU/mL Final     Cholesterol 05/11/2021 164  114 - 200 mg/dL Final     Triglycerides 05/11/2021 133  10 - 200 mg/dL Final     HDL Cholesterol 05/11/2021 50  40 - 60 mg/dL Final     LDL Cholesterol Calculated 05/11/2021 87  <100 mg/dL Final     Potassium 05/11/2021 3.9  3.4 - 5.1 mEq/L Final     Creatinine 05/11/2021 0.78  0.40 - 1.00 mg/dL Final     GFR Estimate 05/11/2021 >60  >60 ml/min/1.73m2 Final     Glucose 05/11/2021 126 (A)  70 - 99 mg/dL Final     AST 05/11/2021 22  10 - 40 IU/L Final     ALT 05/11/2021 19  6 - 31 IU/L Final     Hemoglobin A1C 05/11/2021 5.8 (A)  4.0 - 5.6 % Final             PHYSICAL EXAM:  Objective    Ht 1.715 m (5' 7.5\")   Wt 91.6 kg (202 lb)   BMI 31.17 kg/m    Vitals - Patient Reported  Pain Score: Mild Pain (3)  Pain Loc: Low Back      Vitals:  No vitals were obtained today due to virtual visit.    Physical Exam   GENERAL: Healthy, alert and " no distress  EYES: Eyes grossly normal to inspection.  No discharge or erythema, or obvious scleral/conjunctival abnormalities.  RESP: No audible wheeze, cough, or visible cyanosis.  No visible retractions or increased work of breathing.    SKIN: Visible skin clear. No significant rash, abnormal pigmentation or lesions.  NEURO: Cranial nerves grossly intact.  Mentation and speech appropriate for age.  PSYCH: Mentation appears normal, affect normal/bright, judgement and insight intact, normal speech and appearance well-groomed.        Sincerely,    Farida Seymour, NP

## 2023-10-20 NOTE — ASSESSMENT & PLAN NOTE
Lots of stress which has increased urges to stress eat and emotionally eat. Had difficulty with this prior to surgery and now with the level of stress she has, finding it difficult to avoid. Trying to make good swaps. Trying to work on stress management as able.     Will be doing genetic testing for gene associated with pancreatic cancer which mom currently has. Without the gene she has a 50% chance of pancreatitic cancer.     Continue with good routines  Start wellbutrin- week 1 take 1 tablet in the morning, week 2 and therafter take 1 tablet twice daily (breakfast and dinner)   Start naltrexone- week 1 take 1/2 tablet with wellbutrin, week 2 and therafter take 1/2 tab twice daily with wellbutrin   Continue with great swaps for sweet cravings!   Continue to look at fluids to help with water intake   If you need the cough syrup - stop the naltrexone the day your cough starts  Follow up 3-4 months

## 2023-10-20 NOTE — PATIENT INSTRUCTIONS
Continue with good routines  Start wellbutrin- week 1 take 1 tablet in the morning, week 2 and therafter take 1 tablet twice daily (breakfast and dinner)   Start naltrexone- week 1 take 1/2 tablet with wellbutrin, week 2 and therafter take 1/2 tab twice daily with wellbutrin   Continue with great swaps for sweet cravings!   Continue to look at fluids to help with water intake   If you need the cough syrup - stop the naltrexone the day your cough starts  Follow up 3-4 months

## 2023-10-20 NOTE — NURSING NOTE
Is the patient currently in the state of [place]? YES    Visit mode:VIDEO    If the visit is dropped, the patient can be reconnected by: VIDEO VISIT: Text to cell phone:   Telephone Information:   Mobile [phone]       Will anyone else be joining the visit? NO  (If patient encounters technical issues they should call [phone])    How would you like to obtain your AVS? MyChart    Are changes needed to the allergy or medication list? No    Reason for visit: RECHECK    Skye TAYLOR

## 2023-10-20 NOTE — LETTER
10/20/2023       RE: Stephanie Sanches  [address]  [place] [address]     Dear Colleague,    Thank you for referring your patient, Stephanie Sanches, to the [facility] [place] at [facility]. Please see a copy of my visit note below.    Virtual Visit Details    Type of service:  Video Visit     Originating Location (pt. Location): Home    Distant Location (provider location):  Off-site  Platform used for Video Visit: [facility] Bariatric Surgery Note    RE: Stephanie Sanches  MR#: [id number]  : 1965  VISIT DATE: Oct 20, 2023      Dear Benson Allen,    I had the pleasure of seeing your patient, Stephanie Sanches, in my post-bariatric surgery assessment clinic.    Assessment & Plan   Problem List Items Addressed This Visit          Digestive    Class 1 obesity with serious comorbidity and body mass index (BMI) of 30.0 to 30.9 in adult - Primary     Lots of stress which has increased urges to stress eat and emotionally eat. Had difficulty with this prior to surgery and now with the level of stress she has, finding it difficult to avoid. Trying to make good swaps. Trying to work on stress management as able.     Will be doing genetic testing for gene associated with pancreatic cancer which mom currently has. Without the gene she has a 50% chance of pancreatitic cancer.     Continue with good routines  Start wellbutrin- week 1 take 1 tablet in the morning, week 2 and therafter take 1 tablet twice daily (breakfast and dinner)   Start naltrexone- week 1 take 1/2 tablet with wellbutrin, week 2 and therafter take 1/2 tab twice daily with wellbutrin   Continue with great swaps for sweet cravings!   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 2.5 years s/p sleeve with [facility].   4/2021    NBS 2020 with BMI 50     HISTORY OF PRESENT ILLNESS:      10/19/2023     8:16 PM   Questions Regarding Prior Weight Loss Surgery Reviewed With Patient   I had the following weight loss procedure Sleeve Gastrectomy   What year was your surgery? 2021   How has your weight changed since your last visit? I have gained weight   Do you currently have any of the following Heartburn, acid reflux, or GERD (acid reflux disease)?   Do you have any concerns today? Weight gain     Last seen 4/2023- discussed stress eating, sleep, stress management, rashes in skin folds - discussed ozempic   7/2023 Dr. Zambrano - discussed liver biopsy, stress management, physical activity, minor reflux, excess skin   10/2023- [employer] message. lots of stress to manage- felt weight was out of control. sent information about contrave and had already discussed ozempic     Recent diet changes:   Previously had a lot of stress eating- has noticed this has creaped back in with stress with her mom. Feels like all day struggle. Trying to focus on other things to avoid eating.     3 meals daily   Exercising in the AM   Looking for alternatives to make her feel better     -Water? Still a struggle- using some flavored wayne     Recent exercise/activity changes: 10,000 steps daily with work     Recent stressors:   Mom with pancreatic cancer- recent update- has spread more  than initially thought it was - she is going to do chemo for palliative care - makes her want to sleep all day- can't go see her until Next Thursday   -going to see a genetic testing to screen for a gene that increases risk for pancreatic cancer - 50% chance of developing pancreatic cancer     Working 6 days a week - working 2 different jobs - financial concerns if she drops one of these jobs    Several other deaths in her life prior to all of this     Does see counselor regular basis     Vitamins/Labs: 8/9/2023- A1C 5.7     Weight History:      10/19/2023     8:16 PM   --   What is your highest lifetime weight? 385   What is your lowest weight since surgery? (In pounds) 190     Initial Weight (lbs): 326 lbs  Weight: 91.6 kg (202 lb)  Last Visits Weight: 88.5 kg (195 lb)  Cumulative weight loss (lbs): 124  Weight Loss Percentage: 38.04%        10/19/2023     8:16 PM   Questions Regarding Co-Morbidities and Health Concerns Reviewed With Patient   Pre-diabetes Never   Diabetes II Gone away   High Blood Pressure Never   High cholesterol Gone away   Heartburn/Reflux Worsened   Sleep apnea Never   PCOS Never   Back pain Improved   Joint pain Improved   Lower leg swelling Improved           10/19/2023     8:16 PM   Eating Habits   How many meals do you eat per day? 3   Do you snack between meals? Sometimes   How much food are you eating at each meal? Less than 1/2 cup   Are you able to separate your meals and liquids by at least 30 minutes? Yes   Are you able to avoid liquid calories? Yes           10/19/2023     8:16 PM   Exercise Questions Reviewed With Patient   How often do you exercise? 3 to 4 times per week   What is the duration of your exercise (in minutes)? 30 Minutes   What types of exercise do you do? walking    other   What keeps you from being more active? I am as active as I can possbily be       Social History:      10/19/2023     8:16 PM   --   Are you smoking? No   Are you drinking alcohol? No        Medications:  Current Outpatient Medications   Medication    buPROPion (WELLBUTRIN SR) 100 MG 12 hr tablet    guaiFENesin-codeine (ROBITUSSIN AC) 100-10 MG/5ML solution    naltrexone (DEPADE/REVIA) 50 MG tablet    calcium citrate-vitamin D (CITRACAL) 315-200 MG-UNIT TABS per tablet    Cetaphil Moisturizing (CETAPHIL) external lotion    cholecalciferol 25 MCG (1000 UT) CHEW    multivitamin w/minerals (THERA-VIT-M) tablet    nystatin (MYCOSTATIN) 979829 UNIT/GM external powder    polyethylene glycol (MIRALAX) 17 GM/Dose powder    Probiotic Product (PROBIOTIC-10 PO)    Thiamine HCl (B-1 PO)     No current facility-administered medications for this visit.         10/19/2023     8:16 PM   --   Do you avoid NSAIDs such as (Ibuprofen, Aleve, Naproxen, Advil)? Yes       ROS:  GI:       10/19/2023     8:16 PM   --   Vomiting No   Diarrhea No   Constipation Yes   Swallowing trouble No   Abdominal pain No   Heartburn Yes     Skin:       10/19/2023     8:16 PM   BAR RBS ROS - SKIN   Rash in skin folds Yes      Now, the redness isn't going away - managing     Psych:       10/19/2023     8:16 PM   --   Depression Yes   Anxiety No     Female Only:       10/19/2023     8:16 PM   BAR RBS ROS -    Female only None of the above   Stress urinary incontinence No       Transferred Records on 05/11/2021   Component Date Value Ref Range Status    TSH 05/11/2021 3.02  0.40 - 3.99 uIU/mL Final    Cholesterol 05/11/2021 164  114 - 200 mg/dL Final    Triglycerides 05/11/2021 133  10 - 200 mg/dL Final    HDL Cholesterol 05/11/2021 50  40 - 60 mg/dL Final    LDL Cholesterol Calculated 05/11/2021 87  <100 mg/dL Final    Potassium 05/11/2021 3.9  3.4 - 5.1 mEq/L Final    Creatinine 05/11/2021 0.78  0.40 - 1.00 mg/dL Final    GFR Estimate 05/11/2021 >60  >60 ml/min/1.73m2 Final    Glucose 05/11/2021 126 (A)  70 - 99 mg/dL Final    AST 05/11/2021 22  10 - 40 IU/L Final    ALT 05/11/2021 19  6 - 31 IU/L Final    Hemoglobin A1C 05/11/2021 5.8  "(A)  4.0 - 5.6 % Final             PHYSICAL EXAM:  Objective    Ht 1.715 m (5' 7.5\")   Wt 91.6 kg (202 lb)   BMI 31.17 kg/m    Vitals - Patient Reported  Pain Score: Mild Pain (3)  Pain Loc: Low Back      Vitals:  No vitals were obtained today due to virtual visit.    Physical Exam   GENERAL: Healthy, alert and no distress  EYES: Eyes grossly normal to inspection.  No discharge or erythema, or obvious scleral/conjunctival abnormalities.  RESP: No audible wheeze, cough, or visible cyanosis.  No visible retractions or increased work of breathing.    SKIN: Visible skin clear. No significant rash, abnormal pigmentation or lesions.  NEURO: Cranial nerves grossly intact.  Mentation and speech appropriate for age.  PSYCH: Mentation appears normal, affect normal/bright, judgement and insight intact, normal speech and appearance well-groomed.    Sincerely,    Farida Seymour NP  "

## 2023-10-23 ENCOUNTER — TELEPHONE (OUTPATIENT)
Dept: ENDOCRINOLOGY | Facility: CLINIC | Age: 58
End: 2023-10-23

## 2024-03-02 ENCOUNTER — HEALTH MAINTENANCE LETTER (OUTPATIENT)
Age: 59
End: 2024-03-02

## 2024-07-14 ENCOUNTER — HEALTH MAINTENANCE LETTER (OUTPATIENT)
Age: 59
End: 2024-07-14

## 2024-10-31 ENCOUNTER — VIRTUAL VISIT (OUTPATIENT)
Dept: ENDOCRINOLOGY | Facility: CLINIC | Age: 59
End: 2024-10-31
Payer: COMMERCIAL

## 2024-10-31 VITALS — WEIGHT: 232 LBS | BODY MASS INDEX: 35.16 KG/M2 | HEIGHT: 68 IN

## 2024-10-31 DIAGNOSIS — K75.81 NASH (NONALCOHOLIC STEATOHEPATITIS): ICD-10-CM

## 2024-10-31 DIAGNOSIS — E66.01 CLASS 2 SEVERE OBESITY WITH SERIOUS COMORBIDITY AND BODY MASS INDEX (BMI) OF 35.0 TO 35.9 IN ADULT, UNSPECIFIED OBESITY TYPE (H): Primary | ICD-10-CM

## 2024-10-31 DIAGNOSIS — K21.00 GASTROESOPHAGEAL REFLUX DISEASE WITH ESOPHAGITIS, UNSPECIFIED WHETHER HEMORRHAGE: ICD-10-CM

## 2024-10-31 DIAGNOSIS — E66.9 TYPE 2 DIABETES MELLITUS WITH OBESITY (H): ICD-10-CM

## 2024-10-31 DIAGNOSIS — E11.69 TYPE 2 DIABETES MELLITUS WITH OBESITY (H): ICD-10-CM

## 2024-10-31 DIAGNOSIS — E66.812 CLASS 2 SEVERE OBESITY WITH SERIOUS COMORBIDITY AND BODY MASS INDEX (BMI) OF 35.0 TO 35.9 IN ADULT, UNSPECIFIED OBESITY TYPE (H): Primary | ICD-10-CM

## 2024-10-31 DIAGNOSIS — Z98.84 S/P LAPAROSCOPIC SLEEVE GASTRECTOMY: ICD-10-CM

## 2024-10-31 PROCEDURE — G2211 COMPLEX E/M VISIT ADD ON: HCPCS | Mod: 95 | Performed by: NURSE PRACTITIONER

## 2024-10-31 PROCEDURE — 99215 OFFICE O/P EST HI 40 MIN: CPT | Mod: 95 | Performed by: NURSE PRACTITIONER

## 2024-10-31 RX ORDER — TIRZEPATIDE 2.5 MG/.5ML
2.5 INJECTION, SOLUTION SUBCUTANEOUS
Qty: 2 ML | Refills: 0 | Status: SHIPPED | OUTPATIENT
Start: 2024-10-31

## 2024-10-31 RX ORDER — TIRZEPATIDE 5 MG/.5ML
5 INJECTION, SOLUTION SUBCUTANEOUS
Qty: 2 ML | Refills: 2 | Status: SHIPPED | OUTPATIENT
Start: 2024-10-31

## 2024-10-31 NOTE — NURSING NOTE
Current patient location:  cousin's home in [place]     Is the patient currently in the state of [place]? YES    Visit mode:VIDEO    If the visit is dropped, the patient can be reconnected by: VIDEO VISIT: Text to cell phone:   Telephone Information:   Mobile [phone]       Will anyone else be joining the visit? NO  (If patient encounters technical issues they should call [phone])    Are changes needed to the allergy or medication list? No    Are refills needed on medications prescribed by this physician? Discuss with provider    Rooming Documentation:  Questionnaire(s) completed    Reason for visit: Follow Up    Betty TAYLOR

## 2024-10-31 NOTE — PATIENT INSTRUCTIONS
Stop wellbutrin and naltrexone   Start omeprazole   Start with smaller bites, slowing down, chewing to apple sauce consistency  Will need to consider EGD with dilation if problem persists   Add zinc 100mg daily   Start mounjaro - 2.5mg x 4 weeks and then 5mg weekly until follow up   Follow up 3 months     For mounjaro-  start with smaller portions  Prioritize protein   Make sure you stay hydrated  Avoid missing meals

## 2024-10-31 NOTE — LETTER
10/31/2024       RE: Stephanie Sanches  [address]  [place] [address]     Dear Colleague,    Thank you for referring your patient, Stephanie Sanches, to the [facility] [place] at [facility]. Please see a copy of my visit note below.    Return Bariatric Surgery Note    RE: Stephanie Sanches  MR#: [id number]  : 1965  VISIT DATE: Oct 31, 2024      Dear Benson Allen,    I had the pleasure of seeing your patient, Stephanie Sanches, in my post-bariatric surgery assessment clinic.    Assessment & Plan  Problem List Items Addressed This Visit          Digestive    Class 2 severe obesity with serious comorbidity and body mass index (BMI) of 35.0 to 35.9 in adult (H) - Primary     Has not found Wellbutrin or naltrexone beneficial.  Given history of MASH would recommend stopping both medications.  We discussed alternatives to help with food noise, cravings and hunger.  We opted to try Mounjaro.  She took Victoza preop but had side effects.  She does not think she ever took Ozempic which was prescribed preop.  She is open to trying Mounjaro which has been shown to be helpful in the setting of MASH.    Worsening reflux and heartburn without specific known triggers.  Symptoms are through the day and at night.  She is not currently taking anything for her symptoms.  She denies any smoking or alcohol or NSAIDs.  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  Would like her to start taking omeprazole daily.      Additionally, she has had some dysphagia and vomiting.  Vomiting is usually after eating something and food feels like it gets stuck first followed by dry heaving or vomiting phlegm or undigested food.  Vomiting occurs maybe once a week and not specifically associated with any foods, could be even attributed to drinking liquids.  We discussed  concern for stricture but given symptoms are not all of the time I question if she is eating and drinking too quickly or taking too big of bites.  She will start by going back to using smaller utensils and taking smaller bites and chewing to applesauce consistency.  If her symptoms persist we will move forward with an endoscopy for possible dilation.    Labs were drawn by outside provider.  Bariatric labs were included.  Reviewed these labs.  Patient notes deficiencies were not addressed.  Would recommend starting zinc based on labs reviewed.    Stop wellbutrin and naltrexone   Start omeprazole   Start with smaller bites, slowing down, chewing to apple sauce consistency  Will need to consider EGD with dilation if problem persists   Add zinc 100mg daily   Start mounjaro - 2.5mg x 4 weeks and then 5mg weekly until follow up   Follow up 3 months          Relevant Medications    omeprazole (PRILOSEC) 20 MG DR capsule    MOUNJARO 2.5 MG/0.5ML SOAJ    MOUNJARO 5 MG/0.5ML SOAJ    NAM (nonalcoholic steatohepatitis)    Relevant Medications    MOUNJARO 2.5 MG/0.5ML SOAJ    MOUNJARO 5 MG/0.5ML SOAJ       Endocrine    Type 2 diabetes mellitus with obesity (H)    Relevant Medications    MOUNJARO 2.5 MG/0.5ML SOAJ    MOUNJARO 5 MG/0.5ML SOAJ       Other    S/P laparoscopic sleeve gastrectomy    Relevant Medications    omeprazole (PRILOSEC) 20 MG DR capsule     Other Visit Diagnoses       Gastroesophageal reflux disease with esophagitis, unspecified whether hemorrhage        Relevant Medications    omeprazole (PRILOSEC) 20 MG DR capsule               40 minutes spent by me on the date of the encounter doing chart review, history and exam, documentation and further activities per the note    The longitudinal plan of care for the diagnosis(es)/condition(s) as documented were addressed during this visit. Due to the added complexity in care, I will continue to support Lorena in the subsequent management and with ongoing continuity of  care.    CHIEF COMPLAINT: Post-bariatric surgery follow-up. NBS 2020 BMI 50. S/p sleeve with Dr. Zambrano 4/2021     HISTORY OF PRESENT ILLNESS:      10/28/2024     6:35 AM   Questions Regarding Prior Weight Loss Surgery Reviewed With Patient   I had the following weight loss procedure Sleeve Gastrectomy   What year was your surgery? 2022   How has your weight changed since your last visit? I have gained weight   Do you currently have any of the following Heartburn, acid reflux, or GERD (acid reflux disease)?   Do you have any concerns today? Weight issues     MASH stable.     Anti-obesity medications:     Current:     Failed/contraindicated:   Wellbutrin/ naltrexone - not helpful     Recent diet changes:   Stress eating/ emotional eating  More snacking- a bit or two of whatever is around   Still eats really small portions     -Protein - protein shake daily   -Water- mostly good     Recent exercise/activity changes: limited by time     Recent stressors:   Spent months driving to [place] to care for mother   Now, mother is living with her and she is full time care giver   Pancreatic cancer is advancing    Still working full time   Still responsible for 2 aunts     Recent sleep changes: okay     Vitamins/Labs: 7/2024  Multi with iron- kids chewable   Calcium   B12       GERD symptoms:  daily - regardless of food /drink, no specific triggers, sometimes waking at night with it   Vomiting- food getting stuck or pain as soon as she swallows, gets dry heaves and then vomits phlegm or a little undigested food - maybe vomiting 1 time a week   Some bigger pills get stuck. Usually does chewable / liquid meds if possible       Weight History:      10/28/2024     6:35 AM   --   What is your highest lifetime weight? 385   What is your lowest weight since surgery? (In pounds) 198        Weight: 105.2 kg (232 lb)                 10/28/2024     6:35 AM   Questions Regarding Co-Morbidities and Health Concerns Reviewed With  Patient   Pre-diabetes Gone away   Diabetes II Worsened   High Blood Pressure Never   High cholesterol Never   Heartburn/Reflux Worsened   Sleep apnea Never   PCOS Stayed the same   Back pain Improved   Joint pain Improved   Lower leg swelling Stayed the same           10/28/2024     6:35 AM   Eating Habits   How many meals do you eat per day? 3   Do you snack between meals? Sometimes   How much food are you eating at each meal? 1/2 cup to 1 cup   Are you able to separate your meals and liquids by at least 30 minutes? Sometimes   Are you able to avoid liquid calories? Sometimes           10/28/2024     6:35 AM   Exercise Questions Reviewed With Patient   How often do you exercise? 1 to 2 times per week   What is the duration of your exercise (in minutes)? 15 Minutes   What types of exercise do you do? walking    other   What keeps you from being more active? Lack of Time       Social History:      10/28/2024     6:35 AM   --   Are you smoking? No   Are you drinking alcohol? No       Medications:  Current Outpatient Medications   Medication Sig Dispense Refill     MOUNJARO 2.5 MG/0.5ML SOAJ Inject 0.5 mLs (2.5 mg) subcutaneously every 7 days. 2 mL 0     MOUNJARO 5 MG/0.5ML SOAJ Inject 0.5 mLs (5 mg) subcutaneously every 7 days. 2 mL 2     omeprazole (PRILOSEC) 20 MG DR capsule Take 1 capsule (20 mg) by mouth daily. 90 capsule 3     calcium citrate-vitamin D (CITRACAL) 315-200 MG-UNIT TABS per tablet Take 1 tablet by mouth 2 times daily       Cetaphil Moisturizing (CETAPHIL) external lotion Apply topically as needed       cholecalciferol 25 MCG (1000 UT) CHEW        guaiFENesin-codeine (ROBITUSSIN AC) 100-10 MG/5ML solution Take 5 mLs by mouth       multivitamin w/minerals (THERA-VIT-M) tablet Take 1 tablet by mouth 2 times daily       nystatin (MYCOSTATIN) 614530 UNIT/GM external powder Apply topically 2 times daily as needed (rashes in skin folds) 60 g 1     polyethylene glycol (MIRALAX) 17 GM/Dose powder Take 1  "packet by mouth       Probiotic Product (PROBIOTIC-10 PO) Take 1 capsule by mouth every morning        Thiamine HCl (B-1 PO)        No current facility-administered medications for this visit.         10/28/2024     6:35 AM   --   Do you avoid NSAIDs such as (Ibuprofen, Aleve, Naproxen, Advil)? Yes       ROS:  GI:       10/28/2024     6:35 AM   --   Vomiting Yes   Diarrhea No   Constipation Yes   Swallowing trouble Yes   Abdominal pain No   Heartburn Yes     Skin:       10/28/2024     6:35 AM   BAR RBS ROS - SKIN   Rash in skin folds Yes     Psych:       10/28/2024     6:35 AM   --   Depression Yes   Anxiety Yes     Female Only:       10/28/2024     6:35 AM   BAR RBS ROS -    Female only None of the above   Stress urinary incontinence No       Transferred Records on 05/11/2021   Component Date Value Ref Range Status     TSH 05/11/2021 3.02  0.40 - 3.99 uIU/mL Final     Cholesterol 05/11/2021 164  114 - 200 mg/dL Final     Triglycerides 05/11/2021 133  10 - 200 mg/dL Final     HDL Cholesterol 05/11/2021 50  40 - 60 mg/dL Final     LDL Cholesterol Calculated 05/11/2021 87  <100 mg/dL Final     Potassium 05/11/2021 3.9  3.4 - 5.1 mEq/L Final     Creatinine 05/11/2021 0.78  0.40 - 1.00 mg/dL Final     GFR Estimate 05/11/2021 >60  >60 ml/min/1.73m2 Final     Glucose 05/11/2021 126 (A)  70 - 99 mg/dL Final     AST 05/11/2021 22  10 - 40 IU/L Final     ALT 05/11/2021 19  6 - 31 IU/L Final     Hemoglobin A1C 05/11/2021 5.8 (A)  4.0 - 5.6 % Final              No data to display                  PHYSICAL EXAM:  Objective   Ht 1.715 m (5' 7.5\")   Wt 105.2 kg (232 lb)   BMI 35.80 kg/m           Vitals:  No vitals were obtained today due to virtual visit.    Physical Exam   GENERAL: alert and no distress  EYES: Eyes grossly normal to inspection.  No discharge or erythema, or obvious scleral/conjunctival abnormalities.  RESP: No audible wheeze, cough, or visible cyanosis.    SKIN: Visible skin clear. No significant rash, " abnormal pigmentation or lesions.  NEURO: Cranial nerves grossly intact.  Mentation and speech appropriate for age.  PSYCH: Appropriate affect, tone, and pace of words        Sincerely,    Farida Seymour NP      Virtual Visit Details    Type of service:  Video Visit     Originating Location (pt. Location): Home    Distant Location (provider location):  Off-site  Platform used for Video Visit: AmWell      Again, thank you for allowing me to participate in the care of your patient.      Sincerely,    Farida Seymour NP

## 2024-10-31 NOTE — PROGRESS NOTES
Return Bariatric Surgery Note    RE: Stephanie Sanches  MR#: [id number]  : 1965  VISIT DATE: Oct 31, 2024      Dear Benson Allen,    I had the pleasure of seeing your patient, Stephanie Sanches, in my post-bariatric surgery assessment clinic.    Assessment & Plan   Problem List Items Addressed This Visit          Digestive    Class 2 severe obesity with serious comorbidity and body mass index (BMI) of 35.0 to 35.9 in adult (H) - Primary     Has not found Wellbutrin or naltrexone beneficial.  Given history of MASH would recommend stopping both medications.  We discussed alternatives to help with food noise, cravings and hunger.  We opted to try Mounjaro.  She took Victoza preop but had side effects.  She does not think she ever took Ozempic which was prescribed preop.  She is open to trying Mounjaro which has been shown to be helpful in the setting of MASH.    Worsening reflux and heartburn without specific known triggers.  Symptoms are through the day and at night.  She is not currently taking anything for her symptoms.  She denies any smoking or alcohol or NSAIDs.  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  Would like her to start taking omeprazole daily.      Additionally, she has had some dysphagia and vomiting.  Vomiting is usually after eating something and food feels like it gets stuck first followed by dry heaving or vomiting phlegm or undigested food.  Vomiting occurs maybe once a week and not specifically associated with any foods, could be even attributed to drinking liquids.  We discussed concern for stricture but given symptoms are not all of the time I question if she is eating and drinking too quickly or taking too big of bites.  She will start by going back to using smaller utensils and taking smaller bites and chewing to applesauce consistency.  If her symptoms persist we will move forward with an endoscopy for  possible dilation.    Labs were drawn by outside provider.  Bariatric labs were included.  Reviewed these labs.  Patient notes deficiencies were not addressed.  Would recommend starting zinc based on labs reviewed.    Stop wellbutrin and naltrexone   Start omeprazole   Start with smaller bites, slowing down, chewing to apple sauce consistency  Will need to consider EGD with dilation if problem persists   Add zinc 100mg daily   Start mounjaro - 2.5mg x 4 weeks and then 5mg weekly until follow up   Follow up 3 months          Relevant Medications    omeprazole (PRILOSEC) 20 MG DR capsule    MOUNJARO 2.5 MG/0.5ML SOAJ    MOUNJARO 5 MG/0.5ML SOAJ    NAM (nonalcoholic steatohepatitis)    Relevant Medications    MOUNJARO 2.5 MG/0.5ML SOAJ    MOUNJARO 5 MG/0.5ML SOAJ       Endocrine    Type 2 diabetes mellitus with obesity (H)    Relevant Medications    MOUNJARO 2.5 MG/0.5ML SOAJ    MOUNJARO 5 MG/0.5ML SOAJ       Other    S/P laparoscopic sleeve gastrectomy    Relevant Medications    omeprazole (PRILOSEC) 20 MG DR capsule     Other Visit Diagnoses       Gastroesophageal reflux disease with esophagitis, unspecified whether hemorrhage        Relevant Medications    omeprazole (PRILOSEC) 20 MG DR capsule               40 minutes spent by me on the date of the encounter doing chart review, history and exam, documentation and further activities per the note    The longitudinal plan of care for the diagnosis(es)/condition(s) as documented were addressed during this visit. Due to the added complexity in care, I will continue to support Lorena in the subsequent management and with ongoing continuity of care.    CHIEF COMPLAINT: Post-bariatric surgery follow-up. NBS 2020 BMI 50. S/p sleeve with Dr. Zambrano 4/2021     HISTORY OF PRESENT ILLNESS:      10/28/2024     6:35 AM   Questions Regarding Prior Weight Loss Surgery Reviewed With Patient   I had the following weight loss procedure Sleeve Gastrectomy   What year was your  surgery? 2022   How has your weight changed since your last visit? I have gained weight   Do you currently have any of the following Heartburn, acid reflux, or GERD (acid reflux disease)?   Do you have any concerns today? Weight issues     MASH stable.     Anti-obesity medications:     Current:     Failed/contraindicated:   Wellbutrin/ naltrexone - not helpful     Recent diet changes:   Stress eating/ emotional eating  More snacking- a bit or two of whatever is around   Still eats really small portions     -Protein - protein shake daily   -Water- mostly good     Recent exercise/activity changes: limited by time     Recent stressors:   Spent months driving to [place] to care for mother   Now, mother is living with her and she is full time care giver   Pancreatic cancer is advancing    Still working full time   Still responsible for 2 aunts     Recent sleep changes: okay     Vitamins/Labs: 7/2024  Multi with iron- kids chewable   Calcium   B12       GERD symptoms:  daily - regardless of food /drink, no specific triggers, sometimes waking at night with it   Vomiting- food getting stuck or pain as soon as she swallows, gets dry heaves and then vomits phlegm or a little undigested food - maybe vomiting 1 time a week   Some bigger pills get stuck. Usually does chewable / liquid meds if possible       Weight History:      10/28/2024     6:35 AM   --   What is your highest lifetime weight? 385   What is your lowest weight since surgery? (In pounds) 198        Weight: 105.2 kg (232 lb)                 10/28/2024     6:35 AM   Questions Regarding Co-Morbidities and Health Concerns Reviewed With Patient   Pre-diabetes Gone away   Diabetes II Worsened   High Blood Pressure Never   High cholesterol Never   Heartburn/Reflux Worsened   Sleep apnea Never   PCOS Stayed the same   Back pain Improved   Joint pain Improved   Lower leg swelling Stayed the same           10/28/2024     6:35 AM   Eating Habits   How many meals do you  eat per day? 3   Do you snack between meals? Sometimes   How much food are you eating at each meal? 1/2 cup to 1 cup   Are you able to separate your meals and liquids by at least 30 minutes? Sometimes   Are you able to avoid liquid calories? Sometimes           10/28/2024     6:35 AM   Exercise Questions Reviewed With Patient   How often do you exercise? 1 to 2 times per week   What is the duration of your exercise (in minutes)? 15 Minutes   What types of exercise do you do? walking    other   What keeps you from being more active? Lack of Time       Social History:      10/28/2024     6:35 AM   --   Are you smoking? No   Are you drinking alcohol? No       Medications:  Current Outpatient Medications   Medication Sig Dispense Refill    MOUNJARO 2.5 MG/0.5ML SOAJ Inject 0.5 mLs (2.5 mg) subcutaneously every 7 days. 2 mL 0    MOUNJARO 5 MG/0.5ML SOAJ Inject 0.5 mLs (5 mg) subcutaneously every 7 days. 2 mL 2    omeprazole (PRILOSEC) 20 MG DR capsule Take 1 capsule (20 mg) by mouth daily. 90 capsule 3    calcium citrate-vitamin D (CITRACAL) 315-200 MG-UNIT TABS per tablet Take 1 tablet by mouth 2 times daily      Cetaphil Moisturizing (CETAPHIL) external lotion Apply topically as needed      cholecalciferol 25 MCG (1000 UT) CHEW       guaiFENesin-codeine (ROBITUSSIN AC) 100-10 MG/5ML solution Take 5 mLs by mouth      multivitamin w/minerals (THERA-VIT-M) tablet Take 1 tablet by mouth 2 times daily      nystatin (MYCOSTATIN) 486751 UNIT/GM external powder Apply topically 2 times daily as needed (rashes in skin folds) 60 g 1    polyethylene glycol (MIRALAX) 17 GM/Dose powder Take 1 packet by mouth      Probiotic Product (PROBIOTIC-10 PO) Take 1 capsule by mouth every morning       Thiamine HCl (B-1 PO)        No current facility-administered medications for this visit.         10/28/2024     6:35 AM   --   Do you avoid NSAIDs such as (Ibuprofen, Aleve, Naproxen, Advil)? Yes       ROS:  GI:       10/28/2024     6:35 AM  "  --   Vomiting Yes   Diarrhea No   Constipation Yes   Swallowing trouble Yes   Abdominal pain No   Heartburn Yes     Skin:       10/28/2024     6:35 AM   BAR RBS ROS - SKIN   Rash in skin folds Yes     Psych:       10/28/2024     6:35 AM   --   Depression Yes   Anxiety Yes     Female Only:       10/28/2024     6:35 AM   BAR RBS ROS -    Female only None of the above   Stress urinary incontinence No       Transferred Records on 05/11/2021   Component Date Value Ref Range Status    TSH 05/11/2021 3.02  0.40 - 3.99 uIU/mL Final    Cholesterol 05/11/2021 164  114 - 200 mg/dL Final    Triglycerides 05/11/2021 133  10 - 200 mg/dL Final    HDL Cholesterol 05/11/2021 50  40 - 60 mg/dL Final    LDL Cholesterol Calculated 05/11/2021 87  <100 mg/dL Final    Potassium 05/11/2021 3.9  3.4 - 5.1 mEq/L Final    Creatinine 05/11/2021 0.78  0.40 - 1.00 mg/dL Final    GFR Estimate 05/11/2021 >60  >60 ml/min/1.73m2 Final    Glucose 05/11/2021 126 (A)  70 - 99 mg/dL Final    AST 05/11/2021 22  10 - 40 IU/L Final    ALT 05/11/2021 19  6 - 31 IU/L Final    Hemoglobin A1C 05/11/2021 5.8 (A)  4.0 - 5.6 % Final              No data to display                  PHYSICAL EXAM:  Objective    Ht 1.715 m (5' 7.5\")   Wt 105.2 kg (232 lb)   BMI 35.80 kg/m           Vitals:  No vitals were obtained today due to virtual visit.    Physical Exam   GENERAL: alert and no distress  EYES: Eyes grossly normal to inspection.  No discharge or erythema, or obvious scleral/conjunctival abnormalities.  RESP: No audible wheeze, cough, or visible cyanosis.    SKIN: Visible skin clear. No significant rash, abnormal pigmentation or lesions.  NEURO: Cranial nerves grossly intact.  Mentation and speech appropriate for age.  PSYCH: Appropriate affect, tone, and pace of words        Sincerely,    Farida Seymour NP      Virtual Visit Details    Type of service:  Video Visit     Originating Location (pt. Location): Home    Distant Location (provider location):  " Off-site  Platform used for Video Visit: Danelle

## 2024-11-01 NOTE — ASSESSMENT & PLAN NOTE
Has not found Wellbutrin or naltrexone beneficial.  Given history of MASH would recommend stopping both medications.  We discussed alternatives to help with food noise, cravings and hunger.  We opted to try Mounjaro.  She took Victoza preop but had side effects.  She does not think she ever took Ozempic which was prescribed preop.  She is open to trying Mounjaro which has been shown to be helpful in the setting of MASH.    Worsening reflux and heartburn without specific known triggers.  Symptoms are through the day and at night.  She is not currently taking anything for her symptoms.  She denies any smoking or alcohol or NSAIDs.  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  Would like her to start taking omeprazole daily.      Additionally, she has had some dysphagia and vomiting.  Vomiting is usually after eating something and food feels like it gets stuck first followed by dry heaving or vomiting phlegm or undigested food.  Vomiting occurs maybe once a week and not specifically associated with any foods, could be even attributed to drinking liquids.  We discussed concern for stricture but given symptoms are not all of the time I question if she is eating and drinking too quickly or taking too big of bites.  She will start by going back to using smaller utensils and taking smaller bites and chewing to applesauce consistency.  If her symptoms persist we will move forward with an endoscopy for possible dilation.    Labs were drawn by outside provider.  Bariatric labs were included.  Reviewed these labs.  Patient notes deficiencies were not addressed.  Would recommend starting zinc based on labs reviewed.    Stop wellbutrin and naltrexone   Start omeprazole   Start with smaller bites, slowing down, chewing to apple sauce consistency  Will need to consider EGD with dilation if problem persists   Add zinc 100mg daily   Start mounjaro - 2.5mg x  4 weeks and then 5mg weekly until follow up   Follow up 3 months

## 2024-12-01 ENCOUNTER — HEALTH MAINTENANCE LETTER (OUTPATIENT)
Age: 59
End: 2024-12-01

## 2025-03-15 ENCOUNTER — HEALTH MAINTENANCE LETTER (OUTPATIENT)
Age: 60
End: 2025-03-15

## 2025-03-21 ENCOUNTER — VIRTUAL VISIT (OUTPATIENT)
Dept: ENDOCRINOLOGY | Facility: CLINIC | Age: 60
End: 2025-03-21
Attending: NURSE PRACTITIONER
Payer: COMMERCIAL

## 2025-03-21 ENCOUNTER — MEDICAL CORRESPONDENCE (OUTPATIENT)
Dept: HEALTH INFORMATION MANAGEMENT | Facility: CLINIC | Age: 60
End: 2025-03-21

## 2025-03-21 VITALS — BODY MASS INDEX: 31.83 KG/M2 | HEIGHT: 68 IN | WEIGHT: 210 LBS

## 2025-03-21 DIAGNOSIS — Z98.84 S/P LAPAROSCOPIC SLEEVE GASTRECTOMY: ICD-10-CM

## 2025-03-21 DIAGNOSIS — E11.69 TYPE 2 DIABETES MELLITUS WITH OBESITY (H): ICD-10-CM

## 2025-03-21 DIAGNOSIS — E66.812 CLASS 2 SEVERE OBESITY WITH SERIOUS COMORBIDITY AND BODY MASS INDEX (BMI) OF 35.0 TO 35.9 IN ADULT, UNSPECIFIED OBESITY TYPE (H): Primary | ICD-10-CM

## 2025-03-21 DIAGNOSIS — K75.81 NASH (NONALCOHOLIC STEATOHEPATITIS): ICD-10-CM

## 2025-03-21 DIAGNOSIS — E66.9 TYPE 2 DIABETES MELLITUS WITH OBESITY (H): ICD-10-CM

## 2025-03-21 DIAGNOSIS — E66.01 CLASS 2 SEVERE OBESITY WITH SERIOUS COMORBIDITY AND BODY MASS INDEX (BMI) OF 35.0 TO 35.9 IN ADULT, UNSPECIFIED OBESITY TYPE (H): Primary | ICD-10-CM

## 2025-03-21 PROCEDURE — 98006 SYNCH AUDIO-VIDEO EST MOD 30: CPT | Performed by: NURSE PRACTITIONER

## 2025-03-21 PROCEDURE — G2211 COMPLEX E/M VISIT ADD ON: HCPCS | Mod: 95 | Performed by: NURSE PRACTITIONER

## 2025-03-21 PROCEDURE — 1126F AMNT PAIN NOTED NONE PRSNT: CPT | Mod: 95 | Performed by: NURSE PRACTITIONER

## 2025-03-21 ASSESSMENT — PAIN SCALES - GENERAL: PAINLEVEL_OUTOF10: NO PAIN (0)

## 2025-03-21 NOTE — PROGRESS NOTES
"Return Bariatric Surgery Note    RE: Stephanie Sanches  MR#: [id number]  : 1965  VISIT DATE: Mar 21, 2025      Dear Benson Allen,    I had the pleasure of seeing your patient, Stephanie Sanches, in my post-bariatric surgery assessment clinic.    Assessment & Plan   Problem List Items Addressed This Visit          Digestive    Class 2 severe obesity with serious comorbidity and body mass index (BMI) of 35.0 to 35.9 in adult (H) - Primary     Stress feels more manageable now. Less travel to care for mother now that she lives with her. Working on creating good boundaries and prioritize self care.     Has found zepbound very helpful with appetite/ food noise/ hunger. Has some nausea but manageable. Will continue current plan.     Continue mounjaro 5mg weekly   Continue to be mindful of protein and hydration   Great work with mindfulness around stress management  Try podcast \"nothing much happens\"   Plastic surgery referral placed  Follow up with me in 6 months          Relevant Orders    Adult Plastic Surgery  Referral    NAM (nonalcoholic steatohepatitis)    Relevant Orders    Adult Plastic Surgery  Referral       Endocrine    Type 2 diabetes mellitus with obesity (H)    Relevant Orders    Adult Plastic Surgery  Referral       Other    S/P laparoscopic sleeve gastrectomy    Relevant Orders    Adult Plastic Surgery  Referral          30 minutes spent by me on the date of the encounter doing chart review, history and exam, documentation and further activities per the note    The longitudinal plan of care for the diagnosis(es)/condition(s) as documented were addressed during this visit. Due to the added complexity in care, I will continue to support Lorena in the subsequent management and with ongoing continuity of care.    CHIEF COMPLAINT: Post-bariatric surgery follow-up. NBS  BMI 50. S/p sleeve with Dr. Zambrano 2021     HISTORY OF PRESENT ILLNESS:      10/28/2024     " 6:35 AM   Questions Regarding Prior Weight Loss Surgery Reviewed With Patient   I had the following weight loss procedure Sleeve Gastrectomy   What year was your surgery? 2022   How has your weight changed since your last visit? I have gained weight   Do you currently have any of the following Heartburn, acid reflux, or GERD (acid reflux disease)?   Do you have any concerns today? Weight issues     Did genetic testing that showed she did not have the gene for the pancreatic cancer     Anti-obesity medications:     Current:   Mounjaro 5mg   -some nausea- has vomited a couple times (just when getting too full)       Failed/contraindicated:   Wellbutrin/ naltrexone - not helpful     Recent diet changes:   Protein shakes if not able to eat   Starting with smaller portions   Small spoons   Slowing down at meals   Stopping when satisfied     -Protein- adequate   -Water- trying hard - 32oz water and some electrolyte beverages     Recent exercise/activity changes:   Walking some   Getting outside   Got weighted walking sticks   Resistance training     Recent stressors:   Mom living with her now- full time care giver (mom has pancreatic cancer)   Works for [employer]- offered severance for leave, has been told they may bet laid off anyway   Working on self care     Recent sleep changes:   Up a couple times a night   Grinding teeth at night - got a mouth guard     Vitamins/Labs: 1/2025 wnl     GERD symptoms: stopped omeprazole and prevacid because not helping. Tums helps as needed. Small hiatal hernia.     Rashes   -abdominal pannus - worse in belly button - using OTC (baby powder with cornstarch) things prevent and/or treat  -bilateral under arms, also very heavy and causes discomfort       Weight History:      10/28/2024     6:35 AM   --   What is your highest lifetime weight? 385   What is your lowest weight since surgery? (In pounds) 198     Initial Weight (lbs): 326 lbs  Weight: 95.3 kg (210 lb)  Last Visits  Weight: 107.5 kg (237 lb) (11/2024 with hepatology, wrong weight 10/2024)  Cumulative weight loss (lbs): 116  Weight Loss Percentage: 35.58%        10/28/2024     6:35 AM   Questions Regarding Co-Morbidities and Health Concerns Reviewed With Patient   Pre-diabetes Gone away   Diabetes II Worsened   High Blood Pressure Never   High cholesterol Never   Heartburn/Reflux Worsened   Sleep apnea Never   PCOS Stayed the same   Back pain Improved   Joint pain Improved   Lower leg swelling Stayed the same           10/28/2024     6:35 AM   Eating Habits   How many meals do you eat per day? 3   Do you snack between meals? Sometimes   How much food are you eating at each meal? 1/2 cup to 1 cup   Are you able to separate your meals and liquids by at least 30 minutes? Sometimes   Are you able to avoid liquid calories? Sometimes           10/28/2024     6:35 AM   Exercise Questions Reviewed With Patient   How often do you exercise? 1 to 2 times per week   What is the duration of your exercise (in minutes)? 15 Minutes   What types of exercise do you do? walking    other   What keeps you from being more active? Lack of Time       Social History:      10/28/2024     6:35 AM   --   Are you smoking? No   Are you drinking alcohol? No       Medications:  Current Outpatient Medications   Medication Sig Dispense Refill    calcium citrate-vitamin D (CITRACAL) 315-200 MG-UNIT TABS per tablet Take 1 tablet by mouth 2 times daily      Cetaphil Moisturizing (CETAPHIL) external lotion Apply topically as needed      cholecalciferol 25 MCG (1000 UT) CHEW       guaiFENesin-codeine (ROBITUSSIN AC) 100-10 MG/5ML solution Take 5 mLs by mouth      MOUNJARO 2.5 MG/0.5ML SOAJ Inject 0.5 mLs (2.5 mg) subcutaneously every 7 days. 2 mL 0    MOUNJARO 5 MG/0.5ML SOAJ auto-injector pen Inject 0.5 mLs (5 mg) subcutaneously every 7 days. 2 mL 0    multivitamin w/minerals (THERA-VIT-M) tablet Take 1 tablet by mouth 2 times daily      nystatin (MYCOSTATIN) 323587  "UNIT/GM external powder Apply topically 2 times daily as needed (rashes in skin folds) 60 g 1    omeprazole (PRILOSEC) 20 MG DR capsule Take 1 capsule (20 mg) by mouth daily. 90 capsule 3    polyethylene glycol (MIRALAX) 17 GM/Dose powder Take 1 packet by mouth      Probiotic Product (PROBIOTIC-10 PO) Take 1 capsule by mouth every morning       Thiamine HCl (B-1 PO)        No current facility-administered medications for this visit.         10/28/2024     6:35 AM   --   Do you avoid NSAIDs such as (Ibuprofen, Aleve, Naproxen, Advil)? Yes       ROS:  GI:       10/28/2024     6:35 AM   --   Vomiting Yes   Diarrhea No   Constipation Yes   Swallowing trouble Yes   Abdominal pain No   Heartburn Yes     Skin:       10/28/2024     6:35 AM   BAR RBS ROS - SKIN   Rash in skin folds Yes     Psych:       10/28/2024     6:35 AM   --   Depression Yes   Anxiety Yes     Female Only:       10/28/2024     6:35 AM   BAR RBS ROS -    Female only None of the above   Stress urinary incontinence No       Transferred Records on 05/11/2021   Component Date Value Ref Range Status    TSH 05/11/2021 3.02  0.40 - 3.99 uIU/mL Final    Cholesterol 05/11/2021 164  114 - 200 mg/dL Final    Triglycerides 05/11/2021 133  10 - 200 mg/dL Final    HDL Cholesterol 05/11/2021 50  40 - 60 mg/dL Final    LDL Cholesterol Calculated 05/11/2021 87  <100 mg/dL Final    Potassium 05/11/2021 3.9  3.4 - 5.1 mEq/L Final    Creatinine 05/11/2021 0.78  0.40 - 1.00 mg/dL Final    GFR Estimate 05/11/2021 >60  >60 ml/min/1.73m2 Final    Glucose 05/11/2021 126 (A)  70 - 99 mg/dL Final    AST 05/11/2021 22  10 - 40 IU/L Final    ALT 05/11/2021 19  6 - 31 IU/L Final    Hemoglobin A1C 05/11/2021 5.8 (A)  4.0 - 5.6 % Final         PHYSICAL EXAM:  Objective    Ht 1.715 m (5' 7.5\")   Wt 95.3 kg (210 lb)   BMI 32.41 kg/m             Physical Exam   GENERAL: alert and no distress  EYES: Eyes grossly normal to inspection.  No discharge or erythema, or obvious " scleral/conjunctival abnormalities.  RESP: No audible wheeze, cough, or visible cyanosis.    SKIN: Visible skin clear. No significant rash, abnormal pigmentation or lesions.  NEURO: Cranial nerves grossly intact.  Mentation and speech appropriate for age.  PSYCH: Appropriate affect, tone, and pace of words        Sincerely,    Farida Seymour NP      Virtual Visit Details    Type of service:  Video Visit     Originating Location (pt. Location): Home    Distant Location (provider location):  Off-site  Platform used for Video Visit: [employer]

## 2025-03-21 NOTE — ASSESSMENT & PLAN NOTE
"Stress feels more manageable now. Less travel to care for mother now that she lives with her. Working on creating good boundaries and prioritize self care.     Has found zepbound very helpful with appetite/ food noise/ hunger. Has some nausea but manageable. Will continue current plan.     Continue mounjaro 5mg weekly   Continue to be mindful of protein and hydration   Great work with mindfulness around stress management  Try podcast \"nothing much happens\"   Plastic surgery referral placed  Follow up with me in 6 months   "

## 2025-03-21 NOTE — NURSING NOTE
Current patient location: Patient declined to provide     Is the patient currently in the state of [place]? YES    Visit mode: VIDEO    If the visit is dropped, the patient can be reconnected by:VIDEO VISIT: Text to cell phone:   Telephone Information:   Mobile [phone]       Will anyone else be joining the visit? NO  (If patient encounters technical issues they should call [phone])    Are changes needed to the allergy or medication list? No    Are refills needed on medications prescribed by this physician? Discuss with provider    Rooming Documentation:  Questionnaire(s) completed    Reason for visit: ALTAGRACIA TAYLOR

## 2025-03-21 NOTE — PATIENT INSTRUCTIONS
"Continue mounjaro 5mg weekly   Continue to be mindful of protein and hydration   Great work with mindfulness around stress management  Try podcast \"nothing much happens\"   Plastic surgery referral placed  Follow up with me in 6 months   "

## 2025-03-21 NOTE — LETTER
"3/21/2025       RE: Stephanie Sanches  [address]  [place] [address]     Dear Colleague,    Thank you for referring your patient, Stephanie Sanches, to the [facility] [place] [facility]. Please see a copy of my visit note below.    Return Bariatric Surgery Note    RE: Stephanie Sanches  MR#: [id number]  : 1965  VISIT DATE: Mar 21, 2025      Dear Benson Allen,    I had the pleasure of seeing your patient, Stephanie Sanches, in my post-bariatric surgery assessment clinic.    Assessment & Plan  Problem List Items Addressed This Visit          Digestive    Class 2 severe obesity with serious comorbidity and body mass index (BMI) of 35.0 to 35.9 in adult (H) - Primary     Stress feels more manageable now. Less travel to care for mother now that she lives with her. Working on creating good boundaries and prioritize self care.     Has found zepbound very helpful with appetite/ food noise/ hunger. Has some nausea but manageable. Will continue current plan.     Continue mounjaro 5mg weekly   Continue to be mindful of protein and hydration   Great work with mindfulness around stress management  Try podcast \"nothing much happens\"   Plastic surgery referral placed  Follow up with me in 6 months          Relevant Orders    Adult Plastic Surgery  Referral    NAM (nonalcoholic steatohepatitis)    Relevant Orders    Adult Plastic Surgery  Referral       Endocrine    Type 2 diabetes mellitus with obesity (H)    Relevant Orders    Adult Plastic Surgery  Referral       Other    S/P laparoscopic sleeve gastrectomy    Relevant Orders    Adult Plastic Surgery  Referral          30 minutes spent by me on the date of the encounter doing chart review, history and exam, documentation and further activities per the note    The longitudinal plan of care for the diagnosis(es)/condition(s) as documented " were addressed during this visit. Due to the added complexity in care, I will continue to support Lorena in the subsequent management and with ongoing continuity of care.    CHIEF COMPLAINT: Post-bariatric surgery follow-up. NBS 2020 BMI 50. S/p sleeve with Dr. Zambrano 4/2021     HISTORY OF PRESENT ILLNESS:      10/28/2024     6:35 AM   Questions Regarding Prior Weight Loss Surgery Reviewed With Patient   I had the following weight loss procedure Sleeve Gastrectomy   What year was your surgery? 2022   How has your weight changed since your last visit? I have gained weight   Do you currently have any of the following Heartburn, acid reflux, or GERD (acid reflux disease)?   Do you have any concerns today? Weight issues     Did genetic testing that showed she did not have the gene for the pancreatic cancer     Anti-obesity medications:     Current:   Mounjaro 5mg   -some nausea- has vomited a couple times (just when getting too full)       Failed/contraindicated:   Wellbutrin/ naltrexone - not helpful     Recent diet changes:   Protein shakes if not able to eat   Starting with smaller portions   Small spoons   Slowing down at meals   Stopping when satisfied     -Protein- adequate   -Water- trying hard - 32oz water and some electrolyte beverages     Recent exercise/activity changes:   Walking some   Getting outside   Got weighted walking sticks   Resistance training     Recent stressors:   Mom living with her now- full time care giver (mom has pancreatic cancer)   Works for [employer]- offered severance for leave, has been told they may bet laid off anyway   Working on self care     Recent sleep changes:   Up a couple times a night   Grinding teeth at night - got a mouth guard     Vitamins/Labs: 1/2025 wnl     GERD symptoms: stopped omeprazole and prevacid because not helping. Tums helps as needed. Small hiatal hernia.     Rashes   -abdominal pannus - worse in belly button - using OTC (baby powder with  cornstarch) things prevent and/or treat  -bilateral under arms, also very heavy and causes discomfort       Weight History:      10/28/2024     6:35 AM   --   What is your highest lifetime weight? 385   What is your lowest weight since surgery? (In pounds) 198     Initial Weight (lbs): 326 lbs  Weight: 95.3 kg (210 lb)  Last Visits Weight: 107.5 kg (237 lb) (11/2024 with hepatology, wrong weight 10/2024)  Cumulative weight loss (lbs): 116  Weight Loss Percentage: 35.58%        10/28/2024     6:35 AM   Questions Regarding Co-Morbidities and Health Concerns Reviewed With Patient   Pre-diabetes Gone away   Diabetes II Worsened   High Blood Pressure Never   High cholesterol Never   Heartburn/Reflux Worsened   Sleep apnea Never   PCOS Stayed the same   Back pain Improved   Joint pain Improved   Lower leg swelling Stayed the same           10/28/2024     6:35 AM   Eating Habits   How many meals do you eat per day? 3   Do you snack between meals? Sometimes   How much food are you eating at each meal? 1/2 cup to 1 cup   Are you able to separate your meals and liquids by at least 30 minutes? Sometimes   Are you able to avoid liquid calories? Sometimes           10/28/2024     6:35 AM   Exercise Questions Reviewed With Patient   How often do you exercise? 1 to 2 times per week   What is the duration of your exercise (in minutes)? 15 Minutes   What types of exercise do you do? walking    other   What keeps you from being more active? Lack of Time       Social History:      10/28/2024     6:35 AM   --   Are you smoking? No   Are you drinking alcohol? No       Medications:  Current Outpatient Medications   Medication Sig Dispense Refill     calcium citrate-vitamin D (CITRACAL) 315-200 MG-UNIT TABS per tablet Take 1 tablet by mouth 2 times daily       Cetaphil Moisturizing (CETAPHIL) external lotion Apply topically as needed       cholecalciferol 25 MCG (1000 UT) CHEW        guaiFENesin-codeine (ROBITUSSIN AC) 100-10 MG/5ML  solution Take 5 mLs by mouth       MOUNJARO 2.5 MG/0.5ML SOAJ Inject 0.5 mLs (2.5 mg) subcutaneously every 7 days. 2 mL 0     MOUNJARO 5 MG/0.5ML SOAJ auto-injector pen Inject 0.5 mLs (5 mg) subcutaneously every 7 days. 2 mL 0     multivitamin w/minerals (THERA-VIT-M) tablet Take 1 tablet by mouth 2 times daily       nystatin (MYCOSTATIN) 543837 UNIT/GM external powder Apply topically 2 times daily as needed (rashes in skin folds) 60 g 1     omeprazole (PRILOSEC) 20 MG DR capsule Take 1 capsule (20 mg) by mouth daily. 90 capsule 3     polyethylene glycol (MIRALAX) 17 GM/Dose powder Take 1 packet by mouth       Probiotic Product (PROBIOTIC-10 PO) Take 1 capsule by mouth every morning        Thiamine HCl (B-1 PO)        No current facility-administered medications for this visit.         10/28/2024     6:35 AM   --   Do you avoid NSAIDs such as (Ibuprofen, Aleve, Naproxen, Advil)? Yes       ROS:  GI:       10/28/2024     6:35 AM   --   Vomiting Yes   Diarrhea No   Constipation Yes   Swallowing trouble Yes   Abdominal pain No   Heartburn Yes     Skin:       10/28/2024     6:35 AM   BAR RBS ROS - SKIN   Rash in skin folds Yes     Psych:       10/28/2024     6:35 AM   --   Depression Yes   Anxiety Yes     Female Only:       10/28/2024     6:35 AM   BAR RBS ROS -    Female only None of the above   Stress urinary incontinence No       Transferred Records on 05/11/2021   Component Date Value Ref Range Status     TSH 05/11/2021 3.02  0.40 - 3.99 uIU/mL Final     Cholesterol 05/11/2021 164  114 - 200 mg/dL Final     Triglycerides 05/11/2021 133  10 - 200 mg/dL Final     HDL Cholesterol 05/11/2021 50  40 - 60 mg/dL Final     LDL Cholesterol Calculated 05/11/2021 87  <100 mg/dL Final     Potassium 05/11/2021 3.9  3.4 - 5.1 mEq/L Final     Creatinine 05/11/2021 0.78  0.40 - 1.00 mg/dL Final     GFR Estimate 05/11/2021 >60  >60 ml/min/1.73m2 Final     Glucose 05/11/2021 126 (A)  70 - 99 mg/dL Final     AST 05/11/2021 22  10 -  "40 IU/L Final     ALT 05/11/2021 19  6 - 31 IU/L Final     Hemoglobin A1C 05/11/2021 5.8 (A)  4.0 - 5.6 % Final         PHYSICAL EXAM:  Objective   Ht 1.715 m (5' 7.5\")   Wt 95.3 kg (210 lb)   BMI 32.41 kg/m             Physical Exam   GENERAL: alert and no distress  EYES: Eyes grossly normal to inspection.  No discharge or erythema, or obvious scleral/conjunctival abnormalities.  RESP: No audible wheeze, cough, or visible cyanosis.    SKIN: Visible skin clear. No significant rash, abnormal pigmentation or lesions.  NEURO: Cranial nerves grossly intact.  Mentation and speech appropriate for age.  PSYCH: Appropriate affect, tone, and pace of words        Sincerely,    Farida Seymour NP      Virtual Visit Details    Type of service:  Video Visit     Originating Location (pt. Location): Home    Distant Location (provider location):  Off-site  Platform used for Video Visit: [employer]      Again, thank you for allowing me to participate in the care of your patient.      Sincerely,    Farida Seymour NP    "

## 2025-03-24 ENCOUNTER — TELEPHONE (OUTPATIENT)
Dept: ENDOCRINOLOGY | Facility: CLINIC | Age: 60
End: 2025-03-24
Payer: COMMERCIAL

## 2025-03-24 NOTE — TELEPHONE ENCOUNTER
General Call      Reason for Call:  ref    What are your questions or concerns:  pt states her ref for plastic surg needs to be in [facility]. They will not let her schedule without it being fixed.      Could we send this information to you in [employer] or would you prefer to receive a phone call?:   Patient would prefer a phone call   Okay to leave a detailed message?: Yes at Cell number on file:    Telephone Information:   Mobile [phone]

## 2025-04-07 DIAGNOSIS — K75.81 NASH (NONALCOHOLIC STEATOHEPATITIS): ICD-10-CM

## 2025-04-07 DIAGNOSIS — E66.9 TYPE 2 DIABETES MELLITUS WITH OBESITY (H): ICD-10-CM

## 2025-04-07 DIAGNOSIS — E11.69 TYPE 2 DIABETES MELLITUS WITH OBESITY (H): ICD-10-CM

## 2025-04-08 ENCOUNTER — MYC REFILL (OUTPATIENT)
Dept: ENDOCRINOLOGY | Facility: CLINIC | Age: 60
End: 2025-04-08
Payer: COMMERCIAL

## 2025-04-08 DIAGNOSIS — E66.9 TYPE 2 DIABETES MELLITUS WITH OBESITY (H): ICD-10-CM

## 2025-04-08 DIAGNOSIS — K75.81 NASH (NONALCOHOLIC STEATOHEPATITIS): ICD-10-CM

## 2025-04-08 DIAGNOSIS — E11.69 TYPE 2 DIABETES MELLITUS WITH OBESITY (H): ICD-10-CM

## 2025-04-10 RX ORDER — TIRZEPATIDE 5 MG/.5ML
5 INJECTION, SOLUTION SUBCUTANEOUS
Qty: 2 ML | Refills: 5 | Status: SHIPPED | OUTPATIENT
Start: 2025-04-10

## 2025-04-10 NOTE — TELEPHONE ENCOUNTER
Last Written Prescription:  MOUNJARO 5 MG/0.5ML SOAJ auto-injector pen 2 mL 0 3/7/2025     ----------------------  Last Visit Date: 3/21/25-Continue mounjaro 5mg weekly   Future Visit Date: 9/25/25  ----------------------      Refill decision: Medication refilled per  Medication Refill in Ambulatory Care  policy.        Request from pharmacy:  Requested Prescriptions   Pending Prescriptions Disp Refills    MOUNJARO 5 MG/0.5ML SOAJ auto-injector pen 2 mL 0     Sig: Inject 0.5 mLs (5 mg) subcutaneously every 7 days.       GLP-1 Agonists Protocol Failed - 4/10/2025 12:18 PM        Failed - HgbA1C in past 3 or 6 months     If HgbA1C is 8 or greater, it needs to be on file within the past 3 months.  If less than 8, must be on file within the past 6 months.     Recent Labs   Lab Test 05/11/21  0000   A1C 5.8*             Failed - Has GFR on file in past 12 months and most recent value is normal        Passed - Medication is active on med list and the sig matches. RN to manually verify dose and sig if red X/fail.     If the protocol passes (green check), you do not need to verify med dose and sig.    A prescription matches if they are the same clinical intention.    For Example: once daily and every morning are the same.    The protocol can not identify upper and lower case letters as matching and will fail.     For Example: Take 1 tablet (50 mg) by mouth daily     TAKE 1 TABLET (50 MG) BY MOUTH DAILY    For all fails (red x), verify dose and sig.    If the refill does match what is on file, the RN can still proceed to approve the refill request.       If they do not match, route to the appropriate provider.             Passed - Recent (6 mo) or future (90 days) visit within the authorizing provider's specialty     The patient must have completed an in-person or virtual visit within the past 6 months or has a future visit scheduled within the next 90 days with the authorizing provider s specialty.  Urgent care and e-visits  do not quality as an office visit for this protocol.          Passed - Medication indicated for associated diagnosis     Medication is associated with one or more of the following diagnoses:     Type 2 diabetes mellitus           Passed - Patient is age 18 or older        Passed - No active pregnancy on record        Passed - No positive pregnancy test in past 12 months

## 2025-04-14 RX ORDER — TIRZEPATIDE 5 MG/.5ML
5 INJECTION, SOLUTION SUBCUTANEOUS
Qty: 2 ML | Refills: 0 | Status: SHIPPED | OUTPATIENT
Start: 2025-04-14

## 2025-06-25 DIAGNOSIS — Z98.84 S/P LAPAROSCOPIC SLEEVE GASTRECTOMY: Primary | ICD-10-CM

## 2025-06-26 ENCOUNTER — PATIENT OUTREACH (OUTPATIENT)
Dept: CARE COORDINATION | Facility: CLINIC | Age: 60
End: 2025-06-26
Payer: COMMERCIAL

## 2025-06-28 ENCOUNTER — HEALTH MAINTENANCE LETTER (OUTPATIENT)
Age: 60
End: 2025-06-28

## 2025-06-30 ENCOUNTER — PATIENT OUTREACH (OUTPATIENT)
Dept: CARE COORDINATION | Facility: CLINIC | Age: 60
End: 2025-06-30
Payer: COMMERCIAL

## (undated) DEVICE — DRAIN JACKSON PRATT ROUND SIL 19FR W/TROCAR LF JP-2232

## (undated) DEVICE — CLIP APPLIER ENDO ROTATING 12MM LG ER420

## (undated) DEVICE — SUCTION MANIFOLD NEPTUNE 2 SYS 4 PORT 0702-020-000

## (undated) DEVICE — ESU HARMONIC ACE LAP SHEARS ETHICON ACE+ 7 5MMX45CM HARH45

## (undated) DEVICE — DRSG TELFA 3X8" 1238

## (undated) DEVICE — DRSG PRIMAPORE 02X3" 7133

## (undated) DEVICE — LINEN TOWEL PACK X30 5481

## (undated) DEVICE — PREP CHLORAPREP 26ML TINTED ORANGE  260815

## (undated) DEVICE — Device

## (undated) DEVICE — ENDO TROCAR OPTICAL ACCESS KII Z-THRD 15X100MM C0R37

## (undated) DEVICE — STPL SKIN 35W ROTATING HEAD PRW35

## (undated) DEVICE — ENDO TROCAR FIRST ENTRY KII FIOS Z-THRD 12X100MM CTF73

## (undated) DEVICE — ESU GROUND PAD ADULT W/CORD E7507

## (undated) DEVICE — BLADE CLIPPER SGL USE 9680

## (undated) DEVICE — ENDO TROCAR SLEEVE KII Z-THREADED 12X100MM CTS22

## (undated) DEVICE — SUCTION IRR STRYKERFLOW II W/TIP 250-070-520

## (undated) DEVICE — ENDO SYSTEM WATER BOTTLE & TUBING W/CO2 FILTER 00711549

## (undated) DEVICE — TUBING SUCTION 10'X3/16" N510

## (undated) DEVICE — SURGICEL HEMOSTAT 4X8" 1952

## (undated) DEVICE — ESU PENCIL W/COATED BLADE E2450H

## (undated) DEVICE — JELLY LUBRICATING SURGILUBE 2OZ TUBE

## (undated) DEVICE — NDL BX TRU CUT 14GA 4.5" 2N2702X

## (undated) DEVICE — ENDO POUCH UNIVERSAL RETRIEVAL SYSTEM INZII 12/15MM CD004

## (undated) DEVICE — ENDO CAP AND TUBING STERILE FOR ENDOGATOR  100130

## (undated) DEVICE — ENDO TROCAR SLEEVE KII Z-THREADED 05X100MM CTS02

## (undated) DEVICE — KIT CONNECTOR FOR OLYMPUS ENDOSCOPES DEFENDO 100310

## (undated) DEVICE — NDL INSUFFLATION 13GA 150MM C2202

## (undated) DEVICE — ENDO SCOPE WARMER SEAL  C3101

## (undated) DEVICE — ENDO TROCAR FIRST ENTRY KII FIOS Z-THRD 05X100MM CTF03

## (undated) DEVICE — ESU HARMONIC LAPAROSCOPIC SHEARS HD 1000I 36CM HARHD36

## (undated) DEVICE — SOL NACL 0.9% INJ 1000ML BAG 2B1324X

## (undated) DEVICE — SOL NACL 0.9% IRRIG 1000ML BOTTLE 2F7124

## (undated) DEVICE — STPL POWERED ECHELON LONG 60MM PLEE60A

## (undated) DEVICE — SOL WATER IRRIG 1000ML BOTTLE 2F7114

## (undated) DEVICE — STPL RELOAD REG/THK TISSUE ECHELON 60 X 3.8MM GOLD GST60D

## (undated) DEVICE — ANTIFOG SOLUTION W/FOAM PAD 31142527

## (undated) DEVICE — SYR 10ML LL W/O NDL 302995

## (undated) DEVICE — CATH TRAY FOLEY SURESTEP 16FR W/URNE MTR STLK LATEX A303316A

## (undated) DEVICE — GLOVE PROTEXIS BLUE W/NEU-THERA 7.5  2D73EB75

## (undated) DEVICE — KIT ENDO FIRST STEP DISINFECTANT 200ML W/POUCH EP-4

## (undated) DEVICE — LINEN TOWEL PACK X6 WHITE 5487

## (undated) DEVICE — SU VICRYL 4-0 PS-2 18" UND J496H

## (undated) RX ORDER — FENTANYL CITRATE 50 UG/ML
INJECTION, SOLUTION INTRAMUSCULAR; INTRAVENOUS
Status: DISPENSED
Start: 2021-04-05

## (undated) RX ORDER — PROPOFOL 10 MG/ML
INJECTION, EMULSION INTRAVENOUS
Status: DISPENSED
Start: 2021-04-05

## (undated) RX ORDER — LIDOCAINE HYDROCHLORIDE 20 MG/ML
INJECTION, SOLUTION EPIDURAL; INFILTRATION; INTRACAUDAL; PERINEURAL
Status: DISPENSED
Start: 2021-04-05

## (undated) RX ORDER — ONDANSETRON 2 MG/ML
INJECTION INTRAMUSCULAR; INTRAVENOUS
Status: DISPENSED
Start: 2021-04-05

## (undated) RX ORDER — HYDROMORPHONE HYDROCHLORIDE 1 MG/ML
INJECTION, SOLUTION INTRAMUSCULAR; INTRAVENOUS; SUBCUTANEOUS
Status: DISPENSED
Start: 2021-04-05

## (undated) RX ORDER — BUPIVACAINE HYDROCHLORIDE 2.5 MG/ML
INJECTION, SOLUTION EPIDURAL; INFILTRATION; INTRACAUDAL
Status: DISPENSED
Start: 2021-04-05

## (undated) RX ORDER — DEXAMETHASONE SODIUM PHOSPHATE 4 MG/ML
INJECTION, SOLUTION INTRA-ARTICULAR; INTRALESIONAL; INTRAMUSCULAR; INTRAVENOUS; SOFT TISSUE
Status: DISPENSED
Start: 2021-04-05

## (undated) RX ORDER — HYDRALAZINE HYDROCHLORIDE 20 MG/ML
INJECTION INTRAMUSCULAR; INTRAVENOUS
Status: DISPENSED
Start: 2021-04-05

## (undated) RX ORDER — LIDOCAINE HYDROCHLORIDE AND EPINEPHRINE 10; 10 MG/ML; UG/ML
INJECTION, SOLUTION INFILTRATION; PERINEURAL
Status: DISPENSED
Start: 2021-04-05

## (undated) RX ORDER — CLINDAMYCIN PHOSPHATE 900 MG/50ML
INJECTION, SOLUTION INTRAVENOUS
Status: DISPENSED
Start: 2021-04-05

## (undated) RX ORDER — SODIUM CHLORIDE, SODIUM LACTATE, POTASSIUM CHLORIDE, CALCIUM CHLORIDE 600; 310; 30; 20 MG/100ML; MG/100ML; MG/100ML; MG/100ML
INJECTION, SOLUTION INTRAVENOUS
Status: DISPENSED
Start: 2021-04-05